# Patient Record
Sex: FEMALE | Race: WHITE | NOT HISPANIC OR LATINO | Employment: OTHER | URBAN - METROPOLITAN AREA
[De-identification: names, ages, dates, MRNs, and addresses within clinical notes are randomized per-mention and may not be internally consistent; named-entity substitution may affect disease eponyms.]

---

## 2017-01-04 ENCOUNTER — OFFICE VISIT (OUTPATIENT)
Dept: CARDIOLOGY | Facility: CLINIC | Age: 82
End: 2017-01-04

## 2017-01-04 VITALS
DIASTOLIC BLOOD PRESSURE: 80 MMHG | BODY MASS INDEX: 18.77 KG/M2 | HEART RATE: 79 BPM | SYSTOLIC BLOOD PRESSURE: 136 MMHG | HEIGHT: 62 IN | WEIGHT: 102 LBS

## 2017-01-04 DIAGNOSIS — I50.22 CHRONIC SYSTOLIC CONGESTIVE HEART FAILURE (HCC): ICD-10-CM

## 2017-01-04 DIAGNOSIS — I44.2 CHB (COMPLETE HEART BLOCK) (HCC): Primary | ICD-10-CM

## 2017-01-04 PROCEDURE — 99213 OFFICE O/P EST LOW 20 MIN: CPT | Performed by: INTERNAL MEDICINE

## 2017-01-04 PROCEDURE — 93000 ELECTROCARDIOGRAM COMPLETE: CPT | Performed by: INTERNAL MEDICINE

## 2017-01-04 NOTE — PROGRESS NOTES
Subjective:     Encounter Date:01/04/2017      Patient ID: Savannah Andres is a 81 y.o. female.    Chief Complaint: complete heart block, chronic systolic CHF    History of Present Illness     Dear Dr. Del Angel,     I had the pleasure of seeing your patient Savannah Andres in cardiac followup today.  As you well know, she is a cece 81-year-old woman who presented to the hospital with complete heart block.  She ended up getting an implantation of a permanent pacemaker.  She was readmitted for heart failure symptoms and responded well to diuresis.  She was in the hospital only one day.      Since I saw her last, she has been holding herself back a little bit.  She would like to get back to normal activities.  She denies any complaints of angina or dyspnea.          Review of Systems   All other systems reviewed and are negative.        ECG 12 Lead  Date/Time: 1/4/2017 10:58 AM  Performed by: ALHAJI VALDEZ  Authorized by: ALHAJI VALDEZ   Comparison: compared with previous ECG   Similar to previous ECG  Rhythm comments: AV sequential pacer  BPM: 79                 Objective:     Physical Exam   Constitutional: She is oriented to person, place, and time. She appears well-developed and well-nourished.   HENT:   Head: Normocephalic and atraumatic.   Neck: Normal range of motion. Neck supple.   Cardiovascular: Normal rate, regular rhythm and normal heart sounds.    Pulmonary/Chest: Effort normal and breath sounds normal.   Abdominal: Soft. Bowel sounds are normal.   Musculoskeletal: Normal range of motion.   Neurological: She is alert and oriented to person, place, and time.   Skin: Skin is warm and dry.   Psychiatric: She has a normal mood and affect. Her behavior is normal. Thought content normal.   Vitals reviewed.      Lab Review:       Assessment:          Diagnosis Plan   1. CHB (complete heart block)     2. Chronic systolic congestive heart failure            Plan:        It was a pleasure to see your patient in cardiac  followup today.  She is doing well from the cardiac standpoint without any complaints of angina or heart failure.  I have encouraged her to get back to her normal activities.  She will see me again in three months at which time I will recheck an echocardiogram to see if her LV function has improved.

## 2017-01-04 NOTE — MR AVS SNAPSHOT
Savannah Joseadrienne   2017 10:45 AM   Office Visit    Dept Phone:  [phone]   Encounter #:  [id number]    Provider:  Josef Angel MD   Department:  [facility] CARDIOLOGY                Your Full Care Plan              Your Updated Medication List          This list is accurate as of: 17 11:06 AM.  Always use your most recent med list.                furosemide 20 MG tablet   Commonly known as:  LASIX   Take 1 tablet by mouth Daily.       lisinopril 5 MG tablet   Commonly known as:  PRINIVIL,ZESTRIL   Take 1 tablet by mouth Daily.       metoprolol succinate XL 25 MG 24 hr tablet   Commonly known as:  TOPROL-XL   Take 1 tablet by mouth Daily.       potassium chloride 10 MEQ CR tablet   Commonly known as:  K-DUR,KLOR-CON   Take 1 tablet by mouth Daily.               We Performed the Following     ECG 12 Lead       You Were Diagnosed With        Codes Comments    CHB (complete heart block)    -  Primary ICD-10-CM: I44.2  ICD-9-CM: 426.0     Chronic systolic congestive heart failure     ICD-10-CM: I50.22  ICD-9-CM: 428.22, 428.0       Instructions     None    Patient Instructions History      Upcoming Appointments     Visit Type Date Time Department    HOSPITAL FOLLOW UP 2017 10:45 AM [facility] [place]    PACEMAKER ICD - IN OFFICE 2017  1:00 PM [facility] [place]    FOLLOW UP 2017 11:15 AM [facility] [place]      Tristar Signup     [facility] [employer] allows you to send messages to your doctor, view your test results, renew your prescriptions, schedule appointments, and more. To sign up, go to Briefcase and click on the Sign Up Now link in the New User? box. Enter your Tr[employer] Activation Code exactly as it appears below along with the last four digits of your Social Security Number and your Date of Birth () to complete the sign-up process. If you do not sign up before the expiration date, you must request a new code.    Tristar  "Activation Code: [email or web address]  Expires: 1/18/2017 11:06 AM    If you have questions, you can email [email or web address] or call [phone] to talk to our MyChart staff. Remember, [employer] Intelligence[employer] is NOT to be used for urgent needs. For medical emergencies, dial 911.               Other Info from Your Visit           Your Appointments     Feb 27, 2017  1:00 PM EST   PACEMAKER IN OFFICE with CAT DOTY   [facility] CARDIOLOGY (--)    [address] [address]   [phone]            Apr 05, 2017 11:15 AM EDT   Follow Up with Josef Angel MD   [facility] CARDIOLOGY (--)    [address] [address]   [phone]           Arrive 15 minutes prior to appointment.              Allergies     No Known Allergies      Reason for Visit     Congestive Heart Failure 1 MONTH BHE F/U      Vital Signs     Blood Pressure Pulse Height Weight Body Mass Index Smoking Status    136/80 79 62\" (157.5 cm) 102 lb (46.3 kg) 18.66 kg/m2 Never Smoker      Problems and Diagnoses Noted     CHB (complete heart block)    -  Primary    Heart failure          Results         "

## 2017-01-12 ENCOUNTER — TELEPHONE (OUTPATIENT)
Dept: CARDIOLOGY | Facility: CLINIC | Age: 82
End: 2017-01-12

## 2017-01-12 NOTE — TELEPHONE ENCOUNTER
Patient called today c/o UTI symptoms. I tried calling patient back but got her voicemail, so I left her a message instructing her to refer to her PCP and/or Urologist if she has one.

## 2017-01-16 ENCOUNTER — OFFICE VISIT (OUTPATIENT)
Dept: RETAIL CLINIC | Facility: CLINIC | Age: 82
End: 2017-01-16

## 2017-01-16 VITALS — OXYGEN SATURATION: 98 % | TEMPERATURE: 98.3 F | HEART RATE: 84 BPM

## 2017-01-16 DIAGNOSIS — R30.0 DYSURIA: Primary | ICD-10-CM

## 2017-01-16 DIAGNOSIS — N30.01 ACUTE CYSTITIS WITH HEMATURIA: ICD-10-CM

## 2017-01-16 LAB
BILIRUB BLD-MCNC: NEGATIVE MG/DL
CLARITY, POC: ABNORMAL
COLOR UR: YELLOW
GLUCOSE UR STRIP-MCNC: NEGATIVE MG/DL
KETONES UR QL: NEGATIVE
LEUKOCYTE EST, POC: ABNORMAL
NITRITE UR-MCNC: POSITIVE MG/ML
PH UR: 5 [PH] (ref 5–8)
PROT UR STRIP-MCNC: ABNORMAL MG/DL
RBC # UR STRIP: ABNORMAL /UL
SP GR UR: 1.02 (ref 1–1.03)
UROBILINOGEN UR QL: NORMAL

## 2017-01-16 PROCEDURE — 99213 OFFICE O/P EST LOW 20 MIN: CPT | Performed by: NURSE PRACTITIONER

## 2017-01-16 PROCEDURE — 81002 URINALYSIS NONAUTO W/O SCOPE: CPT | Performed by: NURSE PRACTITIONER

## 2017-01-16 RX ORDER — CIPROFLOXACIN 250 MG/1
250 TABLET, FILM COATED ORAL 2 TIMES DAILY
Qty: 6 TABLET | Refills: 0 | Status: SHIPPED | OUTPATIENT
Start: 2017-01-16 | End: 2017-01-19

## 2017-01-16 NOTE — PROGRESS NOTES
Subjective   Patient ID: Savannah Andres is a 81 y.o. female presents with   Chief Complaint   Patient presents with   • Difficulty Urinating     2d       HPI Comments: 80 yo wf  PMH:  CHF, HTN, hyperglycemia, Pacemaker insertion '16  Cc: dysuria and hesitancy x 48h.  She denies gross hematuria, flank pain or fever/chills. No n/v/d or abd pain      Difficulty Urinating    Associated symptoms include frequency and urgency. Pertinent negatives include no chills, flank pain, hematuria, nausea or vomiting.       No Known Allergies    The following portions of the patient's history were reviewed and updated as appropriate: allergies, current medications, past family history, past medical history, past social history, past surgical history and problem list.      Review of Systems   Constitutional: Negative for appetite change, chills, fever and unexpected weight change.   HENT: Negative.    Eyes: Negative.    Respiratory: Negative for chest tightness, shortness of breath and wheezing.    Cardiovascular: Negative for chest pain and palpitations.   Gastrointestinal: Negative for abdominal distention, abdominal pain, blood in stool, diarrhea, nausea and vomiting.   Endocrine: Negative.    Genitourinary: Positive for dysuria, frequency and urgency. Negative for difficulty urinating, enuresis, flank pain, genital sores, hematuria, menstrual problem, pelvic pain, vaginal bleeding, vaginal discharge and vaginal pain.   Musculoskeletal: Negative for arthralgias and joint swelling.   Skin: Negative for color change and rash.   Allergic/Immunologic: Negative.    Neurological: Negative for syncope, weakness and light-headedness.   Hematological: Negative for adenopathy. Does not bruise/bleed easily.   Psychiatric/Behavioral: Negative for confusion and sleep disturbance.       Objective     Vitals:    01/16/17 1120   Pulse: 84   Temp: 98.3 °F (36.8 °C)   SpO2: 98%         Physical Exam   Constitutional: She is oriented to person,  place, and time. She appears well-developed and well-nourished. No distress.   HENT:   Head: Normocephalic and atraumatic.   Eyes: Conjunctivae and EOM are normal.   Neck: Neck supple.   Cardiovascular: Normal rate, regular rhythm and normal heart sounds.    Pulmonary/Chest: Effort normal and breath sounds normal.   Abdominal: Soft. Bowel sounds are normal. She exhibits no distension and no mass. There is no tenderness. There is no rebound and no guarding.   Musculoskeletal: Normal range of motion.   Neurological: She is alert and oriented to person, place, and time. She has normal reflexes.   Skin: Skin is warm and dry. No rash noted. She is not diaphoretic.   Psychiatric: She has a normal mood and affect. Her behavior is normal. Judgment and thought content normal.   Nursing note and vitals reviewed.    Results for orders placed or performed in visit on 01/16/17   POC Urinalysis Dipstick   Result Value Ref Range    Color Yellow Yellow, Straw, Dark Yellow, Sherri    Clarity, UA Cloudy (A) Clear    Glucose, UA Negative Negative, 1000 mg/dL (3+) mg/dL    Bilirubin Negative Negative    Ketones, UA Negative Negative    Specific Gravity  1.020 1.005 - 1.030    Blood, UA Large (A) Negative    pH, Urine 5.0 5.0 - 8.0    Protein, POC 30 mg/dL (A) Negative mg/dL    Urobilinogen, UA Normal Normal    Leukocytes Large (3+) (A) Negative    Nitrite, UA Positive (A) Negative     12/2016 Creatinine .84      Savannah was seen today for difficulty urinating.    Diagnoses and all orders for this visit:    Dysuria  -     POC Urinalysis Dipstick    Acute cystitis with hematuria  -     ciprofloxacin (CIPRO) 250 MG tablet; Take 1 tablet by mouth 2 (Two) Times a Day.    Increase fluids, rest, activity as tolerated, Tylenol OTC as needed for pain  See LMD for JI in 7d. She will make appt. today    Follow-up with Primary Care Physician in 24-48 hours if these symptoms worsen or fail to improve as anticipated.

## 2017-01-19 ENCOUNTER — OFFICE VISIT (OUTPATIENT)
Dept: INTERNAL MEDICINE | Facility: CLINIC | Age: 82
End: 2017-01-19

## 2017-01-19 VITALS
HEIGHT: 62 IN | TEMPERATURE: 98 F | SYSTOLIC BLOOD PRESSURE: 148 MMHG | BODY MASS INDEX: 18.66 KG/M2 | OXYGEN SATURATION: 98 % | HEART RATE: 69 BPM | DIASTOLIC BLOOD PRESSURE: 86 MMHG | WEIGHT: 101.4 LBS

## 2017-01-19 DIAGNOSIS — N30.01 ACUTE CYSTITIS WITH HEMATURIA: Primary | ICD-10-CM

## 2017-01-19 LAB
BILIRUB BLD-MCNC: NEGATIVE MG/DL
CLARITY, POC: CLEAR
COLOR UR: YELLOW
GLUCOSE UR STRIP-MCNC: NEGATIVE MG/DL
KETONES UR QL: NEGATIVE
LEUKOCYTE EST, POC: NEGATIVE
NITRITE UR-MCNC: NEGATIVE MG/ML
PH UR: 6 [PH] (ref 5–8)
PROT UR STRIP-MCNC: NEGATIVE MG/DL
RBC # UR STRIP: NEGATIVE /UL
SP GR UR: 1.01 (ref 1–1.03)
UROBILINOGEN UR QL: NORMAL

## 2017-01-19 PROCEDURE — 81003 URINALYSIS AUTO W/O SCOPE: CPT | Performed by: FAMILY MEDICINE

## 2017-01-19 PROCEDURE — 99213 OFFICE O/P EST LOW 20 MIN: CPT | Performed by: FAMILY MEDICINE

## 2017-01-19 NOTE — MR AVS SNAPSHOT
Savannah Joseadrienne   2017 11:00 AM   Office Visit    Dept Phone:  [phone]   Encounter #:  [id number]    Provider:  Vasu Del Angel MD   Department:  [facility] MED                Your Full Care Plan              Today's Medication Changes          These changes are accurate as of: 17 11:09 AM.  If you have any questions, ask your nurse or doctor.               Stop taking medication(s)listed here:     ciprofloxacin 250 MG tablet   Commonly known as:  CIPRO   Stopped by:  Vasu Del Angel MD                      Your Updated Medication List          This list is accurate as of: 17 11:09 AM.  Always use your most recent med list.                furosemide 20 MG tablet   Commonly known as:  LASIX   Take 1 tablet by mouth Daily.       lisinopril 5 MG tablet   Commonly known as:  PRINIVIL,ZESTRIL   Take 1 tablet by mouth Daily.       metoprolol succinate XL 25 MG 24 hr tablet   Commonly known as:  TOPROL-XL   Take 1 tablet by mouth Daily.       potassium chloride 10 MEQ CR tablet   Commonly known as:  K-DUR,KLOR-CON   Take 1 tablet by mouth Daily.               Instructions     None    Patient Instructions History      Upcoming Appointments     Visit Type Date Time Department    HOSPITAL FOLLOW UP 2017 11:00 AM [facility] [place]    PACEMAKER ICD - IN OFFICE 2017  1:00 PM [facility] [place]    FOLLOW UP 2017 11:15 AM [facility] [place]      DataRose Signup     [facility] Data[employer] allows you to send messages to your doctor, view your test results, renew your prescriptions, schedule appointments, and more. To sign up, go to Bswift and click on the Sign Up Now link in the New User? box. Enter your Data[employer] Activation Code exactly as it appears below along with the last four digits of your Social Security Number and your Date of Birth () to complete the sign-up process. If you do not sign up before the expiration  "date, you must request a new code.    [email or web address] Activation Code: [email or web address]  Expires: 2/2/2017 11:09 AM    If you have questions, you can email [email or web address] or call [phone] to talk to our [employer] staff. Remember, MyChart is NOT to be used for urgent needs. For medical emergencies, dial 911.               Other Info from Your Visit           Your Appointments     Feb 27, 2017  1:00 PM EST   PACEMAKER IN OFFICE with CAT DOTY   [facility] CARDIOLOGY (--)    [address] [facility] [address]  [facility] [id number] [phone]            Apr 05, 2017 11:15 AM EDT   Follow Up with Josef Angel MD   [facility] CARDIOLOGY (--)    [address]  [facility] [address] [phone]           Arrive 15 minutes prior to appointment.              Allergies     No Known Allergies      Reason for Visit     following up on UTI went to urgent care building in [facility] Monday 01/16/17      Vital Signs     Blood Pressure Pulse Temperature Height Weight Oxygen Saturation    148/86 (BP Location: Left arm, Patient Position: Sitting, Cuff Size: Adult) 69 98 °F (36.7 °C) (Oral) 62\" (157.5 cm) 101 lb 6.4 oz (46 kg) 98%    Breastfeeding? Body Mass Index Smoking Status             No 18.55 kg/m2 Never Smoker           "

## 2017-01-19 NOTE — PROGRESS NOTES
Subjective   Savannah Andres is a 81 y.o. female.     Chief Complaint   Patient presents with   • following up on UTI     went to urgent care [facility] in [facility] Monday 01/16/17         History of Present Illness patient went to urgent care 3 days ago was given ciprofloxacin 250 mg twice a day for urinary tract infection today her symptomsare none she's feeling much better she doesn't know why she developed these urinary tract infection she hasn't had them in the past significant changes her lifestyle as last year she's been living in a senior homeand will develop intermittent bouts of diarrhea    The following portions of the patient's history were reviewed and updated as appropriate: allergies, current medications, past medical history, past surgical history and problem list.    Review of Systems   Constitutional: Negative for appetite change, chills, fever and unexpected weight change.   HENT: Negative.    Eyes: Negative.    Respiratory: Negative for chest tightness, shortness of breath and wheezing.    Cardiovascular: Negative for chest pain and palpitations.   Gastrointestinal: Negative for abdominal distention, abdominal pain and vomiting.   Endocrine: Negative.    Genitourinary: Negative for genital sores, hematuria, menstrual problem, pelvic pain, vaginal bleeding, vaginal discharge and vaginal pain.   Musculoskeletal: Negative for joint swelling.   Skin: Negative for color change and rash.   Allergic/Immunologic: Negative.    Neurological: Negative for seizures and syncope.   Hematological: Negative for adenopathy. Does not bruise/bleed easily.   Psychiatric/Behavioral: Negative for confusion and decreased concentration.       Objective   Physical Exam   Constitutional: She is oriented to person, place, and time. She appears well-developed and well-nourished.   Pulmonary/Chest: Effort normal and breath sounds normal.   Abdominal: Soft. Bowel sounds are normal. There is no tenderness.   Neurological: She is  alert and oriented to person, place, and time.   Psychiatric: She has a normal mood and affect. Her behavior is normal. Judgment and thought content normal.   Vitals reviewed.      Assessment/Plan   Savannah was seen today for following up on uti.    Diagnoses and all orders for this visit:    Acute cystitis with hematuria      UA reveals resolved UTI  Follow-up as needed recommend wipes hygiene for episodes of diarrhea

## 2017-02-13 ENCOUNTER — TELEPHONE (OUTPATIENT)
Dept: CARDIOLOGY | Facility: CLINIC | Age: 82
End: 2017-02-13

## 2017-02-13 NOTE — TELEPHONE ENCOUNTER
02/13/17  2:33 PM  Savannha Andres  1935    Home Phone [phone]     Ms. Andres calls to report watery diarrhea for the last 1-2 weeks. She states that she lives in a home for seniors and there has been a bug going around. She has an appt with PCP tomorrow for this issue. However, she did some research and sees that metoprolol and KCl can also cause diarrhea. She wanted to know if these medicines could be making it worse. She has been taking these medications since 11/29/16. She reports no watery diarrhea until now.     I advised to keep appt with PCP for treatment of possible GI bug. I advised that if the PCP felt the metoprolol or KCl needed to be stopped, they could contact our office.     Mary Beth VELASQUEZ RN

## 2017-02-14 ENCOUNTER — OFFICE VISIT (OUTPATIENT)
Dept: INTERNAL MEDICINE | Facility: CLINIC | Age: 82
End: 2017-02-14

## 2017-02-14 VITALS
OXYGEN SATURATION: 100 % | SYSTOLIC BLOOD PRESSURE: 160 MMHG | BODY MASS INDEX: 18.2 KG/M2 | HEIGHT: 62 IN | TEMPERATURE: 97.8 F | HEART RATE: 78 BPM | WEIGHT: 98.9 LBS | DIASTOLIC BLOOD PRESSURE: 84 MMHG

## 2017-02-14 DIAGNOSIS — A08.4 VIRAL ENTERITIS: ICD-10-CM

## 2017-02-14 DIAGNOSIS — E87.6 HYPOKALEMIA: ICD-10-CM

## 2017-02-14 PROBLEM — R30.0 DYSURIA: Status: RESOLVED | Noted: 2017-01-16 | Resolved: 2017-02-14

## 2017-02-14 LAB
BUN SERPL-MCNC: 13 MG/DL (ref 8–23)
BUN/CREAT SERPL: 14.4 (ref 7–25)
CALCIUM SERPL-MCNC: 10 MG/DL (ref 8.6–10.5)
CHLORIDE SERPL-SCNC: 104 MMOL/L (ref 98–107)
CO2 SERPL-SCNC: 27.2 MMOL/L (ref 22–29)
CREAT SERPL-MCNC: 0.9 MG/DL (ref 0.57–1)
GLUCOSE SERPL-MCNC: 114 MG/DL (ref 65–99)
POTASSIUM SERPL-SCNC: 5.3 MMOL/L (ref 3.5–5.2)
SODIUM SERPL-SCNC: 144 MMOL/L (ref 136–145)

## 2017-02-14 PROCEDURE — 99213 OFFICE O/P EST LOW 20 MIN: CPT | Performed by: FAMILY MEDICINE

## 2017-02-14 NOTE — PROGRESS NOTES
Subjective   Savannah Andres is a 81 y.o. female.     Chief Complaint   Patient presents with   • patient has had diarrhea since last Tuesday     patient lives at the [facility] and other residents have this also   • patient is concerned that thismay be due to medications (diu         History of Present Illness patient has had one week history of multiple stiffness in the morning then no more stool throughout the day as found black or bloody any specific abdominal pain she is not having any vomiting there is no fever take any medication but seems to gradually be improving  Medications long-standing with no specific changes  The following portions of the patient's history were reviewed and updated as appropriate: allergies, current medications, past family history, past medical history, past social history, past surgical history and problem list.    Review of Systems   Constitutional: Negative for appetite change, chills, fever and unexpected weight change.   HENT: Negative.    Eyes: Negative.    Respiratory: Negative for chest tightness, shortness of breath and wheezing.    Cardiovascular: Negative for chest pain and palpitations.   Gastrointestinal: Positive for diarrhea. Negative for abdominal distention, abdominal pain, blood in stool, nausea, rectal pain and vomiting.   Endocrine: Negative.    Genitourinary: Negative for genital sores, hematuria, menstrual problem, pelvic pain, vaginal bleeding, vaginal discharge and vaginal pain.   Musculoskeletal: Negative for joint swelling.   Skin: Negative for color change and rash.   Allergic/Immunologic: Negative.    Hematological: Negative for adenopathy. Does not bruise/bleed easily.   Psychiatric/Behavioral: Negative for confusion and decreased concentration.       Objective   Physical Exam   Constitutional: She is oriented to person, place, and time. She appears well-developed and well-nourished.   HENT:   Head: Normocephalic and atraumatic.   Eyes: EOM are normal. Pupils  are equal, round, and reactive to light.   Neck: Normal range of motion. Neck supple.   Cardiovascular: Normal rate and regular rhythm.    Pulmonary/Chest: Effort normal and breath sounds normal.   Abdominal: Soft. Bowel sounds are normal. There is no tenderness.   Neurological: She is alert and oriented to person, place, and time.   Psychiatric: She has a normal mood and affect. Her behavior is normal. Judgment and thought content normal.   Vitals reviewed.      Assessment/Plan   Savannah was seen today for patient has had diarrhea since last tuesday and patient is concerned that thismay be due to medications (diu.    Diagnoses and all orders for this visit:    Hypokalemia  -     Basic Metabolic Panel    Viral enteritis  -     Basic Metabolic Panel      Follow-up results of blood work symptomatic treatment with fluids

## 2017-02-15 ENCOUNTER — TELEPHONE (OUTPATIENT)
Dept: INTERNAL MEDICINE | Facility: CLINIC | Age: 82
End: 2017-02-15

## 2017-02-15 NOTE — TELEPHONE ENCOUNTER
----- Message from Vasu Del Angel MD sent at 2/15/2017  1:13 PM EST -----  Recommend stop potassium take the furosemide every other day lfollow up in one month

## 2017-02-15 NOTE — TELEPHONE ENCOUNTER
Unable to reach patient - telephone number disconnected.  LMA for daughter Savannah to call.  Spoke to son, Collin, and he was notified.

## 2017-02-27 ENCOUNTER — CLINICAL SUPPORT NO REQUIREMENTS (OUTPATIENT)
Dept: CARDIOLOGY | Facility: CLINIC | Age: 82
End: 2017-02-27

## 2017-02-27 DIAGNOSIS — I44.2 ATRIOVENTRICULAR BLOCK, COMPLETE (HCC): Primary | ICD-10-CM

## 2017-02-27 PROCEDURE — 93280 PM DEVICE PROGR EVAL DUAL: CPT | Performed by: INTERNAL MEDICINE

## 2017-03-03 ENCOUNTER — TELEPHONE (OUTPATIENT)
Dept: INTERNAL MEDICINE | Facility: CLINIC | Age: 82
End: 2017-03-03

## 2017-03-03 NOTE — TELEPHONE ENCOUNTER
Patient called stating that she takes the furosemide every other day.  She has been getting up during the night more to urinate and is extremely thirsty all the time.  She thinks that this may be from the diuretic.  (Her blood sugar has also been elevated) Please advise.

## 2017-03-09 ENCOUNTER — OFFICE VISIT (OUTPATIENT)
Dept: INTERNAL MEDICINE | Facility: CLINIC | Age: 82
End: 2017-03-09

## 2017-03-09 VITALS
HEIGHT: 62 IN | HEART RATE: 80 BPM | OXYGEN SATURATION: 98 % | DIASTOLIC BLOOD PRESSURE: 74 MMHG | TEMPERATURE: 97.5 F | SYSTOLIC BLOOD PRESSURE: 156 MMHG | BODY MASS INDEX: 18.05 KG/M2 | WEIGHT: 98.1 LBS

## 2017-03-09 DIAGNOSIS — I50.22 CHF (CONGESTIVE HEART FAILURE), NYHA CLASS III, CHRONIC, SYSTOLIC (HCC): Primary | ICD-10-CM

## 2017-03-09 PROCEDURE — 99213 OFFICE O/P EST LOW 20 MIN: CPT | Performed by: FAMILY MEDICINE

## 2017-03-09 RX ORDER — METOPROLOL SUCCINATE 25 MG/1
50 TABLET, EXTENDED RELEASE ORAL
Qty: 30 TABLET | Refills: 6 | Status: SHIPPED | OUTPATIENT
Start: 2017-03-09 | End: 2017-03-09 | Stop reason: SDUPTHER

## 2017-03-09 RX ORDER — METOPROLOL SUCCINATE 25 MG/1
50 TABLET, EXTENDED RELEASE ORAL
Qty: 60 TABLET | Refills: 6 | Status: SHIPPED | OUTPATIENT
Start: 2017-03-09 | End: 2017-03-09 | Stop reason: SDUPTHER

## 2017-03-09 RX ORDER — FUROSEMIDE 20 MG/1
TABLET ORAL
Qty: 30 TABLET | Refills: 6 | Status: SHIPPED | OUTPATIENT
Start: 2017-03-09 | End: 2017-03-23 | Stop reason: SDUPTHER

## 2017-03-09 RX ORDER — METOPROLOL SUCCINATE 50 MG/1
50 TABLET, EXTENDED RELEASE ORAL
Qty: 30 TABLET | Refills: 6 | Status: SHIPPED | OUTPATIENT
Start: 2017-03-09 | End: 2017-03-23

## 2017-03-09 NOTE — PROGRESS NOTES
Subjective   Savannah Andres is a 81 y.o. female.     Chief Complaint   Patient presents with   • patient has been urinating all the time   • patient has been thirsty all the time         History of Present Illness   Patient's blood pressure been fairly stable as well as her weight she is taking the Lasix every other day and this has helped her symptoms a little bit she has no elevated blood pressure readings and no weight gain  The following portions of the patient's history were reviewed and updated as appropriate: allergies, current medications, past family history, past medical history, past social history, past surgical history and problem list.    Review of Systems   Constitutional: Negative for appetite change, chills, fever and unexpected weight change.   HENT: Negative.    Eyes: Negative.    Respiratory: Negative for chest tightness, shortness of breath and wheezing.    Cardiovascular: Negative for chest pain and palpitations.   Gastrointestinal: Negative for abdominal distention, abdominal pain and vomiting.   Endocrine: Negative.    Genitourinary: Positive for difficulty urinating and frequency. Negative for dysuria, genital sores, hematuria, menstrual problem, pelvic pain, urgency, vaginal bleeding, vaginal discharge and vaginal pain.   Musculoskeletal: Negative for joint swelling.   Skin: Negative for color change and rash.   Allergic/Immunologic: Negative.    Neurological: Negative for seizures and syncope.   Hematological: Negative for adenopathy. Does not bruise/bleed easily.   Psychiatric/Behavioral: Negative for confusion and decreased concentration.       Objective   Physical Exam   Constitutional: She is oriented to person, place, and time. She appears well-developed and well-nourished.   HENT:   Head: Normocephalic and atraumatic.   Eyes: EOM are normal. Pupils are equal, round, and reactive to light.   Neck: Normal range of motion. Neck supple.   Cardiovascular: Normal rate and regular rhythm.     Pulmonary/Chest: Effort normal and breath sounds normal.   Abdominal: Soft. Bowel sounds are normal. There is no tenderness.   Musculoskeletal: She exhibits no edema.   Neurological: She is alert and oriented to person, place, and time.   Psychiatric: She has a normal mood and affect. Her behavior is normal. Judgment and thought content normal.   Vitals reviewed.      Assessment/Plan   Savannah was seen today for patient has been urinating all the time and patient has been thirsty all the time.    Diagnoses and all orders for this visit:    CHF (congestive heart failure), NYHA class III, chronic, systolic    Other orders  -     Discontinue: metoprolol succinate XL (TOPROL-XL) 25 MG 24 hr tablet; Take 2 tablets by mouth Daily.  -     furosemide (LASIX) 20 MG tablet; every third day  -     metoprolol succinate XL (TOPROL-XL) 50 MG 24 hr tablet; Take 1 tablet by mouth Daily.      We'll follow-up in one month

## 2017-03-22 ENCOUNTER — TELEPHONE (OUTPATIENT)
Dept: INTERNAL MEDICINE | Facility: CLINIC | Age: 82
End: 2017-03-22

## 2017-03-22 NOTE — TELEPHONE ENCOUNTER
Patient called stating that she was notified to take furosemide every 3 days at her last appointment.  She has been having more swelling in her legs and patient would like to know if she can increase this to 1 every other day.  She is not having any shortness of breath or any other symptoms. Patient also concerned why her metoprolol was increased to 50 mg  - she said that she thought that she was taking 25 mg 1 daily. She is to follow up with her cardiologist on 4/5/17.  Please advise.

## 2017-03-23 RX ORDER — FUROSEMIDE 20 MG/1
20 TABLET ORAL EVERY OTHER DAY
Qty: 30 TABLET | Refills: 6
Start: 2017-03-23 | End: 2017-10-12 | Stop reason: SDUPTHER

## 2017-03-23 RX ORDER — METOPROLOL SUCCINATE 50 MG/1
TABLET, EXTENDED RELEASE ORAL
Qty: 30 TABLET | Refills: 6
Start: 2017-03-23 | End: 2017-11-17 | Stop reason: SDUPTHER

## 2017-03-23 NOTE — TELEPHONE ENCOUNTER
I thought that she reported taking metoprolol 25 mg 2 daily. So I changed it to one pill daily of 50 mg, if she was taking 25 mg 1 daily then  reduce the metoprolol to 25 mg 1 daily. take the furosemide every other day

## 2017-03-23 NOTE — TELEPHONE ENCOUNTER
Patient notified - she was taking metoprolol XL 25 mg daily not 2 daily as reported at her last visit.  This was changed back to 25 mg 1 daily per Dr. Del Angel.

## 2017-04-05 ENCOUNTER — OFFICE VISIT (OUTPATIENT)
Dept: CARDIOLOGY | Facility: CLINIC | Age: 82
End: 2017-04-05

## 2017-04-05 VITALS
WEIGHT: 98 LBS | HEART RATE: 62 BPM | DIASTOLIC BLOOD PRESSURE: 90 MMHG | SYSTOLIC BLOOD PRESSURE: 142 MMHG | BODY MASS INDEX: 18.03 KG/M2 | HEIGHT: 62 IN

## 2017-04-05 DIAGNOSIS — I44.2 CHB (COMPLETE HEART BLOCK) (HCC): Primary | ICD-10-CM

## 2017-04-05 PROCEDURE — 99213 OFFICE O/P EST LOW 20 MIN: CPT | Performed by: INTERNAL MEDICINE

## 2017-04-05 PROCEDURE — 93000 ELECTROCARDIOGRAM COMPLETE: CPT | Performed by: INTERNAL MEDICINE

## 2017-04-17 NOTE — PROGRESS NOTES
Subjective:     Encounter Date:04/05/2017      Patient ID: Savannah Andres is a 81 y.o. female.    Chief Complaint: CHB    History of Present Illness     Dear Dr. Del Angel:      I had the pleasure of seeing the patient in cardiac follow-up today.  As you well know, she is a cece, 81-year-old woman who presented to the hospital with complete heart block.  She had a permanent pacemaker placed.  She had some congestive heart failure immediately afterwards that responded to diuresis.  Her left ventricular function was checked after recovery and is normal.      Since I have last seen her, she reports doing quite well.  She has no complaints of angina, dyspnea, or syncope.          Review of Systems   Cardiovascular: Negative for orthopnea.   Respiratory: Negative for shortness of breath.    All other systems reviewed and are negative.        ECG 12 Lead  Date/Time: 4/16/2017 11:48 PM  Performed by: ALHAJI VALDEZ  Authorized by: ALHAJI VALDEZ   Comparison: compared with previous ECG   Similar to previous ECG  Rhythm: sinus rhythm  Rhythm comments: A-sensed, V-paced  BPM: 62                 Objective:     Physical Exam   Constitutional: She is oriented to person, place, and time. She appears well-developed and well-nourished.   HENT:   Head: Normocephalic and atraumatic.   Neck: Normal range of motion. Neck supple. No JVD present.   Cardiovascular: Normal rate, regular rhythm and normal heart sounds.  Exam reveals no S3 and no S4.    Pulmonary/Chest: Effort normal and breath sounds normal. She has no rales.   Abdominal: Soft. Bowel sounds are normal. She exhibits no ascites. There is no hepatomegaly.   Musculoskeletal: Normal range of motion.   Neurological: She is alert and oriented to person, place, and time.   Skin: Skin is warm and dry.   Psychiatric: She has a normal mood and affect. Her behavior is normal. Thought content normal.   Vitals reviewed.      Lab Review:       Assessment:          Diagnosis Plan   1. CHB  (complete heart block)            Plan:        It was a pleasure to see the patient in cardiac follow-up today.  She is doing very well from the cardiac standpoint.  She has no more congestive heart failure symptoms.  I suspect her left ventricular function has returned to normal.  She will get an echocardiogram at her convenience to document this.  She will see me again in six months or sooner if symptoms warrant.      Heart Failure  Assessment  • NYHA class I - There is no limitation of physical activity. Physical activity does not cause fatigue, palpitations or shortness of breath.  • The patient was newly diagnosed with heart failure within the past 12 months  • ACE inhibitor prescribed  • Beta blocker prescribed  • Diuretics prescribed  • The most recent ejection fraction is 38%  • Left ventricular function is moderately reduced by qualitative assessment    Plan  • The patient has received heart failure education on the following topics: prognosis/end-of-life issues, dietary sodium restriction, medication instructions, smoking cessation, minimizing or avoiding NSAID use, symptom management, physical activity, weight monitoring, minimizing alcohol intake and referral for visiting nurse, heart failure education program, or management program  • The heart failure care plan was discussed with the patient today including: continuing the current program    Subjective/Objective    • Physical exam findings negative for rales, peripheral edema, elevated JVP, S3 gallop, S4 gallop, hepatomegaly and ascites.

## 2017-06-12 RX ORDER — FUROSEMIDE 20 MG/1
TABLET ORAL
Qty: 30 TABLET | Refills: 5 | Status: SHIPPED | OUTPATIENT
Start: 2017-06-12 | End: 2018-01-13 | Stop reason: SDUPTHER

## 2017-06-12 RX ORDER — LISINOPRIL 5 MG/1
TABLET ORAL
Qty: 30 TABLET | Refills: 5 | Status: SHIPPED | OUTPATIENT
Start: 2017-06-12 | End: 2017-12-10 | Stop reason: SDUPTHER

## 2017-06-29 ENCOUNTER — CLINICAL SUPPORT NO REQUIREMENTS (OUTPATIENT)
Dept: CARDIOLOGY | Facility: CLINIC | Age: 82
End: 2017-06-29

## 2017-06-29 DIAGNOSIS — I44.2 CHB (COMPLETE HEART BLOCK) (HCC): Primary | ICD-10-CM

## 2017-06-29 PROCEDURE — 93296 REM INTERROG EVL PM/IDS: CPT | Performed by: INTERNAL MEDICINE

## 2017-06-29 PROCEDURE — 93294 REM INTERROG EVL PM/LDLS PM: CPT | Performed by: INTERNAL MEDICINE

## 2017-10-12 ENCOUNTER — CLINICAL SUPPORT NO REQUIREMENTS (OUTPATIENT)
Dept: CARDIOLOGY | Facility: CLINIC | Age: 82
End: 2017-10-12

## 2017-10-12 ENCOUNTER — OFFICE VISIT (OUTPATIENT)
Dept: CARDIOLOGY | Facility: CLINIC | Age: 82
End: 2017-10-12

## 2017-10-12 VITALS
DIASTOLIC BLOOD PRESSURE: 68 MMHG | SYSTOLIC BLOOD PRESSURE: 130 MMHG | BODY MASS INDEX: 18.77 KG/M2 | HEART RATE: 62 BPM | WEIGHT: 102 LBS | HEIGHT: 62 IN

## 2017-10-12 DIAGNOSIS — I44.2 CHB (COMPLETE HEART BLOCK) (HCC): Primary | ICD-10-CM

## 2017-10-12 DIAGNOSIS — I44.2 THIRD DEGREE HEART BLOCK (HCC): Primary | ICD-10-CM

## 2017-10-12 PROCEDURE — 99213 OFFICE O/P EST LOW 20 MIN: CPT | Performed by: INTERNAL MEDICINE

## 2017-10-12 PROCEDURE — 93280 PM DEVICE PROGR EVAL DUAL: CPT | Performed by: INTERNAL MEDICINE

## 2017-10-22 PROCEDURE — 93000 ELECTROCARDIOGRAM COMPLETE: CPT | Performed by: INTERNAL MEDICINE

## 2017-10-22 NOTE — PROGRESS NOTES
Subjective:     Encounter Date:10/12/2017      Patient ID: Savannah Andres is a 82 y.o. female.    Chief Complaint: complete heart block    History of Present Illness    Dear Dr. Del Angel:    I had the pleasure of seeing this patient in cardiac follow up today.  As you well know, she is a cece 82-year-old woman with history of hypertension and complete heart block.  She is status post implantation of a dual chamber pacemaker.      She comes in for her 6 month follow up.  Since I have last seen her, she reports doing well.  Her LV function is normal.  She has no complaints of angina or heart failure.    Her pacemaker was checked today and is functioning well.        Review of Systems   All other systems reviewed and are negative.        ECG 12 Lead  Date/Time: 10/22/2017 12:03 PM  Performed by: ALHAJI VALDEZ  Authorized by: ALHAJI VALDEZ   Comparison: compared with previous ECG   Similar to previous ECG  Rhythm comments: A-V sequential pacing  BPM: 62                 Objective:     Physical Exam   Constitutional: She is oriented to person, place, and time. She appears well-developed and well-nourished.   HENT:   Head: Normocephalic and atraumatic.   Neck: Normal range of motion. Neck supple.   Cardiovascular: Normal rate, regular rhythm and normal heart sounds.    Pulmonary/Chest: Effort normal and breath sounds normal.   Abdominal: Soft. Bowel sounds are normal.   Musculoskeletal: Normal range of motion.   Neurological: She is alert and oriented to person, place, and time.   Skin: Skin is warm and dry.   Psychiatric: She has a normal mood and affect. Her behavior is normal. Thought content normal.   Vitals reviewed.      Lab Review:       Assessment:          Diagnosis Plan   1. CHB (complete heart block)            Plan:       It was a pleasure to see your patient in cardiac follow up today.  She is doing very well from a cardiac standpoint without any complaints of angina or heart failure.  I have asked her to watch her  blood pressure closely.  She has no new findings.  She will see me again in 6 months or sooner if symptoms warrant.

## 2017-11-13 RX ORDER — METOPROLOL SUCCINATE 50 MG/1
TABLET, EXTENDED RELEASE ORAL
Qty: 30 TABLET | Refills: 5 | OUTPATIENT
Start: 2017-11-13

## 2017-11-14 RX ORDER — METOPROLOL SUCCINATE 50 MG/1
25 TABLET, EXTENDED RELEASE ORAL DAILY
Qty: 15 TABLET | Refills: 0 | Status: SHIPPED | OUTPATIENT
Start: 2017-11-14 | End: 2018-05-14 | Stop reason: DRUGHIGH

## 2017-11-16 RX ORDER — METOPROLOL SUCCINATE 50 MG/1
TABLET, EXTENDED RELEASE ORAL
Qty: 15 TABLET | Refills: 0 | OUTPATIENT
Start: 2017-11-16

## 2017-11-17 RX ORDER — METOPROLOL SUCCINATE 50 MG/1
TABLET, EXTENDED RELEASE ORAL
Qty: 30 TABLET | Refills: 3 | Status: SHIPPED | OUTPATIENT
Start: 2017-11-17 | End: 2018-01-22 | Stop reason: SDUPTHER

## 2017-12-12 RX ORDER — LISINOPRIL 5 MG/1
TABLET ORAL
Qty: 30 TABLET | Refills: 11 | Status: SHIPPED | OUTPATIENT
Start: 2017-12-12 | End: 2018-01-22 | Stop reason: SDUPTHER

## 2018-01-08 ENCOUNTER — TELEPHONE (OUTPATIENT)
Dept: CARDIOLOGY | Facility: CLINIC | Age: 83
End: 2018-01-08

## 2018-01-08 NOTE — TELEPHONE ENCOUNTER
Patient called to see if it's ok for her to take aspirin for back pain.    Per verbal from Dr. Angel, this is ok.    Left voicemail informing patient.

## 2018-01-09 ENCOUNTER — HOSPITAL ENCOUNTER (EMERGENCY)
Facility: HOSPITAL | Age: 83
Discharge: HOME | End: 2018-01-09
Attending: EMERGENCY MEDICINE | Admitting: EMERGENCY MEDICINE

## 2018-01-09 ENCOUNTER — APPOINTMENT (OUTPATIENT)
Dept: GENERAL RADIOLOGY | Facility: HOSPITAL | Age: 83
End: 2018-01-09

## 2018-01-09 VITALS
DIASTOLIC BLOOD PRESSURE: 97 MMHG | HEIGHT: 62 IN | BODY MASS INDEX: 18.77 KG/M2 | HEART RATE: 64 BPM | SYSTOLIC BLOOD PRESSURE: 188 MMHG | WEIGHT: 102 LBS | TEMPERATURE: 96.3 F | RESPIRATION RATE: 16 BRPM | OXYGEN SATURATION: 98 %

## 2018-01-09 DIAGNOSIS — M54.50 ACUTE RIGHT-SIDED LOW BACK PAIN WITHOUT SCIATICA: Primary | ICD-10-CM

## 2018-01-09 PROCEDURE — 99282 EMERGENCY DEPT VISIT SF MDM: CPT

## 2018-01-09 PROCEDURE — 72110 X-RAY EXAM L-2 SPINE 4/>VWS: CPT

## 2018-01-09 RX ORDER — CYCLOBENZAPRINE HCL 5 MG
5 TABLET ORAL 3 TIMES DAILY PRN
Qty: 15 TABLET | Refills: 0 | Status: SHIPPED | OUTPATIENT
Start: 2018-01-09 | End: 2018-05-14 | Stop reason: ALTCHOICE

## 2018-01-09 NOTE — ED PROVIDER NOTES
" EMERGENCY DEPARTMENT ENCOUNTER    CHIEF COMPLAINT  Chief Complaint: Back Pain  History given by: Patient  History limited by: N/A  Room Number: 48/48  PMD: Vasu Del Angel MD      HPI:  Pt presents to the ED c/o intermittent episodes of \"stiff\" low back pain (right > left) onset about a week ago. No radiation to the BLEs. No known injury sustained to the low back recently. Pt reports that her back pain worsens with movement and improves with remaining still. Pt reports that she has taken ASA without significant sx relief. Pt denies chest pain, dyspnea, abdominal pain, flank pain, bowel/bladder incontinence, motor/sensory deficits, saddle anesthesia, and urinary retention. There are no other complaints at this time.     Pain Location: Low back (right > left)  Radiation: None  Quality: \"stiffness\"  Intensity/Severity: Moderate  Duration: Onset about a week ago  Onset quality: Gradual  Timing: Intermittent  Progression: Waxing and waning  Aggravating Factors: Movement  Alleviating Factors: Remaining still   Previous Episodes: None  Treatment before arrival: ASA (no significant sx relief)  Associated Symptoms: None      PAST MEDICAL HISTORY  Active Ambulatory Problems     Diagnosis Date Noted   • Third degree heart block 11/22/2016   • CHF (congestive heart failure), NYHA class III 11/30/2016   • Hyperglycemia 12/12/2016   • Hypokalemia 12/12/2016   • Viral enteritis 02/14/2017     Resolved Ambulatory Problems     Diagnosis Date Noted   • Dysuria 01/16/2017   • Acute cystitis with hematuria 01/16/2017     Past Medical History:   Diagnosis Date   • Bilateral leg edema    • CHB (complete heart block)    • CHF (congestive heart failure)    • Loss of consciousness    • SOB (shortness of breath)    • Symptomatic bradycardia    • Syncope          PAST SURGICAL HISTORY  Past Surgical History:   Procedure Laterality Date   • CARDIAC ELECTROPHYSIOLOGY PROCEDURE N/A 11/23/2016    Procedure: Pacemaker DC new  [employer];  " Surgeon: Ananth Malcolm MD;  Location: [facility] INVASIVE LOCATION;  Service:    • PACEMAKER IMPLANTATION           FAMILY HISTORY  Family History   Problem Relation Age of Onset   • Hypertension Mother    • Hypertension Father          SOCIAL HISTORY  Social History     Social History   • Marital status: Single     Spouse name: N/A   • Number of children: 3   • Years of education: N/A     Occupational History   • unknown      Social History Main Topics   • Smoking status: Never Smoker   • Smokeless tobacco: Never Used   • Alcohol use No   • Drug use: No   • Sexual activity: Defer     Other Topics Concern   • Not on file     Social History Narrative         ALLERGIES  Review of patient's allergies indicates no known allergies.        REVIEW OF SYSTEMS  Review of Systems   Constitutional: Negative.    Eyes: Negative for visual disturbance.   Respiratory: Negative for shortness of breath.    Cardiovascular: Negative for chest pain.   Gastrointestinal: Negative for abdominal pain, nausea and vomiting.   Genitourinary: Negative for difficulty urinating and flank pain.   Musculoskeletal: Positive for back pain. Negative for neck pain.   Skin: Negative.    Neurological: Negative for syncope, weakness, numbness and headaches.   Psychiatric/Behavioral: Negative.    All other systems reviewed and are negative.           PHYSICAL EXAM  ED Triage Vitals   Temp Heart Rate Resp BP SpO2   01/09/18 1147 01/09/18 0936 01/09/18 0936 01/09/18 1145 01/09/18 0936   96.3 °F (35.7 °C) 87 16 188/97 98 %      Temp src Heart Rate Source Patient Position BP Location FiO2 (%)   01/09/18 1147 01/09/18 0936 -- -- --   Oral Monitor          Physical Exam   Constitutional: She is oriented to person, place, and time. No distress.   Eyes: EOM are normal.   Neck: Normal range of motion.   Cardiovascular: Normal rate and regular rhythm.    Pulmonary/Chest: Effort normal and breath sounds normal. No respiratory distress.   Musculoskeletal: She  exhibits tenderness (to the right low back - mild).   Pt ambulates in the ER without difficulty.    Neurological: She is alert and oriented to person, place, and time. She has normal sensation, normal strength and normal reflexes.   Skin: Skin is warm and dry.   Psychiatric: Affect normal.   Nursing note and vitals reviewed.            RADIOLOGY  XR Spine Lumbar 4+ View     There are no prior exams. There is prominent diffuse  osteoporosis with some minimal compression deformity involving the  superior endplate of L3. There is also some minimal compression  deformity of the T12 vertebral body. There is moderate spurring of the  posterior facets throughout the lumbar spine.    Interpreted by radiologist. Independently viewed by me.                Ordered the above noted radiological studies. Reviewed by me in PACS.       PROCEDURES  Procedures              PROGRESS AND CONSULTS  ED Course   Comment By Time   2:18 PM  Patient with musculoskeletal back pain.  May have very mild compression fracture of lumbar spine however no trauma and unclear age.  Patient ambulates without difficulty.  Will discharge home.  Follow up with PMD.  Tylenol.  Flexeril. Mehul Cameron MD 01/09 2150     2:16 PM:  Informed pt that her L-Spine X-Ray shows diffuse chronic changes with minimal compression deformities present. Pt was advised to apply heat to the area of pain and to f/u with PMD. Pt will be prescribed with rx for muscle relaxer. RTER warnings given. Pt agrees with plan for discharge.             MEDICAL DECISION MAKING      MDM  Number of Diagnoses or Management Options     Amount and/or Complexity of Data Reviewed  Tests in the radiology section of CPT®: ordered and reviewed (L-Spine X-Ray:   There are no prior exams. There is prominent diffuse  osteoporosis with some minimal compression deformity involving the superior endplate of L3. There is also some minimal compression deformity of the T12 vertebral body. There is  moderate spurring of the posterior facets throughout the lumbar spine.)    Patient Progress  Patient progress: stable             DIAGNOSIS  Final diagnoses:   Acute right-sided low back pain without sciatica         DISPOSITION  Pt discharged.    DISCHARGE    Patient discharged in stable condition.    Reviewed implications of results, diagnosis, meds, responsibility to follow up, warning signs and symptoms of possible worsening, potential complications and reasons to return to ER.    Patient/Family voiced understanding of above instructions.    Discussed plan for discharge, as there is no emergent indication for admission.  Pt/family is agreeable and understands need for follow up and repeat testing.  Pt is aware that discharge does not mean that nothing is wrong but it indicates no emergency is present that requires admission and they must continue care with follow-up as given below or physician of their choice.     FOLLOW-UP  Vasu Del Angel MD  [id number] [id number] [phone]    Schedule an appointment as soon as possible for a visit in 2 days           Medication List      New Prescriptions          cyclobenzaprine 5 MG tablet   Commonly known as:  FLEXERIL   Take 1 tablet by mouth 3 (Three) Times a Day As Needed for Muscle Spasms.                       Latest Documented Vital Signs:  As of 2:21 PM  BP- 146/81 HR- 64 Temp- 96.3 °F (35.7 °C) (Oral) O2 sat- 98%        --  Documentation assistance provided by bao Melchor for Dr. Nisha MD.  Information recorded by the scribe was done at my direction and has been verified and validated by me.         Shamar Melchor  01/09/18 1196       Mehul Cameron MD  01/09/18 9002

## 2018-01-11 ENCOUNTER — CLINICAL SUPPORT NO REQUIREMENTS (OUTPATIENT)
Dept: CARDIOLOGY | Facility: CLINIC | Age: 83
End: 2018-01-11

## 2018-01-11 DIAGNOSIS — I44.2 THIRD DEGREE AV BLOCK (HCC): Primary | ICD-10-CM

## 2018-01-11 PROCEDURE — 93296 REM INTERROG EVL PM/IDS: CPT | Performed by: INTERNAL MEDICINE

## 2018-01-11 PROCEDURE — 93294 REM INTERROG EVL PM/LDLS PM: CPT | Performed by: INTERNAL MEDICINE

## 2018-01-15 ENCOUNTER — TELEPHONE (OUTPATIENT)
Dept: SOCIAL WORK | Facility: HOSPITAL | Age: 83
End: 2018-01-15

## 2018-01-15 RX ORDER — FUROSEMIDE 20 MG/1
TABLET ORAL
Qty: 30 TABLET | Refills: 4 | Status: SHIPPED | OUTPATIENT
Start: 2018-01-15 | End: 2018-06-10 | Stop reason: SDUPTHER

## 2018-01-16 ENCOUNTER — EPISODE CHANGES (OUTPATIENT)
Dept: CASE MANAGEMENT | Facility: OTHER | Age: 83
End: 2018-01-16

## 2018-01-22 ENCOUNTER — OFFICE VISIT (OUTPATIENT)
Dept: INTERNAL MEDICINE | Facility: CLINIC | Age: 83
End: 2018-01-22

## 2018-01-22 VITALS
WEIGHT: 103.9 LBS | BODY MASS INDEX: 19.12 KG/M2 | SYSTOLIC BLOOD PRESSURE: 180 MMHG | TEMPERATURE: 96.9 F | HEART RATE: 87 BPM | HEIGHT: 62 IN | OXYGEN SATURATION: 98 % | DIASTOLIC BLOOD PRESSURE: 96 MMHG

## 2018-01-22 DIAGNOSIS — M54.50 ACUTE MIDLINE LOW BACK PAIN WITHOUT SCIATICA: ICD-10-CM

## 2018-01-22 DIAGNOSIS — E87.6 HYPOKALEMIA: Primary | ICD-10-CM

## 2018-01-22 PROBLEM — A08.4 VIRAL ENTERITIS: Status: RESOLVED | Noted: 2017-02-14 | Resolved: 2018-01-22

## 2018-01-22 LAB
BUN SERPL-MCNC: 16 MG/DL (ref 8–23)
BUN/CREAT SERPL: 19.5 (ref 7–25)
CALCIUM SERPL-MCNC: 9.6 MG/DL (ref 8.6–10.5)
CHLORIDE SERPL-SCNC: 98 MMOL/L (ref 98–107)
CO2 SERPL-SCNC: 30.6 MMOL/L (ref 22–29)
CREAT SERPL-MCNC: 0.82 MG/DL (ref 0.57–1)
GLUCOSE SERPL-MCNC: 93 MG/DL (ref 65–99)
POTASSIUM SERPL-SCNC: 4.1 MMOL/L (ref 3.5–5.2)
SODIUM SERPL-SCNC: 141 MMOL/L (ref 136–145)

## 2018-01-22 PROCEDURE — 99213 OFFICE O/P EST LOW 20 MIN: CPT | Performed by: FAMILY MEDICINE

## 2018-01-22 RX ORDER — NAPROXEN SODIUM 220 MG
220 TABLET ORAL DAILY
Qty: 25 TABLET | Refills: 0
Start: 2018-01-22 | End: 2018-08-30

## 2018-01-22 RX ORDER — LISINOPRIL 10 MG/1
10 TABLET ORAL DAILY
Qty: 30 TABLET | Refills: 6 | Status: SHIPPED | OUTPATIENT
Start: 2018-01-22 | End: 2018-08-30 | Stop reason: SDDI

## 2018-01-22 RX ORDER — ASPIRIN 325 MG
650 TABLET ORAL 2 TIMES DAILY PRN
COMMUNITY
End: 2018-08-30

## 2018-01-22 NOTE — PROGRESS NOTES
Savannah Andres is a 82 y.o. female.      Assessment/Plan   Problem List Items Addressed This Visit        Nervous and Auditory    Acute midline low back pain without sciatica       Other    Hypokalemia - Primary    Relevant Orders    Basic Metabolic Panel (Completed)           Follow-up results of blood work continue present management of low back pain with anti-inflammatories and intermittent use of muscle relaxants      Chief Complaint   Patient presents with   • [facility] ER for back pain     Social History   Substance Use Topics   • Smoking status: Never Smoker   • Smokeless tobacco: Never Used   • Alcohol use No       History of Present Illness   Here for follow-up of evaluation for back pain is gradually improving seen on January 9 sleeve movement related back pain with no specific injury is not having any loss of bowel or bladder function no weakness in her lower extremities x-ray revealed chronic changes nothing acute.  The following portions of the patient's history were reviewed and updated as appropriate:PMHroutine: Social history , Past Medical History, Allergies, Current Medications, Active Problem List, Family History and Health Maintenance    Review of Systems   Constitutional: Negative for appetite change, chills, fever and unexpected weight change.   HENT: Negative.    Eyes: Negative.    Respiratory: Negative for chest tightness, shortness of breath and wheezing.    Cardiovascular: Negative for chest pain and palpitations.   Gastrointestinal: Negative for abdominal distention, abdominal pain and vomiting.   Endocrine: Negative.    Musculoskeletal: Negative for joint swelling.   Skin: Negative for color change and rash.   Allergic/Immunologic: Negative.    Neurological: Negative for weakness and light-headedness.   Hematological: Negative for adenopathy. Does not bruise/bleed easily.   Psychiatric/Behavioral: Negative for confusion and decreased concentration.       Objective   Vitals:     "01/22/18 1059   BP: 180/96   BP Location: Left arm   Patient Position: Sitting   Cuff Size: Adult   Pulse: 87   Temp: 96.9 °F (36.1 °C)   TempSrc: Oral   SpO2: 98%   Weight: 47.1 kg (103 lb 14.4 oz)   Height: 157.5 cm (62\")     Body mass index is 19 kg/(m^2).  Physical Exam   Constitutional: She is oriented to person, place, and time. She appears well-developed and well-nourished. No distress.   HENT:   Head: Normocephalic and atraumatic.   Eyes: Conjunctivae and EOM are normal. Pupils are equal, round, and reactive to light. No scleral icterus.   Neck: Normal range of motion. Neck supple.   Cardiovascular: Normal rate, regular rhythm and normal heart sounds.  Exam reveals no gallop.    No murmur heard.  Pulmonary/Chest: Effort normal and breath sounds normal. No respiratory distress. She has no wheezes. She has no rales. She exhibits no tenderness.   Abdominal: Soft. There is no tenderness.   Musculoskeletal: She exhibits edema and tenderness. She exhibits no deformity.   Neurological: She is alert and oriented to person, place, and time. She has normal reflexes. She displays no atrophy, no tremor and normal reflexes. No sensory deficit. She exhibits normal muscle tone. Coordination normal.   Reflex Scores:       Patellar reflexes are 2+ on the right side and 2+ on the left side.       Achilles reflexes are 2+ on the right side and 2+ on the left side.  Skin: Skin is warm and dry. No rash noted. She is not diaphoretic.   Psychiatric: She has a normal mood and affect. Her behavior is normal. Judgment and thought content normal.   Nursing note and vitals reviewed.    Reviewed Data:  Office Visit on 01/22/2018   Component Date Value Ref Range Status   • Glucose 01/22/2018 93  65 - 99 mg/dL Final   • BUN 01/22/2018 16  8 - 23 mg/dL Final   • Creatinine 01/22/2018 0.82  0.57 - 1.00 mg/dL Final   • eGFR Non  Am 01/22/2018 67  >60 mL/min/1.73 Final    Comment: The MDRD GFR formula is only valid for adults with " stable  renal function between ages 18 and 70.     • eGFR  Am 01/22/2018 81  >60 mL/min/1.73 Final   • BUN/Creatinine Ratio 01/22/2018 19.5  7.0 - 25.0 Final   • Sodium 01/22/2018 141  136 - 145 mmol/L Final   • Potassium 01/22/2018 4.1  3.5 - 5.2 mmol/L Final   • Chloride 01/22/2018 98  98 - 107 mmol/L Final   • Total CO2 01/22/2018 30.6* 22.0 - 29.0 mmol/L Final   • Calcium 01/22/2018 9.6  8.6 - 10.5 mg/dL Final

## 2018-01-24 ENCOUNTER — TELEPHONE (OUTPATIENT)
Dept: INTERNAL MEDICINE | Facility: CLINIC | Age: 83
End: 2018-01-24

## 2018-05-14 ENCOUNTER — CLINICAL SUPPORT NO REQUIREMENTS (OUTPATIENT)
Dept: CARDIOLOGY | Facility: CLINIC | Age: 83
End: 2018-05-14

## 2018-05-14 ENCOUNTER — OFFICE VISIT (OUTPATIENT)
Dept: CARDIOLOGY | Facility: CLINIC | Age: 83
End: 2018-05-14

## 2018-05-14 VITALS
HEART RATE: 83 BPM | DIASTOLIC BLOOD PRESSURE: 84 MMHG | HEIGHT: 62 IN | BODY MASS INDEX: 19.51 KG/M2 | SYSTOLIC BLOOD PRESSURE: 178 MMHG | WEIGHT: 106 LBS

## 2018-05-14 DIAGNOSIS — I10 ESSENTIAL HYPERTENSION: Primary | ICD-10-CM

## 2018-05-14 DIAGNOSIS — I44.2 THIRD DEGREE AV BLOCK (HCC): Primary | ICD-10-CM

## 2018-05-14 DIAGNOSIS — I44.2 CHB (COMPLETE HEART BLOCK) (HCC): ICD-10-CM

## 2018-05-14 PROCEDURE — 93280 PM DEVICE PROGR EVAL DUAL: CPT | Performed by: INTERNAL MEDICINE

## 2018-05-14 PROCEDURE — 99213 OFFICE O/P EST LOW 20 MIN: CPT | Performed by: INTERNAL MEDICINE

## 2018-05-14 PROCEDURE — 93000 ELECTROCARDIOGRAM COMPLETE: CPT | Performed by: INTERNAL MEDICINE

## 2018-05-14 RX ORDER — METOPROLOL SUCCINATE 50 MG/1
50 TABLET, EXTENDED RELEASE ORAL DAILY
Qty: 90 TABLET | Refills: 3 | Status: SHIPPED | OUTPATIENT
Start: 2018-05-14 | End: 2019-05-11 | Stop reason: SDUPTHER

## 2018-05-14 NOTE — PROGRESS NOTES
Subjective:     Encounter Date:05/14/2018      Patient ID: Savannah Andres is a 82 y.o. female.    Chief Complaint: HTN, CHB    History of Present Illness    Dear Dr. Del Angel:    I had the pleasure of seeing the patient in cardiac follow-up today.  As you well know, she is a cece, 82-year-old woman with history of hypertension and heart block.  She had a pacemaker for complete heart block.  It was checked today and is functioning well.      She lives in the [facility].  She gets around pretty well without too much limitation.  She has noticed her blood pressure has been higher over the past several months.  She has not made any changes to her medications or her lifestyle lately.  She thinks that she needs to have her medications titrated upward.        Review of Systems   All other systems reviewed and are negative.        ECG 12 Lead  Date/Time: 5/14/2018 1:31 PM  Performed by: ALHAJI VALDEZ  Authorized by: ALHAJI VALDEZ   Comparison: compared with previous ECG   Similar to previous ECG  Rhythm comments: AV sequential pacing  BPM: 83                 Objective:     Physical Exam   Constitutional: She is oriented to person, place, and time. She appears well-developed and well-nourished.   HENT:   Head: Normocephalic and atraumatic.   Neck: Normal range of motion. Neck supple.   Cardiovascular: Normal rate, regular rhythm and normal heart sounds.    Pulmonary/Chest: Effort normal and breath sounds normal.   Abdominal: Soft. Bowel sounds are normal.   Musculoskeletal: Normal range of motion.   Neurological: She is alert and oriented to person, place, and time.   Skin: Skin is warm and dry.   Psychiatric: She has a normal mood and affect. Her behavior is normal. Thought content normal.       Lab Review:       Assessment:          Diagnosis Plan   1. Essential hypertension     2. CHB (complete heart block)            Plan:       It was a pleasure to see your patient in cardiac follow-up today.  She is doing very well from the  cardiac standpoint.  I have increased the dose of her Toprol to 50 mg daily due to her hypertension.  I have reviewed her blood pressure log, which seems very consistent.     She will track her blood pressures over time and contact me for any changes.  Otherwise, I will see her again in six months.      Her pacemaker is functioning well and no changes were made today.

## 2018-06-11 RX ORDER — FUROSEMIDE 20 MG/1
TABLET ORAL
Qty: 30 TABLET | Refills: 2 | Status: SHIPPED | OUTPATIENT
Start: 2018-06-11 | End: 2018-09-07 | Stop reason: SDUPTHER

## 2018-08-14 ENCOUNTER — CLINICAL SUPPORT NO REQUIREMENTS (OUTPATIENT)
Dept: CARDIOLOGY | Facility: CLINIC | Age: 83
End: 2018-08-14

## 2018-08-14 DIAGNOSIS — I44.2 COMPLETE HEART BLOCK (HCC): Primary | ICD-10-CM

## 2018-08-14 PROCEDURE — 93296 REM INTERROG EVL PM/IDS: CPT | Performed by: INTERNAL MEDICINE

## 2018-08-14 PROCEDURE — 93294 REM INTERROG EVL PM/LDLS PM: CPT | Performed by: INTERNAL MEDICINE

## 2018-08-30 ENCOUNTER — OFFICE VISIT (OUTPATIENT)
Dept: SURGERY | Facility: CLINIC | Age: 83
End: 2018-08-30

## 2018-08-30 ENCOUNTER — OFFICE VISIT (OUTPATIENT)
Dept: INTERNAL MEDICINE | Facility: CLINIC | Age: 83
End: 2018-08-30

## 2018-08-30 VITALS
WEIGHT: 107.4 LBS | HEART RATE: 76 BPM | SYSTOLIC BLOOD PRESSURE: 150 MMHG | BODY MASS INDEX: 19.77 KG/M2 | DIASTOLIC BLOOD PRESSURE: 90 MMHG | HEIGHT: 62 IN | OXYGEN SATURATION: 94 %

## 2018-08-30 VITALS
HEART RATE: 73 BPM | OXYGEN SATURATION: 97 % | SYSTOLIC BLOOD PRESSURE: 192 MMHG | BODY MASS INDEX: 19.54 KG/M2 | WEIGHT: 106.2 LBS | DIASTOLIC BLOOD PRESSURE: 94 MMHG | TEMPERATURE: 97.9 F | HEIGHT: 62 IN

## 2018-08-30 DIAGNOSIS — L08.9 INFECTED SEBACEOUS CYST OF SKIN: Primary | ICD-10-CM

## 2018-08-30 DIAGNOSIS — L72.3 INFECTED SEBACEOUS CYST OF SKIN: Primary | ICD-10-CM

## 2018-08-30 DIAGNOSIS — L72.3 INFLAMED SEBACEOUS CYST: Primary | ICD-10-CM

## 2018-08-30 PROCEDURE — 99213 OFFICE O/P EST LOW 20 MIN: CPT | Performed by: FAMILY MEDICINE

## 2018-08-30 PROCEDURE — 10060 I&D ABSCESS SIMPLE/SINGLE: CPT | Performed by: SURGERY

## 2018-08-30 PROCEDURE — 99203 OFFICE O/P NEW LOW 30 MIN: CPT | Performed by: SURGERY

## 2018-08-30 RX ORDER — LISINOPRIL 5 MG/1
5 TABLET ORAL DAILY
COMMUNITY
End: 2018-11-14 | Stop reason: SDUPTHER

## 2018-08-30 RX ORDER — SULFAMETHOXAZOLE AND TRIMETHOPRIM 800; 160 MG/1; MG/1
1 TABLET ORAL 2 TIMES DAILY
Qty: 28 TABLET | Refills: 0 | Status: SHIPPED | OUTPATIENT
Start: 2018-08-30 | End: 2018-09-13

## 2018-08-30 NOTE — PROGRESS NOTES
"Savannah Andres is a 83 y.o. female.      Assessment/Plan   Problem List Items Addressed This Visit        Musculoskeletal and Integument    Inflamed sebaceous cyst - Primary           Appointment made with Dr. Tracey Garnica 1:40 today Rochelle  15 minutes spent face-to-face with patient regarding disease pathology and progression and explanation of why the cyst needs to be drained follow-up care    Chief Complaint   Patient presents with   • cyst on back   • **patient states that  Dr. Angel prescribes all medications     Social History   Substance Use Topics   • Smoking status: Never Smoker   • Smokeless tobacco: Never Used   • Alcohol use No       History of Present Illness   Patient appointment for acute problem of skin lesion on her back that she's noticed since Monday not improving gradually getting worse recommended to have physician appointment by her living [facility] is not given any treatment patient's persistent  The following portions of the patient's history were reviewed and updated as appropriate:PMHroutine: Social history , Past Medical History, Allergies, Current Medications, Active Problem List and Health Maintenance    Review of Systems   Constitutional: Negative.    HENT: Negative.    Cardiovascular: Negative.        Objective   Vitals:    08/30/18 0807   BP: (!) 192/94   BP Location: Left arm   Patient Position: Sitting   Cuff Size: Adult   Pulse: 73   Temp: 97.9 °F (36.6 °C)   TempSrc: Oral   SpO2: 97%   Weight: 48.2 kg (106 lb 3.2 oz)   Height: 157.5 cm (62\")     Body mass index is 19.42 kg/m².  Physical Exam   Constitutional: She appears well-developed and well-nourished.   HENT:   Head: Normocephalic and atraumatic.   Skin:   Backlesion sebaceous cyst with erythema 3 x 5 cm subcutaneous well-circumscribed   Psychiatric: She has a normal mood and affect. Her behavior is normal. Judgment and thought content normal.   Nursing note and vitals reviewed.    Reviewed Data:  No visits with " results within 1 Month(s) from this visit.   Latest known visit with results is:   Office Visit on 01/22/2018   Component Date Value Ref Range Status   • Glucose 01/22/2018 93  65 - 99 mg/dL Final   • BUN 01/22/2018 16  8 - 23 mg/dL Final   • Creatinine 01/22/2018 0.82  0.57 - 1.00 mg/dL Final   • eGFR Non  Am 01/22/2018 67  >60 mL/min/1.73 Final    Comment: The MDRD GFR formula is only valid for adults with stable  renal function between ages 18 and 70.     • eGFR  Am 01/22/2018 81  >60 mL/min/1.73 Final   • BUN/Creatinine Ratio 01/22/2018 19.5  7.0 - 25.0 Final   • Sodium 01/22/2018 141  136 - 145 mmol/L Final   • Potassium 01/22/2018 4.1  3.5 - 5.2 mmol/L Final   • Chloride 01/22/2018 98  98 - 107 mmol/L Final   • Total CO2 01/22/2018 30.6* 22.0 - 29.0 mmol/L Final   • Calcium 01/22/2018 9.6  8.6 - 10.5 mg/dL Final

## 2018-09-07 RX ORDER — FUROSEMIDE 20 MG/1
TABLET ORAL
Qty: 30 TABLET | Refills: 1 | Status: SHIPPED | OUTPATIENT
Start: 2018-09-07 | End: 2018-11-14 | Stop reason: SDUPTHER

## 2018-09-18 NOTE — PROGRESS NOTES
Procedure   Procedures    Procedure:  After the patient was consented, patient was positioned in the prone position and the upper back abscessed area was prepped with betadine and anesthetized with 1% Xylocaine.  Using an 11 blade scalpel, the fluctuant area of the abscess was incised with copious amounts of purulent material expressed.  The incision was extended to approximately 2 cm.  The abscess cavity was irrigated with hydrogen peroxide and packed with quarter inch iodoform.  A sterile dressing was applied.  The patient was instructed on removing packing in a.m. and completing antibiotics.  I have advised the patient to have the infected sebaceous cyst completely excised in the operating room once the acute infection had resolved.

## 2018-10-04 ENCOUNTER — TELEPHONE (OUTPATIENT)
Dept: INTERNAL MEDICINE | Facility: CLINIC | Age: 83
End: 2018-10-04

## 2018-10-04 DIAGNOSIS — L60.0 INGROWN TOENAIL: Primary | ICD-10-CM

## 2018-10-04 NOTE — TELEPHONE ENCOUNTER
Patient called stating that she has an ingrown toenail and wanted to know if she could be referred to a podiatrist.

## 2018-11-14 RX ORDER — FUROSEMIDE 20 MG/1
TABLET ORAL
Qty: 90 TABLET | Refills: 2 | Status: SHIPPED | OUTPATIENT
Start: 2018-11-14 | End: 2019-08-10 | Stop reason: SDUPTHER

## 2018-11-14 RX ORDER — LISINOPRIL 5 MG/1
TABLET ORAL
Qty: 90 TABLET | Refills: 1 | Status: SHIPPED | OUTPATIENT
Start: 2018-11-14 | End: 2019-05-11 | Stop reason: SDUPTHER

## 2018-12-05 ENCOUNTER — CLINICAL SUPPORT NO REQUIREMENTS (OUTPATIENT)
Dept: CARDIOLOGY | Facility: CLINIC | Age: 83
End: 2018-12-05

## 2018-12-05 ENCOUNTER — OFFICE VISIT (OUTPATIENT)
Dept: CARDIOLOGY | Facility: CLINIC | Age: 83
End: 2018-12-05

## 2018-12-05 VITALS
HEIGHT: 62 IN | BODY MASS INDEX: 20.06 KG/M2 | DIASTOLIC BLOOD PRESSURE: 80 MMHG | HEART RATE: 83 BPM | WEIGHT: 109 LBS | SYSTOLIC BLOOD PRESSURE: 140 MMHG

## 2018-12-05 DIAGNOSIS — Z95.0 S/P PLACEMENT OF CARDIAC PACEMAKER: ICD-10-CM

## 2018-12-05 DIAGNOSIS — I44.2 COMPLETE HEART BLOCK (HCC): Primary | ICD-10-CM

## 2018-12-05 PROCEDURE — 93280 PM DEVICE PROGR EVAL DUAL: CPT | Performed by: INTERNAL MEDICINE

## 2018-12-05 PROCEDURE — 93000 ELECTROCARDIOGRAM COMPLETE: CPT | Performed by: INTERNAL MEDICINE

## 2018-12-05 PROCEDURE — 99213 OFFICE O/P EST LOW 20 MIN: CPT | Performed by: INTERNAL MEDICINE

## 2018-12-05 NOTE — PROGRESS NOTES
Subjective:     Encounter Date:12/05/2018      Patient ID: Savannah Andres is a 83 y.o. female.    Chief Complaint: CHB, s/p PPM    History of Present Illness    Dear Dr. Del Angel:    I had the pleasure of seeing this patient in cardiac follow up today.  As you well know, she is a cece 83-year-old woman with history of complete heart block, status post implantation of a permanent pacemaker.  It was checked today and is functioning well.  She has no history of hypertension, but this is now well controlled.    She lives at the [facility].  She says that she has gotten back into climbing stairs.  She can get up two flights of stairs to her apartment without any difficulty.    She has some intermittent left-sided chest discomfort which is vague and mild.  It does not seem to limit her activity in any way.        Review of Systems   All other systems reviewed and are negative.        ECG 12 Lead  Date/Time: 12/5/2018 9:39 AM  Performed by: Josef Angel MD  Authorized by: Josef Angel MD   Comparison: compared with previous ECG   Similar to previous ECG  Rhythm comments: AV-sequentially paced rhythm  BPM: 83                 Objective:     Physical Exam   Constitutional: She is oriented to person, place, and time. She appears well-developed and well-nourished.   HENT:   Head: Normocephalic and atraumatic.   Neck: Normal range of motion. Neck supple.   Cardiovascular: Normal rate, regular rhythm and normal heart sounds.   Pulmonary/Chest: Effort normal and breath sounds normal.   Abdominal: Soft. Bowel sounds are normal.   Musculoskeletal: Normal range of motion.   Neurological: She is alert and oriented to person, place, and time.   Skin: Skin is warm and dry.   Psychiatric: She has a normal mood and affect. Her behavior is normal. Thought content normal.   Vitals reviewed.      Lab Review:       Assessment:          Diagnosis Plan   1. Complete heart block (CMS/HCC)     2. S/P placement of cardiac pacemaker            Plan:        It was a pleasure to see your patient in cardiac follow up today.  She is doing great from the cardiac standpoint without any complaints.  I have made no changes to her medical regimen at this time.  She will see me again in 6 months or sooner if symptoms warrant.

## 2019-05-13 RX ORDER — LISINOPRIL 5 MG/1
TABLET ORAL
Qty: 90 TABLET | Refills: 3 | Status: SHIPPED | OUTPATIENT
Start: 2019-05-13 | End: 2019-06-04 | Stop reason: SDUPTHER

## 2019-05-13 RX ORDER — METOPROLOL SUCCINATE 50 MG/1
TABLET, EXTENDED RELEASE ORAL
Qty: 90 TABLET | Refills: 3 | Status: SHIPPED | OUTPATIENT
Start: 2019-05-13 | End: 2020-05-18 | Stop reason: SDUPTHER

## 2019-06-04 ENCOUNTER — OFFICE VISIT (OUTPATIENT)
Dept: INTERNAL MEDICINE | Facility: CLINIC | Age: 84
End: 2019-06-04

## 2019-06-04 VITALS
HEART RATE: 68 BPM | TEMPERATURE: 98.7 F | BODY MASS INDEX: 19.83 KG/M2 | OXYGEN SATURATION: 98 % | WEIGHT: 108.4 LBS | SYSTOLIC BLOOD PRESSURE: 166 MMHG | DIASTOLIC BLOOD PRESSURE: 82 MMHG

## 2019-06-04 DIAGNOSIS — I50.22 CHRONIC SYSTOLIC CONGESTIVE HEART FAILURE, NYHA CLASS 3 (HCC): Primary | ICD-10-CM

## 2019-06-04 DIAGNOSIS — I10 ESSENTIAL HYPERTENSION: ICD-10-CM

## 2019-06-04 PROBLEM — I38 VALVULAR HEART DISEASE: Status: ACTIVE | Noted: 2019-06-04

## 2019-06-04 PROBLEM — Z95.0 PRESENCE OF PERMANENT CARDIAC PACEMAKER: Status: ACTIVE | Noted: 2019-06-04

## 2019-06-04 PROCEDURE — G0438 PPPS, INITIAL VISIT: HCPCS | Performed by: FAMILY MEDICINE

## 2019-06-04 PROCEDURE — 99214 OFFICE O/P EST MOD 30 MIN: CPT | Performed by: FAMILY MEDICINE

## 2019-06-04 RX ORDER — LISINOPRIL 10 MG/1
10 TABLET ORAL DAILY
Qty: 90 TABLET | Refills: 2 | Status: SHIPPED | OUTPATIENT
Start: 2019-06-04 | End: 2019-06-20 | Stop reason: SINTOL

## 2019-06-04 NOTE — PROGRESS NOTES
Date of Office Visit: 2019  Encounter Provider: MERYL Lord  Place of Service: [facility] CARDIOLOGY  Patient Name: Savannah Andres  :1935      Subjective:     Chief Complaint:  6-month follow-up visit for complete heart block with PPM and HTN    History of Present Illness:  Savannah Andres is a pleasant 83 y.o. female who is new to me.  Outside records have been obtained and reviewed by me.     This is a patient of Dr. Angel with history of complete heart block status post implementation of permanent pacemaker in 2016 and hypertension. She developed acute systolic congestive heart failure immediately after pacemaker implantation that responded to diuresis.  I do not see a recent echocardiogram since 16 echo but it was reported by Dr. Angel that her LV function was checked after recovery and was normal.    She was last in the office on 2018 it was reported that her blood pressure was controlled now.  At her last visit she was living at the [facility] and had gotten back into climbing stairs and was able to climb 2 flights of stairs to her apartment without any difficulty.  She was having some intermittent left-sided chest discomfort which was mild and vague and was not limiting her activity in any way.  Her device was interrogated that day and was reportedly functioning well.  After 2 felt she was doing well from a cardiac standpoint and had no complaints and wanted to see her in 6 months or sooner if symptoms warranted.    She is presenting today for 6-month follow-up visit.  Patient had her pacemaker interrogated today which showed normal DDDR pacer function.  A paced 62%, V paced 100%.  Battery life estimated 8 years 2 months.  No events recorded.  At today's visit she has been doing well without any new issues/complaints.  She has not had any problems with chest pain, shortness of breath, PND, or orthopnea.  She denies any dizziness, lightheadedness, syncope  or near syncope.  She does have some mild fatigue.  She has no symptoms that are limiting any of her activity.  She does have to climb 3 flights of stairs for her apartment and is able to do so without any difficulty T although she states she does this slowly and carefully.  She occasionally will have some ankle edema that is worse in the evening if she does a lot of walking or activity during the day.  She has not tried compression stockings.  At [facility] she has had her blood pressure checked and on numerous occasions over the last year 2018 nurse suggested that her blood pressure was too high.  She also states her primary care physician Dr. Del Angel has also tried to increase her lisinopril from 5 mg to 10 mg on numerous occasions.  At her most recent visit yesterday he wanted to increase the dosage but she wanted to wait until she spoke with our office.  She denies any headaches.  She has some blurred vision for which she wears glasses that has not necessarily worsened and she also experiences double vision intermittently typically at nighttime if she has to look at a distance.  Visual issues have both been ongoing for well over a year. She denies any numbness/tingling, weakness in any extremities or speech issues.  She reports increased thirst especially in the evening and she has noticed ever since she has been on the furosemide.  She denies any nausea, vomiting, diarrhea or abdominal pain.  She was wondering if this was the source.  The patient confirmed that Dr. chiu discuss with her repeat echocardiogram showing normalization of her heart function after pacemaker was placed.  I still do not see results from this echocardiogram.     Last device check on 3/15/2019 showed normal dual-chamber pacemaker function a paced 59.3% V paced 100%.  2 mode switches with no episodes reported.         Past Medical History:   Diagnosis Date   • Bilateral leg edema    • CHB (complete heart block) (CMS/[facility])     3rd degree   • CHF  (congestive heart failure) (CMS/HCC)    • Hypertension    • Loss of consciousness (CMS/HCC)    • Skin cyst    • SOB (shortness of breath)    • Symptomatic bradycardia    • Syncope      Past Surgical History:   Procedure Laterality Date   • CARDIAC ELECTROPHYSIOLOGY PROCEDURE N/A 11/23/2016    Procedure: Pacemaker DC new  [employer];  Surgeon: Ananth Malcolm MD;  Location: [facility] INVASIVE LOCATION;  Service:    • PACEMAKER IMPLANTATION       Outpatient Medications Prior to Visit   Medication Sig Dispense Refill   • furosemide (LASIX) 20 MG tablet TAKE ONE TABLET BY MOUTH DAILY 90 tablet 2   • lisinopril (PRINIVIL,ZESTRIL) 10 MG tablet Take 1 tablet by mouth Daily. (Patient taking differently: Take 5 mg by mouth Daily.) 90 tablet 2   • metoprolol succinate XL (TOPROL-XL) 50 MG 24 hr tablet TAKE ONE TABLET BY MOUTH DAILY 90 tablet 3     No facility-administered medications prior to visit.        Allergies as of 06/05/2019   • (No Known Allergies)     Social History     Socioeconomic History   • Marital status: Single     Spouse name: Not on file   • Number of children: 3   • Years of education: Not on file   • Highest education level: Not on file   Occupational History   • Occupation: unknown   Tobacco Use   • Smoking status: Never Smoker   • Smokeless tobacco: Never Used   • Tobacco comment: caffeine use   Substance and Sexual Activity   • Alcohol use: No   • Drug use: No   • Sexual activity: Defer     Family History   Problem Relation Age of Onset   • Hypertension Mother    • Hypertension Father      Review of Systems   Constitution: Positive for malaise/fatigue. Negative for chills and fever.   HENT: Negative for ear pain, hearing loss, nosebleeds and sore throat.    Eyes: Positive for blurred vision (not new or changed- ongoing for over a year) and double vision (occasional with distant at night ongoing for over a year). Negative for pain, vision loss in left eye and vision loss in right eye.   Cardiovascular:  "Positive for leg swelling. Negative for chest pain, claudication, dyspnea on exertion, irregular heartbeat, near-syncope, orthopnea, palpitations, paroxysmal nocturnal dyspnea and syncope.        Leg pain with walking   Respiratory: Negative for cough, shortness of breath, snoring and wheezing.    Endocrine: Positive for polyuria. Negative for cold intolerance and heat intolerance.   Hematologic/Lymphatic: Negative for bleeding problem.   Skin: Negative for color change, itching, rash and unusual hair distribution.   Musculoskeletal: Negative for joint pain and joint swelling.   Gastrointestinal: Positive for abdominal pain and diarrhea. Negative for hematochezia, melena, nausea and vomiting.   Genitourinary: Negative for decreased libido, frequency, hematuria, hesitancy and incomplete emptying.   Neurological: Positive for dizziness and light-headedness. Negative for excessive daytime sleepiness, headaches, loss of balance, numbness, paresthesias and seizures.   Psychiatric/Behavioral: Negative for depression.          Objective:     Vitals:    06/05/19 1400   BP: 160/90   BP Location: Left arm   Patient Position: Sitting   Pulse: 75   Weight: 47.7 kg (105 lb 3.2 oz)   Height: 157.5 cm (62\")     Body mass index is 19.24 kg/m².    PHYSICAL EXAM:  Physical Exam   Constitutional: She is oriented to person, place, and time. She appears well-developed and well-nourished. No distress.   HENT:   Head: Normocephalic and atraumatic.   Eyes: Conjunctivae and EOM are normal. Pupils are equal, round, and reactive to light.   Neck: No JVD present. Carotid bruit is not present.   Cardiovascular: Normal rate, regular rhythm, normal heart sounds and intact distal pulses.   No murmur heard.  Pulses:       Radial pulses are 2+ on the right side, and 2+ on the left side.        Dorsalis pedis pulses are 2+ on the right side, and 2+ on the left side.        Posterior tibial pulses are 2+ on the right side, and 2+ on the left side. "   No lower extremity edema present on exam today   Pulmonary/Chest: Effort normal and breath sounds normal. No accessory muscle usage. No tachypnea. No respiratory distress. She has no decreased breath sounds. She has no wheezes. She has no rhonchi. She has no rales. She exhibits no tenderness.   PPM   Abdominal: Soft. Bowel sounds are normal. She exhibits no distension. There is no tenderness. There is no rebound and no guarding.   Musculoskeletal: Normal range of motion. She exhibits no edema.   Neurological: She is alert and oriented to person, place, and time.   Skin: Skin is warm, dry and intact. She is not diaphoretic. No erythema.   Psychiatric: She has a normal mood and affect. Her speech is normal and behavior is normal. Judgment and thought content normal. Cognition and memory are normal.   Nursing note and vitals reviewed.      Procedures    Previous Testin2016 Echo:  Left Ventricle Left ventricular function is moderately decreased. Calculated EF = 38%. Estimated EF was in agreement with the calculated EF. Estimated EF = 38%. Global strain = -16%. Strain data was reviewed by physician. Normal left ventricular cavity size and wall thickness noted. Global left ventricular wall motion appears abnormal. Septal wall motion is abnormal. Left ventricular diastolic dysfunction is noted (grade III w/high LAP) consistent with reversible restrictive pattern. Elevated left atrial pressure. There is no evidence of a left ventricular mass or thrombus present.   Right Ventricle Normal cavity size, wall thickness, systolic function and septal motion noted. Electronic lead present in the ventricle.   Left Atrium Left atrial cavity size is borderline dilated.   Right Atrium Normal right atrial size noted. The inferior vena cava is dilated. Partial IVC inspiratory collapse of less than 50% noted. An electronic lead is present in the right atrium.   Aortic Valve The valve appears trileaflet. The aortic valve is  abnormal in structure. The valve exhibits sclerosis. Mild aortic valve regurgitation is present. No aortic valve stenosis is present.   Mitral Valve The mitral valve is abnormal in structure. Abnormal structure is consistent with non-specific thickening. Moderate mitral valve regurgitation is present with an eccentric jet noted. No significant mitral valve stenosis is present.   Tricuspid Valve The tricuspid valve is normal. No tricuspid valve stenosis is present. Moderate to severe tricuspid valve regurgitation is present. Severe pulmonary hypertension is present. Calculated PA pressure of 58 mmHg.   Pulmonic Valve The pulmonic valve is structurally normal. There is no significant pulmonic valve stenosis present. There is no pulmonic valve regurgitation present.   Greater Vessels No dilation of the aortic root is present.   Pericardium The pericardium is normal. There is no evidence of pericardial effusion.       Assessment:       Diagnosis Plan   1. Essential hypertension     2. Third degree heart block (CMS/HCC)     3. Presence of permanent cardiac pacemaker     4. Valvular heart disease         Plan:     1. Complete heart block: Status post PPM insertion November 2016.  Functioning appropriately today.    2. Hypertension: Blood pressure is 160/90  today.  On Toprol XL, Lisinopril and Furosemide.  Underlying has been trying to increase her lisinopril but the patient has not done so.  After discussing with her she well to take 2 or 5 mg pills until she is close to running out then request further refills of the 10 mg tablets per Dr. Del Angel.  She has repeat BMP orders in the computer per PCP Dr. Del Angel.  Follow low-sodium diet.    3. Cardiomyopathy: EF 38% on November 2016 echocardiogram with no regional wall motion abnormalities but global hypokinesis. According to Dr. Angel she has had a repeat echo following her CHF exac in Nov. 2016 and per DR. Angel her LV function normalized on Toprol XL, Lisinopril and Furosemide.  No  signs of heart failure on exam.    4. Grade 3 diastolic dysfunction: on November 2016 echocardiogram    5. Mitral regurgitation: Moderate on November 2016 echocardiogram    6. Tricuspid regurgitation: Moderate to severe On November 2016 echocardiogram    7. Aortic regurgitation: Mild on November 2016 echocardiogram    8. Severe pulmonary hypertension: Calculated PA pressure of  58 mmHg on November 2016 echocardiogram.  She denies any shortness of breath.    9.  Occasional ankle edema: Suggested if she is going to be doing a lot of standing or walking to try compression stockings.  She demonstrated understanding.    Overall the patient is doing well from a cardiac standpoint.  She has no new symptoms suggestive of worsening heart failure or valvular disease.  She denies any shortness of breath.  Her blood pressure has continued to remain high but after discussion with her she is agreeable to increase the lisinopril 10 mg daily as recommended by her PCP Dr. Del Angel and start monitoring her blood pressure at home.  In regards to her increased thirst I told her she can trial holding her furosemide for a couple days and see if this makes a difference.  I do want her back on the furosemide however as her blood pressures been high.  She demonstrated understanding.  In regards to her visual symptoms (although not new) she was advised to discuss with Dr. Del Angel a referral to ophthalmologist or to schedule herself an appointment.  She demonstrated understanding.  We discussed about options for who she can transition her cardiac care to.  She prefers to wait and see one of the new physicians joining our group in July 2019.    Follow up with new provider for Dr. Angel in 6 months, unless otherwise needed sooner.  I advised the patient to contact our office with any questions or concerns.         Your medication list           Accurate as of 6/5/19  4:11 PM. If you have any questions, ask your nurse or doctor.               CHANGE how  you take these medications      Instructions Last Dose Given Next Dose Due   lisinopril 10 MG tablet  Commonly known as:  PRINIVIL,ZESTRIL  What changed:  how much to take      Take 1 tablet by mouth Daily.          CONTINUE taking these medications      Instructions Last Dose Given Next Dose Due   furosemide 20 MG tablet  Commonly known as:  LASIX      TAKE ONE TABLET BY MOUTH DAILY       metoprolol succinate XL 50 MG 24 hr tablet  Commonly known as:  TOPROL-XL      TAKE ONE TABLET BY MOUTH DAILY              The above medication changes may not have been made by this provider.  Medication list was updated to reflect medications patient is currently taking including medication changes and discontinuations made by other healthcare providers.       It has been a pleasure to participate in this patient's care. Please feel free to contact me with any questions or concerns.     Liliana George, APRN  06/05/2019       Dictated utilizing Dragon Dictation System.

## 2019-06-04 NOTE — PROGRESS NOTES
QUICK REFERENCE INFORMATION:  The ABCs of the Annual Wellness Visit    Initial Medicare Wellness Visit    HEALTH RISK ASSESSMENT    : 1935    Recent Hospitalizations:  No hospitalization(s) within the last year..  ccc      Current Medical Providers:  Patient Care Team:  Vasu Del Angel MD as PCP - General (Family Medicine)  Josef Angel MD as PCP - Claims Attributed        Smoking Status:  Social History     Tobacco Use   Smoking Status Never Smoker   Smokeless Tobacco Never Used       Alcohol Consumption:  Social History     Substance and Sexual Activity   Alcohol Use No       Depression Screen:   PHQ-2/PHQ-9 Depression Screening 2019   Little interest or pleasure in doing things 0   Feeling down, depressed, or hopeless 0   Total Score 0       Health Habits and Functional and Cognitive Screening:  Functional & Cognitive Status 2019   Do you have difficulty preparing food and eating? No   Do you have difficulty bathing yourself, getting dressed or grooming yourself? No   Do you have difficulty using the toilet? No   Do you have difficulty moving around from place to place? No   Do you have trouble with steps or getting out of a bed or a chair? No   In the past year have you fallen or experienced a near fall? No   Current Diet Well Balanced Diet   Dental Exam Not up to date   Eye Exam Not up to date   Exercise (times per week) 7 times per week   Current Exercise Activities Include Walking   Do you need help using the phone?  No   Are you deaf or do you have serious difficulty hearing?  No   Do you need help with transportation? Yes   Do you need help shopping? Yes   Do you need help preparing meals?  No   Do you need help with housework?  No   Do you need help with laundry? No   Do you need help taking your medications? No   Do you need help managing money? Yes   Do you ever drive or ride in a car without wearing a seat belt? No   Have you felt unusual stress, anger or loneliness in the last month?  Yes   Who do you live with? Community   If you need help, do you have trouble finding someone available to you? No   Have you been bothered in the last four weeks by sexual problems? No   Do you have difficulty concentrating, remembering or making decisions? Yes           Does the patient have evidence of cognitive impairment? No    Asiprin use counseling: Does not need ASA (and currently is not on it)      Recent Lab Results:    Lab Results   Component Value Date    GLU 93 01/22/2018     Lab Results   Component Value Date    HGBA1C 6.51 (H) 12/12/2016              Age-appropriate Screening Schedule:  Refer to the list below for future screening recommendations based on patient's age, sex and/or medical conditions. Orders for these recommended tests are listed in the plan section. The patient has been provided with a written plan.    Health Maintenance   Topic Date Due   • PNEUMOCOCCAL VACCINES (65+ LOW/MEDIUM RISK) (1 of 2 - PCV13) 06/04/2019 (Originally 7/15/2000)   • TDAP/TD VACCINES (1 - Tdap) 06/04/2019 (Originally 7/15/1954)   • ZOSTER VACCINE (1 of 2) 06/04/2019 (Originally 7/15/1985)   • INFLUENZA VACCINE  08/01/2019        Subjective   History of Present Illness    Savannah Andres is a 83 y.o. female who presents for an Annual Wellness Visit.    The following portions of the patient's history were reviewed and updated as appropriate: allergies, current medications, past family history, past medical history, past social history, past surgical history and problem list.    Outpatient Medications Prior to Visit   Medication Sig Dispense Refill   • furosemide (LASIX) 20 MG tablet TAKE ONE TABLET BY MOUTH DAILY 90 tablet 2   • metoprolol succinate XL (TOPROL-XL) 50 MG 24 hr tablet TAKE ONE TABLET BY MOUTH DAILY 90 tablet 3   • lisinopril (PRINIVIL,ZESTRIL) 5 MG tablet TAKE ONE TABLET BY MOUTH DAILY 90 tablet 3     No facility-administered medications prior to visit.        Patient Active Problem List   Diagnosis    • Third degree heart block (CMS/HCC)   • CHF (congestive heart failure), NYHA class III (CMS/HCC)   • Hyperglycemia   • Hypokalemia   • Acute midline low back pain without sciatica   • Inflamed sebaceous cyst   • Hypertension       Advance Care Planning:  Patient does not have an advance directive - information provided to the patient today    Identification of Risk Factors:  Risk factors include: weight  and cardiovascular risk.    Review of Systems    Compared to one year ago, the patient feels her physical health is the same.  Compared to one year ago, the patient feels her mental health is the same.    Objective     Physical Exam    Vitals:    06/04/19 1325   BP: 166/82   BP Location: Right arm   Patient Position: Sitting   Cuff Size: Adult   Pulse: 68   Temp: 98.7 °F (37.1 °C)   TempSrc: Oral   SpO2: 98%   Weight: 49.2 kg (108 lb 6.4 oz)   PainSc: 0-No pain       Patient's Body mass index is 19.83 kg/m². BMI is below normal parameters. Recommendations include: treating the underlying disease process.      Assessment/Plan   Patient Self-Management and Personalized Health Advice  The patient has been provided with information about: weight management and designing advance directives and preventive services including:   · Advance directive.    Visit Diagnoses:    ICD-10-CM ICD-9-CM   1. Chronic systolic congestive heart failure, NYHA class 3 (CMS/HCC) I50.22 428.22     428.0   2. Essential hypertension I10 401.9       Orders Placed This Encounter   Procedures   • Basic Metabolic Panel       Outpatient Encounter Medications as of 6/4/2019   Medication Sig Dispense Refill   • furosemide (LASIX) 20 MG tablet TAKE ONE TABLET BY MOUTH DAILY 90 tablet 2   • lisinopril (PRINIVIL,ZESTRIL) 10 MG tablet Take 1 tablet by mouth Daily. 90 tablet 2   • metoprolol succinate XL (TOPROL-XL) 50 MG 24 hr tablet TAKE ONE TABLET BY MOUTH DAILY 90 tablet 3   • [DISCONTINUED] lisinopril (PRINIVIL,ZESTRIL) 5 MG tablet TAKE ONE TABLET  BY MOUTH DAILY 90 tablet 3     No facility-administered encounter medications on file as of 6/4/2019.        Reviewed use of high risk medication in the elderly: yes  Reviewed for potential of harmful drug interactions in the elderly: yes    Follow Up:  Return in about 9 months (around 3/4/2020), or if symptoms worsen or fail to improve, for Recheck, Next scheduled follow up.     An After Visit Summary and PPPS with all of these plans were given to the patient.

## 2019-06-04 NOTE — PROGRESS NOTES
Savannah Andres is a 83 y.o. female.      Assessment/Plan   Problem List Items Addressed This Visit        Cardiovascular and Mediastinum    CHF (congestive heart failure), NYHA class III (CMS/[facility]) - Primary    Relevant Orders    Basic Metabolic Panel    Hypertension    Relevant Medications    lisinopril (PRINIVIL,ZESTRIL) 10 MG tablet           Recommend increasing lisinopril to 10 mg daily monitor blood pressure watch for signs and symptoms of heart failure obtain BMP in 1 month as well as recurrence of diarrhea follow-up otherwise as needed or  Return in about 9 months (around 3/4/2020), or if symptoms worsen or fail to improve, for Recheck, Next scheduled follow up.      Chief Complaint   Patient presents with   • diarrhea since Thursday   • Medicare Wellness-Initial Visit   Hypertension CHF  Social History     Tobacco Use   • Smoking status: Never Smoker   • Smokeless tobacco: Never Used   Substance Use Topics   • Alcohol use: No   • Drug use: No       History of Present Illness   Follow-up chronic problems of hypertension CHF Home blood pressure readings greater than 150/90 no chest pain or shortness of breath no increased fluid retention diarrhea loose stool liquid once Sunday then improved no black or bloody  The following portions of the patient's history were reviewed and updated as appropriate:PMHroutine: Social history , Allergies, Current Medications, Active Problem List and Health Maintenance    Review of Systems   Constitutional: Negative.    HENT: Negative.    Respiratory: Negative.    Cardiovascular: Negative.    Genitourinary: Negative.    Musculoskeletal: Negative.    Neurological: Negative.    Hematological: Negative.    Psychiatric/Behavioral: Negative.        Objective   Vitals:    06/04/19 1325   BP: 166/82   BP Location: Right arm   Patient Position: Sitting   Cuff Size: Adult   Pulse: 68   Temp: 98.7 °F (37.1 °C)   TempSrc: Oral   SpO2: 98%   Weight: 49.2 kg (108 lb 6.4 oz)     Body mass index  is 19.83 kg/m².  Physical Exam   Constitutional: She is oriented to person, place, and time. She appears well-developed and well-nourished. No distress.   HENT:   Head: Normocephalic and atraumatic.   Eyes: Conjunctivae and EOM are normal. Pupils are equal, round, and reactive to light. Right eye exhibits no discharge. Left eye exhibits no discharge. No scleral icterus.   Neck: Normal range of motion. Neck supple. No tracheal deviation present. No thyromegaly present.   Cardiovascular: Normal rate, regular rhythm, normal heart sounds, intact distal pulses and normal pulses. Exam reveals no gallop.   No murmur heard.  Pulmonary/Chest: Effort normal and breath sounds normal. No respiratory distress. She has no wheezes. She has no rales.   Musculoskeletal: Normal range of motion. She exhibits no edema.   Neurological: She is alert and oriented to person, place, and time. She exhibits normal muscle tone. Coordination normal.   Skin: Skin is warm. No rash noted. No erythema. No pallor.   Psychiatric: She has a normal mood and affect. Her behavior is normal. Judgment and thought content normal.   Nursing note and vitals reviewed.    Reviewed Data:  No visits with results within 1 Month(s) from this visit.   Latest known visit with results is:   Office Visit on 01/22/2018   Component Date Value Ref Range Status   • Glucose 01/22/2018 93  65 - 99 mg/dL Final   • BUN 01/22/2018 16  8 - 23 mg/dL Final   • Creatinine 01/22/2018 0.82  0.57 - 1.00 mg/dL Final   • eGFR Non  Am 01/22/2018 67  >60 mL/min/1.73 Final    Comment: The MDRD GFR formula is only valid for adults with stable  renal function between ages 18 and 70.     • eGFR  Am 01/22/2018 81  >60 mL/min/1.73 Final   • BUN/Creatinine Ratio 01/22/2018 19.5  7.0 - 25.0 Final   • Sodium 01/22/2018 141  136 - 145 mmol/L Final   • Potassium 01/22/2018 4.1  3.5 - 5.2 mmol/L Final   • Chloride 01/22/2018 98  98 - 107 mmol/L Final   • Total CO2 01/22/2018 30.6* 22.0  - 29.0 mmol/L Final   • Calcium 01/22/2018 9.6  8.6 - 10.5 mg/dL Final

## 2019-06-05 ENCOUNTER — CLINICAL SUPPORT NO REQUIREMENTS (OUTPATIENT)
Dept: CARDIOLOGY | Facility: CLINIC | Age: 84
End: 2019-06-05

## 2019-06-05 ENCOUNTER — OFFICE VISIT (OUTPATIENT)
Dept: CARDIOLOGY | Facility: CLINIC | Age: 84
End: 2019-06-05

## 2019-06-05 VITALS
BODY MASS INDEX: 19.36 KG/M2 | SYSTOLIC BLOOD PRESSURE: 160 MMHG | OXYGEN SATURATION: 97 % | DIASTOLIC BLOOD PRESSURE: 90 MMHG | WEIGHT: 105.2 LBS | HEIGHT: 62 IN | HEART RATE: 75 BPM

## 2019-06-05 DIAGNOSIS — Z95.0 PRESENCE OF PERMANENT CARDIAC PACEMAKER: ICD-10-CM

## 2019-06-05 DIAGNOSIS — I10 ESSENTIAL HYPERTENSION: Primary | ICD-10-CM

## 2019-06-05 DIAGNOSIS — I44.2 THIRD DEGREE HEART BLOCK (HCC): Primary | ICD-10-CM

## 2019-06-05 DIAGNOSIS — I38 VALVULAR HEART DISEASE: ICD-10-CM

## 2019-06-05 DIAGNOSIS — I44.2 THIRD DEGREE HEART BLOCK (HCC): ICD-10-CM

## 2019-06-05 PROCEDURE — 93280 PM DEVICE PROGR EVAL DUAL: CPT | Performed by: INTERNAL MEDICINE

## 2019-06-05 PROCEDURE — 99214 OFFICE O/P EST MOD 30 MIN: CPT | Performed by: NURSE PRACTITIONER

## 2019-06-20 ENCOUNTER — OFFICE VISIT (OUTPATIENT)
Dept: INTERNAL MEDICINE | Facility: CLINIC | Age: 84
End: 2019-06-20

## 2019-06-20 VITALS
WEIGHT: 106.6 LBS | TEMPERATURE: 98.5 F | SYSTOLIC BLOOD PRESSURE: 184 MMHG | BODY MASS INDEX: 19.5 KG/M2 | OXYGEN SATURATION: 98 % | DIASTOLIC BLOOD PRESSURE: 80 MMHG | HEART RATE: 67 BPM

## 2019-06-20 DIAGNOSIS — I10 ESSENTIAL HYPERTENSION: Primary | ICD-10-CM

## 2019-06-20 DIAGNOSIS — I27.20 PULMONARY HYPERTENSION (HCC): ICD-10-CM

## 2019-06-20 DIAGNOSIS — I50.22 CHRONIC SYSTOLIC CONGESTIVE HEART FAILURE, NYHA CLASS 3 (HCC): ICD-10-CM

## 2019-06-20 PROCEDURE — 99214 OFFICE O/P EST MOD 30 MIN: CPT | Performed by: FAMILY MEDICINE

## 2019-06-20 RX ORDER — LOSARTAN POTASSIUM 25 MG/1
25 TABLET ORAL DAILY
Qty: 30 TABLET | Refills: 3 | Status: SHIPPED | OUTPATIENT
Start: 2019-06-20 | End: 2019-07-12 | Stop reason: ALTCHOICE

## 2019-06-20 NOTE — PROGRESS NOTES
Savannah Andres is a 83 y.o. female.      Assessment/Plan   Problem List Items Addressed This Visit        Cardiovascular and Mediastinum    CHF (congestive heart failure), NYHA class III (CMS/HCC)    Hypertension - Primary    Relevant Medications    losartan (COZAAR) 25 MG tablet    Pulmonary hypertension (CMS/HCC)         Stop lisinopril start losartan continue furosemide metoprolol follow-up results of blood work and    Return in about 1 month (around 7/20/2019), or if symptoms worsen or fail to improve, for Recheck, Next scheduled follow up.      Chief Complaint   Patient presents with   • follow up to hypertension     patient went back to taking lisinopril 5 mg daily 10 mg increased thrirst and dry mouth   CHF pulmonary hypertension  Social History     Tobacco Use   • Smoking status: Never Smoker   • Smokeless tobacco: Never Used   • Tobacco comment: caffeine use   Substance Use Topics   • Alcohol use: No   • Drug use: No       History of Present Illness   Follows up for chronic problems of hypertension CHF pulmonary hypertension pacemaker she has no acute concerns although was unable to tolerate increased dose of lisinopril she said it gave her dry mouth she can check her blood pressure once a week she has no chest pain no shortness of breath no increased fatigue just some dry mouth is taking her furosemide daily she has not had a recent electrolyte evaluation  The following portions of the patient's history were reviewed and updated as appropriate:PMHroutine: Social history , Allergies, Current Medications, Active Problem List and Health Maintenance    Review of Systems   Constitutional: Negative.    HENT: Negative.    Respiratory: Negative.    Cardiovascular: Negative.    Gastrointestinal: Negative.    Musculoskeletal: Negative.    Psychiatric/Behavioral: Negative.        Objective   Vitals:    06/20/19 1239   BP: (!) 184/80   BP Location: Right arm   Patient Position: Sitting   Cuff Size: Adult   Pulse: 67    Temp: 98.5 °F (36.9 °C)   TempSrc: Oral   SpO2: 98%   Weight: 48.4 kg (106 lb 9.6 oz)     Body mass index is 19.5 kg/m².  Physical Exam   Constitutional: She appears well-developed and well-nourished.   HENT:   Head: Normocephalic and atraumatic.   Eyes: Conjunctivae are normal. No scleral icterus.   Neck: Normal range of motion. Neck supple.   Cardiovascular: Normal rate and regular rhythm.   Musculoskeletal: Normal range of motion. She exhibits no edema.   Psychiatric: She has a normal mood and affect. Her behavior is normal. Judgment and thought content normal.   Nursing note and vitals reviewed.    Reviewed Data:  No visits with results within 1 Month(s) from this visit.   Latest known visit with results is:   Office Visit on 01/22/2018   Component Date Value Ref Range Status   • Glucose 01/22/2018 93  65 - 99 mg/dL Final   • BUN 01/22/2018 16  8 - 23 mg/dL Final   • Creatinine 01/22/2018 0.82  0.57 - 1.00 mg/dL Final   • eGFR Non  Am 01/22/2018 67  >60 mL/min/1.73 Final    Comment: The MDRD GFR formula is only valid for adults with stable  renal function between ages 18 and 70.     • eGFR  Am 01/22/2018 81  >60 mL/min/1.73 Final   • BUN/Creatinine Ratio 01/22/2018 19.5  7.0 - 25.0 Final   • Sodium 01/22/2018 141  136 - 145 mmol/L Final   • Potassium 01/22/2018 4.1  3.5 - 5.2 mmol/L Final   • Chloride 01/22/2018 98  98 - 107 mmol/L Final   • Total CO2 01/22/2018 30.6* 22.0 - 29.0 mmol/L Final   • Calcium 01/22/2018 9.6  8.6 - 10.5 mg/dL Final

## 2019-06-21 LAB
BUN SERPL-MCNC: 13 MG/DL (ref 8–23)
BUN/CREAT SERPL: 16.5 (ref 7–25)
CALCIUM SERPL-MCNC: 9.6 MG/DL (ref 8.6–10.5)
CHLORIDE SERPL-SCNC: 101 MMOL/L (ref 98–107)
CO2 SERPL-SCNC: 32 MMOL/L (ref 22–29)
CREAT SERPL-MCNC: 0.79 MG/DL (ref 0.57–1)
GLUCOSE SERPL-MCNC: 155 MG/DL (ref 65–99)
POTASSIUM SERPL-SCNC: 4.4 MMOL/L (ref 3.5–5.2)
SODIUM SERPL-SCNC: 142 MMOL/L (ref 136–145)

## 2019-07-09 ENCOUNTER — TELEPHONE (OUTPATIENT)
Dept: INTERNAL MEDICINE | Facility: CLINIC | Age: 84
End: 2019-07-09

## 2019-07-09 NOTE — TELEPHONE ENCOUNTER
Patient notified and expressed understanding.  Patient stated that she would rather see Dr. Del Angel for an appointment to discuss this.  Appointment scheduled for Friday per patient request.

## 2019-07-09 NOTE — TELEPHONE ENCOUNTER
Nimisha with [facility] called stating that patient asked her to call to let Dr. Del Angel know that she has been having trouble sleeping at night and has had increased thirst since taking the losartan.  She wanted to know if she could go back to taking lisinopril.  Please advise.

## 2019-07-12 ENCOUNTER — OFFICE VISIT (OUTPATIENT)
Dept: INTERNAL MEDICINE | Facility: CLINIC | Age: 84
End: 2019-07-12

## 2019-07-12 VITALS
SYSTOLIC BLOOD PRESSURE: 188 MMHG | OXYGEN SATURATION: 96 % | TEMPERATURE: 98.7 F | BODY MASS INDEX: 19.64 KG/M2 | HEART RATE: 80 BPM | DIASTOLIC BLOOD PRESSURE: 84 MMHG | WEIGHT: 107.4 LBS

## 2019-07-12 DIAGNOSIS — Z95.0 PRESENCE OF PERMANENT CARDIAC PACEMAKER: ICD-10-CM

## 2019-07-12 DIAGNOSIS — I50.22 CHRONIC SYSTOLIC CONGESTIVE HEART FAILURE, NYHA CLASS 3 (HCC): Primary | ICD-10-CM

## 2019-07-12 DIAGNOSIS — I10 ESSENTIAL HYPERTENSION: ICD-10-CM

## 2019-07-12 PROCEDURE — 99213 OFFICE O/P EST LOW 20 MIN: CPT | Performed by: FAMILY MEDICINE

## 2019-07-12 RX ORDER — LISINOPRIL 5 MG/1
10 TABLET ORAL DAILY
Qty: 30 TABLET | Refills: 6 | Status: SHIPPED | OUTPATIENT
Start: 2019-07-12 | End: 2019-12-03

## 2019-07-12 NOTE — PROGRESS NOTES
Savannah Andres is a 83 y.o. female.      Assessment/Plan   Problem List Items Addressed This Visit        Cardiovascular and Mediastinum    CHF (congestive heart failure), NYHA class III (CMS/[facility]) - Primary    Hypertension    Relevant Medications    lisinopril (PRINIVIL,ZESTRIL) 5 MG tablet    Presence of permanent cardiac pacemaker           Monitor blood pressure goal is less than 160/90 she will otherwise follow-up as needed or  Return in about 6 months (around 1/12/2020), or if symptoms worsen or fail to improve, for Recheck, Next scheduled follow up.      Chief Complaint   Patient presents with   • would like to discuss blood pressure medication     Social History     Tobacco Use   • Smoking status: Never Smoker   • Smokeless tobacco: Never Used   • Tobacco comment: caffeine use   Substance Use Topics   • Alcohol use: No   • Drug use: No       History of Present Illness   Patient follows up for chronic problems of hypertension CHF cardiac pacemaker who does not like the side effects that she is been experiencing of dryness of mouth with change in the medicine from losartan lisinopril to losartan her blood pressure is been poorly controlled and she is aware of that she says it has always been she like to go back to Lasix lisinopril and metoprolol we discussed the potential risks of poorly controlled blood pressure and she is aware including stroke and death  The following portions of the patient's history were reviewed and updated as appropriate:PMHroutine: Social history , Allergies, Current Medications, Active Problem List and Health Maintenance    Review of Systems   Constitutional: Negative.    HENT: Negative.    Respiratory: Negative.    Cardiovascular: Negative.    Gastrointestinal: Negative.    Neurological: Negative.        Objective   Vitals:    07/12/19 1435   BP: (!) 188/84   BP Location: Right arm   Patient Position: Sitting   Cuff Size: Adult   Pulse: 80   Temp: 98.7 °F (37.1 °C)   TempSrc: Oral    SpO2: 96%   Weight: 48.7 kg (107 lb 6.4 oz)     Body mass index is 19.64 kg/m².  Physical Exam   Constitutional: She is oriented to person, place, and time. She appears well-developed and well-nourished. No distress.   HENT:   Head: Normocephalic and atraumatic.   Eyes: Conjunctivae and EOM are normal. Pupils are equal, round, and reactive to light. Right eye exhibits no discharge. Left eye exhibits no discharge. No scleral icterus.   Neck: Normal range of motion. Neck supple. No tracheal deviation present. No thyromegaly present.   Cardiovascular: Normal rate, regular rhythm, normal heart sounds, intact distal pulses and normal pulses. Exam reveals no gallop.   No murmur heard.  Pulmonary/Chest: Effort normal and breath sounds normal. No respiratory distress. She has no wheezes. She has no rales.   Musculoskeletal: Normal range of motion.   Neurological: She is alert and oriented to person, place, and time. She exhibits normal muscle tone. Coordination normal.   Skin: Skin is warm. No rash noted. No erythema. No pallor.   Psychiatric: She has a normal mood and affect. Her behavior is normal. Judgment and thought content normal.   Nursing note and vitals reviewed.    Reviewed Data:  No visits with results within 1 Month(s) from this visit.   Latest known visit with results is:   Office Visit on 06/04/2019   Component Date Value Ref Range Status   • Glucose 06/20/2019 155* 65 - 99 mg/dL Final   • BUN 06/20/2019 13  8 - 23 mg/dL Final   • Creatinine 06/20/2019 0.79  0.57 - 1.00 mg/dL Final   • eGFR Non  Am 06/20/2019 70  >60 mL/min/1.73 Final    Comment: The MDRD GFR formula is only valid for adults with stable  renal function between ages 18 and 70.     • eGFR  Am 06/20/2019 84  >60 mL/min/1.73 Final   • BUN/Creatinine Ratio 06/20/2019 16.5  7.0 - 25.0 Final   • Sodium 06/20/2019 142  136 - 145 mmol/L Final   • Potassium 06/20/2019 4.4  3.5 - 5.2 mmol/L Final   • Chloride 06/20/2019 101  98 - 107  mmol/L Final   • Total CO2 06/20/2019 32.0* 22.0 - 29.0 mmol/L Final   • Calcium 06/20/2019 9.6  8.6 - 10.5 mg/dL Final

## 2019-08-12 RX ORDER — FUROSEMIDE 20 MG/1
TABLET ORAL
Qty: 90 TABLET | Refills: 1 | Status: SHIPPED | OUTPATIENT
Start: 2019-08-12 | End: 2020-02-17 | Stop reason: SDUPTHER

## 2019-09-11 ENCOUNTER — CLINICAL SUPPORT NO REQUIREMENTS (OUTPATIENT)
Dept: CARDIOLOGY | Facility: CLINIC | Age: 84
End: 2019-09-11

## 2019-09-11 DIAGNOSIS — I44.2 COMPLETE HEART BLOCK (HCC): Primary | ICD-10-CM

## 2019-09-11 PROCEDURE — 93296 REM INTERROG EVL PM/IDS: CPT | Performed by: INTERNAL MEDICINE

## 2019-09-11 PROCEDURE — 93294 REM INTERROG EVL PM/LDLS PM: CPT | Performed by: INTERNAL MEDICINE

## 2019-11-15 ENCOUNTER — OFFICE VISIT (OUTPATIENT)
Dept: INTERNAL MEDICINE | Facility: CLINIC | Age: 84
End: 2019-11-15

## 2019-11-15 VITALS
SYSTOLIC BLOOD PRESSURE: 170 MMHG | WEIGHT: 110.3 LBS | HEIGHT: 62 IN | DIASTOLIC BLOOD PRESSURE: 80 MMHG | TEMPERATURE: 98.1 F | OXYGEN SATURATION: 99 % | HEART RATE: 83 BPM | BODY MASS INDEX: 20.3 KG/M2

## 2019-11-15 DIAGNOSIS — H00.012 HORDEOLUM EXTERNUM OF RIGHT LOWER EYELID: Primary | ICD-10-CM

## 2019-11-15 DIAGNOSIS — I10 ESSENTIAL HYPERTENSION: ICD-10-CM

## 2019-11-15 PROCEDURE — 99213 OFFICE O/P EST LOW 20 MIN: CPT | Performed by: FAMILY MEDICINE

## 2019-11-15 RX ORDER — INFLUENZA A VIRUS A/MICHIGAN/45/2015 X-275 (H1N1) ANTIGEN (FORMALDEHYDE INACTIVATED), INFLUENZA A VIRUS A/SINGAPORE/INFIMH-16-0019/2016 IVR-186 (H3N2) ANTIGEN (FORMALDEHYDE INACTIVATED), AND INFLUENZA B VIRUS B/MARYLAND/15/2016 BX-69A (A B/COLORADO/6/2017-LIKE VIRUS) ANTIGEN (FORMALDEHYDE INACTIVATED) 60; 60; 60 UG/.5ML; UG/.5ML; UG/.5ML
INJECTION, SUSPENSION INTRAMUSCULAR
Refills: 0 | COMMUNITY
Start: 2019-10-23 | End: 2019-11-15

## 2019-11-15 RX ORDER — ERYTHROMYCIN 5 MG/G
OINTMENT OPHTHALMIC 2 TIMES DAILY
Qty: 3.5 G | Refills: 0 | Status: SHIPPED | OUTPATIENT
Start: 2019-11-15 | End: 2020-09-17

## 2019-11-15 NOTE — PROGRESS NOTES
"Savannah Andres is a 84 y.o. female.      Assessment/Plan   Problem List Items Addressed This Visit        Cardiovascular and Mediastinum    Hypertension       Other    Hordeolum externum of right lower eyelid - Primary         Lisinopril 10 mg daily furosemide 20 mg daily metoprolol 50 mg daily monitor blood pressure  Erythromycin ointment twice daily warm compress if no improvement consult ophthalmology  Return if symptoms worsen or fail to improve, for Recheck.      Chief Complaint   Patient presents with   • skin issue right eye   Hypertension  Social History     Tobacco Use   • Smoking status: Never Smoker   • Smokeless tobacco: Never Used   • Tobacco comment: caffeine use   Substance Use Topics   • Alcohol use: No   • Drug use: No       History of Present Illness   She with 2-week with 2-week history of reddened eyelid seen in urgent care recommend to have tobramycin ointment failed to  the prescription because of cost has been using warm compress change in vision no recent upper restaurant infections.  Also carries a diagnosis of hypertension she does not take her lisinopril 10 mg daily she sometimes only takes 5 she had some readings in the 150/80-90 range no chest pain shortness of breath or increased fatigue  The following portions of the patient's history were reviewed and updated as appropriate:PMHroutine: Social history , Allergies, Current Medications, Active Problem List and Health Maintenance    Review of Systems   Constitutional: Negative.    Respiratory: Negative.    Allergic/Immunologic: Negative.        Objective   Vitals:    11/15/19 1315   BP: 170/80   BP Location: Left arm   Patient Position: Sitting   Cuff Size: Adult   Pulse: 83   Temp: 98.1 °F (36.7 °C)   TempSrc: Oral   SpO2: 99%   Weight: 50 kg (110 lb 4.8 oz)   Height: 157.5 cm (62\")     Body mass index is 20.17 kg/m².  Physical Exam   Constitutional: She appears well-developed and well-nourished.   Eyes: Right eye exhibits " discharge.   Erythematous lacrimal duct lower lid   Cardiovascular: Normal rate and regular rhythm.   Nursing note and vitals reviewed.    Reviewed Data:  No visits with results within 1 Month(s) from this visit.   Latest known visit with results is:   Office Visit on 06/04/2019   Component Date Value Ref Range Status   • Glucose 06/20/2019 155* 65 - 99 mg/dL Final   • BUN 06/20/2019 13  8 - 23 mg/dL Final   • Creatinine 06/20/2019 0.79  0.57 - 1.00 mg/dL Final   • eGFR Non  Am 06/20/2019 70  >60 mL/min/1.73 Final    Comment: The MDRD GFR formula is only valid for adults with stable  renal function between ages 18 and 70.     • eGFR  Am 06/20/2019 84  >60 mL/min/1.73 Final   • BUN/Creatinine Ratio 06/20/2019 16.5  7.0 - 25.0 Final   • Sodium 06/20/2019 142  136 - 145 mmol/L Final   • Potassium 06/20/2019 4.4  3.5 - 5.2 mmol/L Final   • Chloride 06/20/2019 101  98 - 107 mmol/L Final   • Total CO2 06/20/2019 32.0* 22.0 - 29.0 mmol/L Final   • Calcium 06/20/2019 9.6  8.6 - 10.5 mg/dL Final

## 2019-11-25 ENCOUNTER — TELEPHONE (OUTPATIENT)
Dept: INTERNAL MEDICINE | Facility: CLINIC | Age: 84
End: 2019-11-25

## 2019-11-25 NOTE — TELEPHONE ENCOUNTER
Nimisha ([phone]) with [facility] called stating that patient's eye looks a lot better but it is still red and raised.  She wanted to know if patient should continue using the medication or see Dr. Del Angel for an appointment.  Please advise.

## 2019-12-03 ENCOUNTER — CLINICAL SUPPORT NO REQUIREMENTS (OUTPATIENT)
Dept: CARDIOLOGY | Facility: CLINIC | Age: 84
End: 2019-12-03

## 2019-12-03 ENCOUNTER — OFFICE VISIT (OUTPATIENT)
Dept: CARDIOLOGY | Facility: CLINIC | Age: 84
End: 2019-12-03

## 2019-12-03 VITALS
DIASTOLIC BLOOD PRESSURE: 80 MMHG | SYSTOLIC BLOOD PRESSURE: 142 MMHG | WEIGHT: 105 LBS | HEIGHT: 62 IN | HEART RATE: 77 BPM | BODY MASS INDEX: 19.32 KG/M2

## 2019-12-03 DIAGNOSIS — I27.20 PULMONARY HYPERTENSION (HCC): ICD-10-CM

## 2019-12-03 DIAGNOSIS — I42.9 CARDIOMYOPATHY, UNSPECIFIED TYPE (HCC): ICD-10-CM

## 2019-12-03 DIAGNOSIS — I44.2 THIRD DEGREE HEART BLOCK (HCC): ICD-10-CM

## 2019-12-03 DIAGNOSIS — I44.2 COMPLETE HEART BLOCK (HCC): Primary | ICD-10-CM

## 2019-12-03 DIAGNOSIS — I10 ESSENTIAL HYPERTENSION: ICD-10-CM

## 2019-12-03 DIAGNOSIS — Z95.0 PRESENCE OF PERMANENT CARDIAC PACEMAKER: Primary | ICD-10-CM

## 2019-12-03 PROCEDURE — 93000 ELECTROCARDIOGRAM COMPLETE: CPT | Performed by: INTERNAL MEDICINE

## 2019-12-03 PROCEDURE — 93280 PM DEVICE PROGR EVAL DUAL: CPT | Performed by: INTERNAL MEDICINE

## 2019-12-03 PROCEDURE — 99214 OFFICE O/P EST MOD 30 MIN: CPT | Performed by: INTERNAL MEDICINE

## 2019-12-03 RX ORDER — LISINOPRIL 10 MG/1
10 TABLET ORAL DAILY
Start: 2019-12-03 | End: 2020-06-09 | Stop reason: SDUPTHER

## 2019-12-03 NOTE — PROGRESS NOTES
Subjective:     Encounter Date:12/03/2019      Patient ID: Savannah Andres is a 84 y.o. female.    Chief Complaint:  History of Present Illness    This is an 84-year-old with a history of complete heart block status post dual-chamber permanent pacemaker placement on 11/2016, hypertension, cardiomyopathy with an EF of 38%, who presents for follow-up.    Patient was previously followed by Dr. Angel who she last saw the office in 12/2018.  At that time she was doing well and no changes were made to her management.  She was last seen in the office by MERYL Miranda in 6/2019 for routine follow-up.  At that time she was noted to have elevated blood pressures and it was recommended by both Dr. Del Angel and Liliana that she increase her lisinopril to 10 mg a day.  At that office visit she also reported increased thirst especially in the evening that she reported at that time was related to furosemide use.  At that office visit it was reported by both the patient and Dr. Angel that she had had a repeat echocardiogram since 2016 that showed normalization of her left ventricular function although a copy of that echo report is not available for review.    Today she presents for routine follow-up.  She reports that she tried increasing the lisinopril to 10 mg for about 2 or 3 days but backed off on back to the 5 mg a day.  She claims that this was because she felt like it was causing issues with increased thirst.  She claims that that increased thirst improved when she backed off on the lisinopril.  She also admits that she was worried that the increase in lisinopril but resulted in issues with lightheadedness and worsening unsteadiness which could result in falls.  Is unclear if she experience any of those issues while taking the higher dose of lisinopril.  She has her blood pressures checked about once a week at the [facility] where she lives and she brings in a blood pressure log that shows that her systolic pressures remain largely  in the 140s to 160s.  She otherwise has been compliant with the metoprolol and furosemide on a daily basis.  She denies any chest pain, palpitations, PND orthopnea, near-syncope or syncope, or lower extremity edema.  She did have a device interrogation performed in the office today that showed that she was atrial paced 62% of the time and ventricular paced 100% of the time with less than 0.1% atrial arrhythmia burden.    Review of Systems   Constitution: Positive for malaise/fatigue. Negative for weakness.   HENT: Negative for hearing loss, hoarse voice, nosebleeds and sore throat.    Eyes: Negative for pain.   Cardiovascular: Negative for chest pain, claudication, cyanosis, dyspnea on exertion, irregular heartbeat, leg swelling, near-syncope, orthopnea, palpitations, paroxysmal nocturnal dyspnea and syncope.   Respiratory: Negative for shortness of breath and snoring.    Endocrine: Negative for cold intolerance, heat intolerance, polydipsia, polyphagia and polyuria.   Skin: Negative for itching and rash.   Musculoskeletal: Negative for arthritis, falls, joint pain, joint swelling, muscle cramps, muscle weakness and myalgias.   Gastrointestinal: Negative for constipation, diarrhea, dysphagia, heartburn, hematemesis, hematochezia, melena, nausea and vomiting.   Genitourinary: Negative for frequency, hematuria and hesitancy.   Neurological: Positive for light-headedness. Negative for excessive daytime sleepiness, dizziness, headaches and numbness.   Psychiatric/Behavioral: Negative for depression. The patient is not nervous/anxious.          Current Outpatient Medications:   •  erythromycin (ROMYCIN) 5 MG/GM ophthalmic ointment, Administer  to the right eye 2 (Two) Times a Day., Disp: 3.5 g, Rfl: 0  •  furosemide (LASIX) 20 MG tablet, TAKE ONE TABLET BY MOUTH DAILY, Disp: 90 tablet, Rfl: 1  •  lisinopril (PRINIVIL,ZESTRIL) 5 MG tablet, Take 2 tablets by mouth Daily. (Patient taking differently: Take 5 mg by mouth  "Daily.), Disp: 30 tablet, Rfl: 6  •  metoprolol succinate XL (TOPROL-XL) 50 MG 24 hr tablet, TAKE ONE TABLET BY MOUTH DAILY, Disp: 90 tablet, Rfl: 3    Past Medical History:   Diagnosis Date   • Bilateral leg edema    • CHB (complete heart block) (CMS/HCC)     3rd degree   • CHF (congestive heart failure) (CMS/HCC)    • Hypertension    • Loss of consciousness (CMS/HCC)    • Skin cyst    • SOB (shortness of breath)    • Symptomatic bradycardia    • Syncope        Past Surgical History:   Procedure Laterality Date   • CARDIAC ELECTROPHYSIOLOGY PROCEDURE N/A 11/23/2016    Procedure: Pacemaker DC new  [employer];  Surgeon: Ananth Malcolm MD;  Location: [facility] INVASIVE LOCATION;  Service:    • PACEMAKER IMPLANTATION         Family History   Problem Relation Age of Onset   • Hypertension Mother    • Hypertension Father        Social History     Tobacco Use   • Smoking status: Never Smoker   • Smokeless tobacco: Never Used   • Tobacco comment: caffeine use   Substance Use Topics   • Alcohol use: No   • Drug use: No         ECG 12 Lead  Date/Time: 12/3/2019 3:48 PM  Performed by: Alia Dow MD  Authorized by: Alia Dow MD   Comparison: compared with previous ECG   Comments: Atrial-ventricular paced rhythm               Objective:     Visit Vitals  /80   Pulse 77   Ht 157.5 cm (62\")   Wt 47.6 kg (105 lb)   BMI 19.20 kg/m²         Physical Exam   Constitutional: She is oriented to person, place, and time. She appears well-developed and well-nourished.   HENT:   Head: Normocephalic and atraumatic.   Eyes: Conjunctivae, EOM and lids are normal. Pupils are equal, round, and reactive to light.   Neck: Normal range of motion and full passive range of motion without pain. Neck supple. No JVD present. Carotid bruit is not present.   Cardiovascular: Normal rate, regular rhythm, S1 normal and S2 normal. Exam reveals no gallop.   No murmur heard.  Pulses:       Radial pulses are 2+ on the right side, and 2+ on " the left side.   No bilateral lower extremity edema   Pulmonary/Chest: Effort normal and breath sounds normal.   Abdominal: Soft. Normal appearance.   Lymphadenopathy:     She has no cervical adenopathy.   Neurological: She is alert and oriented to person, place, and time.   Skin: Skin is warm, dry and intact.   Psychiatric: She has a normal mood and affect.       Lab Review:       Assessment:          Diagnosis Plan   1. Presence of permanent cardiac pacemaker     2. Third degree heart block (CMS/HCC)     3. Essential hypertension     4. Cardiomyopathy, unspecified type (CMS/HCC)     5. Pulmonary hypertension (CMS/HCC)            Plan:       1.  Hypertension.  Does not appear quite as elevated as she has in the past but I agree that it would be best if she increased her lisinopril to 10 mg.  I explained to the patient that I am not sure that the lisinopril was responsible for her increased thirst since she reported the symptoms even before increasing her lisinopril dosage.  The patient is willing to try increasing lisinopril to 10 mg again.  I asked her to give it at least 2 weeks to see how she does with the higher dose.  2.  Complete heart block status post dual-chamber permanent pacemaker placement.  Routine device check in the office today shows normal function.  3.  History of cardiomyopathy.  EF noted to be around 38% in 2016.  Reportedly she has had a repeat echocardiogram since then that showed normalization of function although there is no available report of the repeat echocardiogram.  Regardless she does not exhibit any signs or symptoms of volume overload.  4.  History of pulmonary hypertension.  Related to cardiomyopathy history.    We will plan on seeing the patient back again in 3 months.

## 2019-12-05 ENCOUNTER — TELEPHONE (OUTPATIENT)
Dept: INTERNAL MEDICINE | Facility: CLINIC | Age: 84
End: 2019-12-05

## 2019-12-05 NOTE — TELEPHONE ENCOUNTER
Patient called stating that she is still having trouble with her stye.  She wanted to know if Dr. Del Angel could refer her to an ophthalmologist.  Please advise.

## 2019-12-06 NOTE — TELEPHONE ENCOUNTER
Patient notified and expressed understanding.  She was given Dr. Thompson [phone]) - patient to call and schedule her own appointment.

## 2020-02-06 ENCOUNTER — HOSPITAL ENCOUNTER (EMERGENCY)
Facility: HOSPITAL | Age: 85
Discharge: HOME | End: 2020-02-06
Attending: EMERGENCY MEDICINE | Admitting: EMERGENCY MEDICINE

## 2020-02-06 ENCOUNTER — APPOINTMENT (OUTPATIENT)
Dept: CT IMAGING | Facility: HOSPITAL | Age: 85
End: 2020-02-06

## 2020-02-06 VITALS
DIASTOLIC BLOOD PRESSURE: 90 MMHG | WEIGHT: 107 LBS | HEIGHT: 63 IN | OXYGEN SATURATION: 94 % | BODY MASS INDEX: 18.96 KG/M2 | RESPIRATION RATE: 16 BRPM | SYSTOLIC BLOOD PRESSURE: 163 MMHG | TEMPERATURE: 98.9 F | HEART RATE: 75 BPM

## 2020-02-06 DIAGNOSIS — R41.0 EPISODE OF CONFUSION: Primary | ICD-10-CM

## 2020-02-06 LAB
ALBUMIN SERPL-MCNC: 4.2 G/DL (ref 3.5–5.2)
ALBUMIN/GLOB SERPL: 1.2 G/DL
ALP SERPL-CCNC: 82 U/L (ref 39–117)
ALT SERPL W P-5'-P-CCNC: 20 U/L (ref 1–33)
ANION GAP SERPL CALCULATED.3IONS-SCNC: 13.4 MMOL/L (ref 5–15)
AST SERPL-CCNC: 21 U/L (ref 1–32)
BASOPHILS # BLD AUTO: 0.04 10*3/MM3 (ref 0–0.2)
BASOPHILS NFR BLD AUTO: 0.6 % (ref 0–1.5)
BILIRUB SERPL-MCNC: 0.3 MG/DL (ref 0.2–1.2)
BUN BLD-MCNC: 15 MG/DL (ref 8–23)
BUN/CREAT SERPL: 16.9 (ref 7–25)
CALCIUM SPEC-SCNC: 9.3 MG/DL (ref 8.6–10.5)
CHLORIDE SERPL-SCNC: 97 MMOL/L (ref 98–107)
CO2 SERPL-SCNC: 28.6 MMOL/L (ref 22–29)
CREAT BLD-MCNC: 0.89 MG/DL (ref 0.57–1)
DEPRECATED RDW RBC AUTO: 39.3 FL (ref 37–54)
EOSINOPHIL # BLD AUTO: 0.11 10*3/MM3 (ref 0–0.4)
EOSINOPHIL NFR BLD AUTO: 1.5 % (ref 0.3–6.2)
ERYTHROCYTE [DISTWIDTH] IN BLOOD BY AUTOMATED COUNT: 12.3 % (ref 12.3–15.4)
GFR SERPL CREATININE-BSD FRML MDRD: 60 ML/MIN/1.73
GLOBULIN UR ELPH-MCNC: 3.5 GM/DL
GLUCOSE BLD-MCNC: 154 MG/DL (ref 65–99)
HCT VFR BLD AUTO: 40.5 % (ref 34–46.6)
HGB BLD-MCNC: 13.6 G/DL (ref 12–15.9)
HOLD SPECIMEN: NORMAL
HOLD SPECIMEN: NORMAL
IMM GRANULOCYTES # BLD AUTO: 0.02 10*3/MM3 (ref 0–0.05)
IMM GRANULOCYTES NFR BLD AUTO: 0.3 % (ref 0–0.5)
LYMPHOCYTES # BLD AUTO: 2.02 10*3/MM3 (ref 0.7–3.1)
LYMPHOCYTES NFR BLD AUTO: 28.3 % (ref 19.6–45.3)
MCH RBC QN AUTO: 29.7 PG (ref 26.6–33)
MCHC RBC AUTO-ENTMCNC: 33.6 G/DL (ref 31.5–35.7)
MCV RBC AUTO: 88.4 FL (ref 79–97)
MONOCYTES # BLD AUTO: 0.43 10*3/MM3 (ref 0.1–0.9)
MONOCYTES NFR BLD AUTO: 6 % (ref 5–12)
NEUTROPHILS # BLD AUTO: 4.52 10*3/MM3 (ref 1.7–7)
NEUTROPHILS NFR BLD AUTO: 63.3 % (ref 42.7–76)
NRBC BLD AUTO-RTO: 0 /100 WBC (ref 0–0.2)
PLATELET # BLD AUTO: 284 10*3/MM3 (ref 140–450)
PMV BLD AUTO: 9.2 FL (ref 6–12)
POTASSIUM BLD-SCNC: 3.3 MMOL/L (ref 3.5–5.2)
PROT SERPL-MCNC: 7.7 G/DL (ref 6–8.5)
RBC # BLD AUTO: 4.58 10*6/MM3 (ref 3.77–5.28)
SODIUM BLD-SCNC: 139 MMOL/L (ref 136–145)
WBC NRBC COR # BLD: 7.14 10*3/MM3 (ref 3.4–10.8)
WHOLE BLOOD HOLD SPECIMEN: NORMAL
WHOLE BLOOD HOLD SPECIMEN: NORMAL

## 2020-02-06 PROCEDURE — 80053 COMPREHEN METABOLIC PANEL: CPT | Performed by: EMERGENCY MEDICINE

## 2020-02-06 PROCEDURE — 85025 COMPLETE CBC W/AUTO DIFF WBC: CPT | Performed by: EMERGENCY MEDICINE

## 2020-02-06 PROCEDURE — 99284 EMERGENCY DEPT VISIT MOD MDM: CPT

## 2020-02-06 NOTE — ED NOTES
CT tech states pt refused CT d/t told her she was unable to lay flat for test. States she has had dizziness while lying flat  Since pacemaker was placed. Dr marquez notified. Daughter at bedside and confirms.      Nimisha Bagley RN  02/06/20 [id number]

## 2020-02-06 NOTE — DISCHARGE INSTRUCTIONS
Follow-up with your primary care doctor if symptoms persist.  Return to the emergency department for worsening symptoms or other concern.

## 2020-02-06 NOTE — ED PROVIDER NOTES
" EMERGENCY DEPARTMENT ENCOUNTER    CHIEF COMPLAINT  Chief Complaint: Confusion  History given by: patient  History limited by: nothing  Room Number: 31/31  PMD: Vasu Del Angel MD      HPI:  Pt is a 84 y.o. female who presents complaining of increased confusion that was noticed this afternoon around 1500 by nursing home staff. Pt denies cough, fever, chills, CP, SOA, diarrhea, nausea, vomiting, HA and difficulty urinating. The pt is a resident at an assisted living facility where she was reportedly seen by nursing staff to be walking down a hallway, more confused than normal. The pt states she woke up this morning feeling normal and after taking an hour long nap this afternoon, woke up feeling confused. She reports feeling as if she \"didn't know where she was or what she was supposed to be doing\". After nursing staff found the pt, she was brought to the ED for further evaluation. Pt reports feeling back to baseline at this time. Hx of CHF, HTN, CHB and syncope.     Duration:  This afternoon around 1500  Onset: gradual  Timing: one episode  Location: neurological  Radiation: n/a  Quality: confusion  Intensity/Severity: moderate  Progression: improved  Associated Symptoms: none stated  Aggravating Factors: none stated  Alleviating Factors: none stated  Previous Episodes: none stated  Treatment before arrival: none stated    PAST MEDICAL HISTORY  Active Ambulatory Problems     Diagnosis Date Noted   • Third degree heart block (CMS/HCC) 11/22/2016   • CHF (congestive heart failure), NYHA class III (CMS/HCC) 11/30/2016   • Hyperglycemia 12/12/2016   • Hypokalemia 12/12/2016   • Acute midline low back pain without sciatica 01/22/2018   • Inflamed sebaceous cyst 08/30/2018   • Hypertension 06/04/2019   • Presence of permanent cardiac pacemaker 06/04/2019   • Valvular heart disease 06/04/2019   • Pulmonary hypertension (CMS/HCC) 06/20/2019   • Hordeolum externum of right lower eyelid 11/15/2019   • Cardiomyopathy (CMS/HCC) " 12/03/2019     Resolved Ambulatory Problems     Diagnosis Date Noted   • Dysuria 01/16/2017   • Acute cystitis with hematuria 01/16/2017   • Viral enteritis 02/14/2017     Past Medical History:   Diagnosis Date   • Bilateral leg edema    • CHB (complete heart block) (CMS/HCC)    • CHF (congestive heart failure) (CMS/HCC)    • Loss of consciousness (CMS/HCC)    • Skin cyst    • SOB (shortness of breath)    • Symptomatic bradycardia    • Syncope        PAST SURGICAL HISTORY  Past Surgical History:   Procedure Laterality Date   • CARDIAC ELECTROPHYSIOLOGY PROCEDURE N/A 11/23/2016    Procedure: Pacemaker DC new  [employer];  Surgeon: Ananth Malcolm MD;  Location: [facility] INVASIVE LOCATION;  Service:    • PACEMAKER IMPLANTATION         FAMILY HISTORY  Family History   Problem Relation Age of Onset   • Hypertension Mother    • Hypertension Father        SOCIAL HISTORY  Social History     Socioeconomic History   • Marital status: Single     Spouse name: Not on file   • Number of children: 3   • Years of education: Not on file   • Highest education level: Not on file   Occupational History   • Occupation: unknown   Tobacco Use   • Smoking status: Never Smoker   • Smokeless tobacco: Never Used   • Tobacco comment: caffeine use   Substance and Sexual Activity   • Alcohol use: No   • Drug use: No   • Sexual activity: Defer       ALLERGIES  Patient has no known allergies.    REVIEW OF SYSTEMS  Review of Systems   Constitutional: Negative for fever.   HENT: Negative for sore throat.    Respiratory: Negative for cough and shortness of breath.    Cardiovascular: Negative for chest pain.   Gastrointestinal: Negative for abdominal pain, diarrhea and vomiting.   Genitourinary: Negative for dysuria.   Musculoskeletal: Negative for neck pain.   Skin: Negative for rash.   Neurological: Negative for weakness, numbness and headaches.   Psychiatric/Behavioral: Positive for confusion.   All other systems reviewed and are  negative.      PHYSICAL EXAM  ED Triage Vitals [02/06/20 1608]   Temp Heart Rate Resp BP SpO2   98.9 °F (37.2 °C) 78 -- (!) 191/118 99 %      Temp src Heart Rate Source Patient Position BP Location FiO2 (%)   Tympanic -- -- -- --       Physical Exam   Constitutional: She is oriented to person, place, and time. No distress.   HENT:   Head: Normocephalic and atraumatic.   Eyes: Pupils are equal, round, and reactive to light. EOM are normal.   Neck: Normal range of motion. Neck supple.   Cardiovascular: Normal rate, regular rhythm and normal heart sounds.   Pace maker to the upper L chest wall   Pulmonary/Chest: Effort normal and breath sounds normal. No respiratory distress.   Abdominal: Soft. There is no tenderness. There is no rebound and no guarding.   Musculoskeletal: Normal range of motion. She exhibits no edema.   Neurological: She is alert and oriented to person, place, and time. She has normal sensation and normal strength. She displays facial symmetry and normal speech. She has a normal Finger-Nose-Finger Test and a normal Heel to Squires Test.   No dysarthria or aphasia    Skin: Skin is warm and dry. No rash noted.   Psychiatric: Mood and affect normal.   Nursing note and vitals reviewed.      LAB RESULTS  Lab Results (last 24 hours)     Procedure Component Value Units Date/Time    CBC & Differential [[id number]] Collected:  02/06/20 1634    Specimen:  Blood Updated:  02/06/20 1642    Narrative:       The following orders were created for panel order CBC & Differential.  Procedure                               Abnormality         Status                     ---------                               -----------         ------                     CBC Auto Differential[[id number]]        Normal              Final result                 Please view results for these tests on the individual orders.    Comprehensive Metabolic Panel [[id number]]  (Abnormal) Collected:  02/06/20 1634    Specimen:  Blood Updated:  02/06/20  1709     Glucose 154 mg/dL      BUN 15 mg/dL      Creatinine 0.89 mg/dL      Sodium 139 mmol/L      Potassium 3.3 mmol/L      Chloride 97 mmol/L      CO2 28.6 mmol/L      Calcium 9.3 mg/dL      Total Protein 7.7 g/dL      Albumin 4.20 g/dL      ALT (SGPT) 20 U/L      AST (SGOT) 21 U/L      Alkaline Phosphatase 82 U/L      Total Bilirubin 0.3 mg/dL      eGFR Non African Amer 60 mL/min/1.73      Globulin 3.5 gm/dL      A/G Ratio 1.2 g/dL      BUN/Creatinine Ratio 16.9     Anion Gap 13.4 mmol/L     Narrative:       GFR Normal >60  Chronic Kidney Disease <60  Kidney Failure <15      CBC Auto Differential [[id number]]  (Normal) Collected:  02/06/20 1634    Specimen:  Blood Updated:  02/06/20 1642     WBC 7.14 10*3/mm3      RBC 4.58 10*6/mm3      Hemoglobin 13.6 g/dL      Hematocrit 40.5 %      MCV 88.4 fL      MCH 29.7 pg      MCHC 33.6 g/dL      RDW 12.3 %      RDW-SD 39.3 fl      MPV 9.2 fL      Platelets 284 10*3/mm3      Neutrophil % 63.3 %      Lymphocyte % 28.3 %      Monocyte % 6.0 %      Eosinophil % 1.5 %      Basophil % 0.6 %      Immature Grans % 0.3 %      Neutrophils, Absolute 4.52 10*3/mm3      Lymphocytes, Absolute 2.02 10*3/mm3      Monocytes, Absolute 0.43 10*3/mm3      Eosinophils, Absolute 0.11 10*3/mm3      Basophils, Absolute 0.04 10*3/mm3      Immature Grans, Absolute 0.02 10*3/mm3      nRBC 0.0 /100 WBC           I ordered the above labs and reviewed the results    RADIOLOGY  No orders to display        I ordered the above noted radiological studies. Interpreted by radiologist. Reviewed by me in PACS.         PROGRESS AND CONSULTS  ED Course as of Feb 06 1848   Thu Feb 06, 2020   1619 Old records reviewed.  Patient was last admitted here November 2016 for heart failure and heart block.  Permanent pacemaker was placed.    [WH]   1818 Patient could not lay flat for head CT.  She was also unable to provide a urine specimen..  Patient is resting comfortably.  She remains awake and alert.  Her daughter  is at bedside and confirms that the patient is at her mental status baseline.  Patient wants to be discharged.  She denies any dysuria, increased frequency, or urgency.  Patient's neuro exam was nonfocal.  Symptoms were not suggestive of stroke or TIA.  Doubt the patient has a UTI as she is asymptomatic.  She was initially mildly hypertensive but her blood pressure improved without intervention.  She will be discharged and advised to follow-up with her primary care doctor.    [WH]      ED Course User Index  [WH] Raudel Chris MD     2892 CT Head without contrast ordered. UA ordered. CMP ordered.     MEDICAL DECISION MAKING  Results were reviewed/discussed with the patient and they were also made aware of online access. Pt also made aware that some labs, such as cultures, will not be resulted during ER visit and follow up with PMD is necessary.     MDM  Number of Diagnoses or Management Options     Amount and/or Complexity of Data Reviewed  Clinical lab tests: ordered and reviewed (WBC 7.14  Potassium 3.3  Glucose 154)  Tests in the radiology section of CPT®: ordered and reviewed (CT Head )  Decide to obtain previous medical records or to obtain history from someone other than the patient: yes  Review and summarize past medical records: yes (See ED Course dictation note  )  Independent visualization of images, tracings, or specimens: yes           DIAGNOSIS  Final diagnoses:   Episode of confusion       DISPOSITION  DISCHARGE    Patient discharged in stable condition.    Reviewed implications of results, diagnosis, meds, responsibility to follow up, warning signs and symptoms of possible worsening, potential complications and reasons to return to ER.    Patient/Family voiced understanding of above instructions.    Discussed plan for discharge, as there is no emergent indication for admission. Patient referred to primary care provider for BP management due to today's BP. Pt/family is agreeable and understands  need for follow up and repeat testing.  Pt is aware that discharge does not mean that nothing is wrong but it indicates no emergency is present that requires admission and they must continue care with follow-up as given below or physician of their choice.     FOLLOW-UP  Vasu Del Angel MD  [address] [facility] [address]  [place] [id number]  [phone]    Schedule an appointment as soon as possible for a visit   If symptoms persist         Medication List      No changes were made to your prescriptions during this visit.         Latest Documented Vital Signs:  As of 6:48 PM  BP- 163/90 HR- 75 Temp- 98.9 °F (37.2 °C) (Tympanic) O2 sat- 94%    --  Documentation assistance provided by bao Youssef for Dr. Raudel Chris.  Information recorded by the scribe was done at my direction and has been verified and validated by me.       Mikael Page  02/06/20 9716       Raudel Chris MD  02/06/20 2286

## 2020-02-06 NOTE — ED NOTES
Pt attempted to give clean catch specimen, Palma PRICE was in bathroom to assist. States UA cup fell in toilet.      Nimisha Bagley RN  02/06/20 5415

## 2020-02-06 NOTE — ED NOTES
"Patient to er from assisted living per ems. Reported patient was found by staff walking the heller and seemed a little confused. Staff reported this is not like her.\"      Mateo Read RN  02/06/20 1593    "

## 2020-02-06 NOTE — ED NOTES
Pt ambulated to bathroom independently with steady gait      Nimisha Bagley, LEATHA  02/06/20 2245       Nimisha Bagley RN  02/06/20 5619

## 2020-02-07 NOTE — PROGRESS NOTES
Discharge Planning Assessment  [facility]     Patient Name: Savannah Andres  MRN: [id number]  Today's Date: 2/6/2020    Admit Date: 2/6/2020    Discharge Needs Assessment    No documentation.       Discharge Plan     Row Name 02/06/20 1913       Plan    Plan Comments  [facility]. Daughter, Savannah Andres, to transport pt by PV. Family states that pt does not need any transitional visits or home health.        Destination      Coordination has not been started for this encounter.      Durable Medical Equipment      Coordination has not been started for this encounter.      Dialysis/Infusion      Coordination has not been started for this encounter.      Home Medical Care      Coordination has not been started for this encounter.      Therapy      Coordination has not been started for this encounter.      Community Resources      Coordination has not been started for this encounter.          Demographic Summary    No documentation.       Functional Status    No documentation.       Psychosocial    No documentation.       Abuse/Neglect    No documentation.       Legal    No documentation.       Substance Abuse    No documentation.       Patient Forms    No documentation.           Vanessa Barker RN

## 2020-02-17 RX ORDER — FUROSEMIDE 20 MG/1
20 TABLET ORAL DAILY
Qty: 90 TABLET | Refills: 1 | Status: SHIPPED | OUTPATIENT
Start: 2020-02-17 | End: 2020-08-10

## 2020-03-05 ENCOUNTER — OFFICE VISIT (OUTPATIENT)
Dept: CARDIOLOGY | Facility: CLINIC | Age: 85
End: 2020-03-05

## 2020-03-05 ENCOUNTER — CLINICAL SUPPORT NO REQUIREMENTS (OUTPATIENT)
Dept: CARDIOLOGY | Facility: CLINIC | Age: 85
End: 2020-03-05

## 2020-03-05 VITALS
BODY MASS INDEX: 19.14 KG/M2 | HEIGHT: 63 IN | WEIGHT: 108 LBS | SYSTOLIC BLOOD PRESSURE: 160 MMHG | DIASTOLIC BLOOD PRESSURE: 94 MMHG | HEART RATE: 63 BPM

## 2020-03-05 DIAGNOSIS — I44.2 COMPLETE HEART BLOCK (HCC): Primary | ICD-10-CM

## 2020-03-05 DIAGNOSIS — I10 ESSENTIAL HYPERTENSION: Primary | ICD-10-CM

## 2020-03-05 DIAGNOSIS — I27.20 PULMONARY HYPERTENSION (HCC): ICD-10-CM

## 2020-03-05 DIAGNOSIS — Z95.0 PRESENCE OF PERMANENT CARDIAC PACEMAKER: ICD-10-CM

## 2020-03-05 DIAGNOSIS — I44.2 THIRD DEGREE HEART BLOCK (HCC): ICD-10-CM

## 2020-03-05 DIAGNOSIS — I38 VALVULAR HEART DISEASE: ICD-10-CM

## 2020-03-05 DIAGNOSIS — I42.9 CARDIOMYOPATHY, UNSPECIFIED TYPE (HCC): ICD-10-CM

## 2020-03-05 PROCEDURE — 99214 OFFICE O/P EST MOD 30 MIN: CPT | Performed by: INTERNAL MEDICINE

## 2020-03-05 PROCEDURE — 93294 REM INTERROG EVL PM/LDLS PM: CPT | Performed by: INTERNAL MEDICINE

## 2020-03-05 PROCEDURE — 93000 ELECTROCARDIOGRAM COMPLETE: CPT | Performed by: INTERNAL MEDICINE

## 2020-03-05 PROCEDURE — 93296 REM INTERROG EVL PM/IDS: CPT | Performed by: INTERNAL MEDICINE

## 2020-03-05 NOTE — PROGRESS NOTES
Subjective:     Encounter Date:03/05/2020      Patient ID: Savannah nAdres is a 84 y.o. female.    Chief Complaint:  History of Present Illness    This is an 84-year-old with a history of complete heart block status post dual-chamber permanent pacemaker placement on 11/2016, hypertension, cardiomyopathy with an EF of 38%, who presents for follow-up.     Patient was previously followed by Dr. Angel who she last saw the office in 12/2018.  At that time she was doing well and no changes were made to her management.  She was last seen in the office by MERYL Miranda in 6/2019 for routine follow-up.  At that time she was noted to have elevated blood pressures and it was recommended by both Dr. Del Angel and Liliana that she increase her lisinopril to 10 mg a day.  At that office visit she also reported increased thirst especially in the evening that she reported at that time was related to furosemide use.  At that office visit it was reported by both the patient and Dr. Angel that she had had a repeat echocardiogram since 2016 that showed normalization of her left ventricular function although a copy of that echo report is not available for review.     I saw the patient initially in 12/2019 at which time she reported that she tried increasing the lisinopril to 10 mg for about 2 or 3 days but backed off on back to the 5 mg a day.  She claimed that this was because she felt like it was causing issues with increased thirst.  She claimed that that increased thirst improved when she backed off on the lisinopril.  She also admited that she was worried that the increase in lisinopril would lead to unsteadiness which could result in falls.  Is unclear if she experience any of those issues while taking the higher dose of lisinopril.  At that office visit she reported that her systolic pressures were in the 140s-160s.  That office visit I recommended that she try increasing her lisinopril to 10 mg again.    Today she presents for  routine follow-up.  She reports that she tried it again for a few days but resulted in the same symptoms of increased thirst so she opted to back off and go back to 5 mg a day.  She otherwise denies any chest pain, shortness of breath, palpitations, orthopnea, near-syncope or syncope, or lower extremity edema.  She reports that they stopped checking regular blood pressures at her assisted living facility.  She reports some increase in her chronic bilateral lower extremity edema.  This usually is noticeable at the end of the day and resolves overnight.    Review of Systems   Constitution: Negative for malaise/fatigue.   HENT: Negative for hearing loss, hoarse voice, nosebleeds and sore throat.    Eyes: Negative for pain.   Cardiovascular: Negative for chest pain, claudication, cyanosis, dyspnea on exertion, irregular heartbeat, leg swelling, near-syncope, orthopnea, palpitations, paroxysmal nocturnal dyspnea and syncope.   Respiratory: Negative for shortness of breath and snoring.    Endocrine: Negative for cold intolerance, heat intolerance, polydipsia, polyphagia and polyuria.   Skin: Negative for itching and rash.   Musculoskeletal: Negative for arthritis, falls, joint pain, joint swelling, muscle cramps, muscle weakness and myalgias.   Gastrointestinal: Negative for constipation, diarrhea, dysphagia, heartburn, hematemesis, hematochezia, melena, nausea and vomiting.   Genitourinary: Negative for frequency, hematuria and hesitancy.   Neurological: Negative for excessive daytime sleepiness, dizziness, headaches, light-headedness, numbness and weakness.   Psychiatric/Behavioral: Negative for depression. The patient is not nervous/anxious.          Current Outpatient Medications:   •  lisinopril (PRINIVIL,ZESTRIL) 10 MG tablet, Take 1 tablet by mouth Daily. (Patient taking differently: Take 5 mg by mouth Daily.), Disp: , Rfl:   •  metoprolol succinate XL (TOPROL-XL) 50 MG 24 hr tablet, TAKE ONE TABLET BY MOUTH DAILY,  "Disp: 90 tablet, Rfl: 3  •  erythromycin (ROMYCIN) 5 MG/GM ophthalmic ointment, Administer  to the right eye 2 (Two) Times a Day., Disp: 3.5 g, Rfl: 0  •  furosemide (LASIX) 20 MG tablet, Take 1 tablet by mouth Daily., Disp: 90 tablet, Rfl: 1    Past Medical History:   Diagnosis Date   • Bilateral leg edema    • CHB (complete heart block) (CMS/HCC)     3rd degree   • CHF (congestive heart failure) (CMS/HCC)    • Hypertension    • Loss of consciousness (CMS/HCC)    • Skin cyst    • SOB (shortness of breath)    • Symptomatic bradycardia    • Syncope        Past Surgical History:   Procedure Laterality Date   • CARDIAC ELECTROPHYSIOLOGY PROCEDURE N/A 11/23/2016    Procedure: Pacemaker DC new  [employer];  Surgeon: Ananth Malcolm MD;  Location: [facility] INVASIVE LOCATION;  Service:    • PACEMAKER IMPLANTATION         Family History   Problem Relation Age of Onset   • Hypertension Mother    • Hypertension Father        Social History     Tobacco Use   • Smoking status: Never Smoker   • Smokeless tobacco: Never Used   • Tobacco comment: caffeine use   Substance Use Topics   • Alcohol use: No   • Drug use: No         ECG 12 Lead  Date/Time: 3/5/2020 5:57 PM  Performed by: Alia Dow MD  Authorized by: Alia Dow MD   Comparison: compared with previous ECG   Similar to previous ECG  Pacing: ventricular paced rhythm             Objective:     Visit Vitals  /94 (BP Location: Left arm, Patient Position: Sitting)   Pulse 63   Ht 160 cm (63\")   Wt 49 kg (108 lb)   BMI 19.13 kg/m²         Physical Exam   Constitutional: She is oriented to person, place, and time. She appears well-developed and well-nourished.   HENT:   Head: Normocephalic and atraumatic.   Eyes: Pupils are equal, round, and reactive to light. Conjunctivae, EOM and lids are normal.   Neck: Normal range of motion and full passive range of motion without pain. Neck supple. No JVD present. Carotid bruit is not present.   Cardiovascular: Normal " rate, regular rhythm, S1 normal and S2 normal. Exam reveals no gallop.   No murmur heard.  Pulses:       Radial pulses are 2+ on the right side, and 2+ on the left side.   No bilateral lower extremity edema  + varicose veins   Pulmonary/Chest: Effort normal and breath sounds normal.   Abdominal: Soft. Normal appearance.   Lymphadenopathy:     She has no cervical adenopathy.   Neurological: She is alert and oriented to person, place, and time.   Skin: Skin is warm, dry and intact.   Psychiatric: She has a normal mood and affect.       Lab Review:       Assessment:          Diagnosis Plan   1. Essential hypertension     2. Presence of permanent cardiac pacemaker     3. Pulmonary hypertension (CMS/HCC)     4. Cardiomyopathy, unspecified type (CMS/HCC)     5. Third degree heart block (CMS/HCC)     6. Valvular heart disease            Plan:       1.  Hypertension.  Not ideally controlled but I think at this point I would not be aggressive about treating her blood pressures specially with the lack of symptoms and at her age.  We will continue her current medical management.  2.  Complete heart block status post dual-chamber permanent pacemaker placement.  She had a remote check performed today that showed normal function and no recent events.  She is a paced about 52% of the time and ventricularly paced to 100% of the time.  3.  History of cardiomyopathy.  EF noted to be around 38% in 2016.  She reportedly had a repeat echocardiogram at some point that showed normalization of her function although I do not have this echocardiogram to review.  4.  Chronic bilateral lower extremity edema.  I suspect this is related to her bilateral lower extremity varicose veins.  We discussed continuing the furosemide, keeping her legs elevated when possible, and trying to avoid high sodium foods although the patient admits this is difficult at the assisted living facility.    We will plan on seeing the patient back again in 6  months.

## 2020-05-18 RX ORDER — METOPROLOL SUCCINATE 50 MG/1
50 TABLET, EXTENDED RELEASE ORAL DAILY
Qty: 90 TABLET | Refills: 1 | Status: SHIPPED | OUTPATIENT
Start: 2020-05-18 | End: 2020-05-29 | Stop reason: SDUPTHER

## 2020-05-29 RX ORDER — METOPROLOL SUCCINATE 50 MG/1
50 TABLET, EXTENDED RELEASE ORAL DAILY
Qty: 90 TABLET | Refills: 1 | Status: SHIPPED | OUTPATIENT
Start: 2020-05-29 | End: 2020-11-20

## 2020-06-09 RX ORDER — LISINOPRIL 10 MG/1
5 TABLET ORAL DAILY
Qty: 45 TABLET | Refills: 1 | Status: SHIPPED | OUTPATIENT
Start: 2020-06-09 | End: 2020-06-17 | Stop reason: DRUGHIGH

## 2020-06-17 RX ORDER — LISINOPRIL 5 MG/1
5 TABLET ORAL DAILY
Qty: 90 TABLET | Refills: 1 | Status: SHIPPED | OUTPATIENT
Start: 2020-06-17 | End: 2020-12-14

## 2020-08-10 RX ORDER — FUROSEMIDE 20 MG/1
TABLET ORAL
Qty: 90 TABLET | Refills: 0 | Status: SHIPPED | OUTPATIENT
Start: 2020-08-10 | End: 2020-11-18

## 2020-09-17 ENCOUNTER — OFFICE VISIT (OUTPATIENT)
Dept: CARDIOLOGY | Facility: CLINIC | Age: 85
End: 2020-09-17

## 2020-09-17 ENCOUNTER — CLINICAL SUPPORT NO REQUIREMENTS (OUTPATIENT)
Dept: CARDIOLOGY | Facility: CLINIC | Age: 85
End: 2020-09-17

## 2020-09-17 VITALS
HEART RATE: 69 BPM | HEIGHT: 63 IN | WEIGHT: 103.8 LBS | BODY MASS INDEX: 18.39 KG/M2 | SYSTOLIC BLOOD PRESSURE: 158 MMHG | DIASTOLIC BLOOD PRESSURE: 78 MMHG

## 2020-09-17 DIAGNOSIS — I50.22 CHRONIC SYSTOLIC CONGESTIVE HEART FAILURE, NYHA CLASS 3 (HCC): ICD-10-CM

## 2020-09-17 DIAGNOSIS — I27.20 PULMONARY HYPERTENSION (HCC): ICD-10-CM

## 2020-09-17 DIAGNOSIS — I44.2 THIRD DEGREE HEART BLOCK (HCC): ICD-10-CM

## 2020-09-17 DIAGNOSIS — I38 VALVULAR HEART DISEASE: ICD-10-CM

## 2020-09-17 DIAGNOSIS — I10 ESSENTIAL HYPERTENSION: Primary | ICD-10-CM

## 2020-09-17 DIAGNOSIS — Z95.0 PRESENCE OF PERMANENT CARDIAC PACEMAKER: ICD-10-CM

## 2020-09-17 DIAGNOSIS — I44.2 COMPLETE HEART BLOCK (HCC): Primary | ICD-10-CM

## 2020-09-17 DIAGNOSIS — I42.9 CARDIOMYOPATHY, UNSPECIFIED TYPE (HCC): ICD-10-CM

## 2020-09-17 PROCEDURE — 93000 ELECTROCARDIOGRAM COMPLETE: CPT | Performed by: INTERNAL MEDICINE

## 2020-09-17 PROCEDURE — 99214 OFFICE O/P EST MOD 30 MIN: CPT | Performed by: INTERNAL MEDICINE

## 2020-09-17 PROCEDURE — 93280 PM DEVICE PROGR EVAL DUAL: CPT | Performed by: INTERNAL MEDICINE

## 2020-09-17 NOTE — PROGRESS NOTES
Subjective:     Encounter Date:09/17/2020      Patient ID: Savannah Andres is a 85 y.o. female.    Chief Complaint:  History of Present Illness    This is an 85-year-old with a history of complete heart block status post dual-chamber permanent pacemaker placement on 11/2016, hypertension, cardiomyopathy with an EF of 38%, who presents for follow-up.     She presents today for routine 6-month follow-up.  She continues to have multiple complaints most of which are not cardiac.  She does report that she has not been able to be as active as she was in the past at her assisted living facility because of the COVID-19 pandemic.  She also feels like her food is too salty there and because of that she does not eat a lot of food that they serve.  She reports that she feels more fatigued in the afternoon but admits that she is often up and busy cleaning her cats litter box in the morning.  She otherwise denies any shortness of breath, chest pain, palpitations, orthopnea, near-syncope or syncope.  She has chronic ankle edema which is unchanged.  She has issues with increased thirst this is chronic and unchanged.  Her blood pressures have been stable.    Prior History:  Patient was previously followed by Dr. Angel who she last saw the office in 12/2018.  At that time she was doing well and no changes were made to her management.  She was last seen in the office by MERYL Miranda in 6/2019 for routine follow-up.  At that time she was noted to have elevated blood pressures and it was recommended by both Dr. Del Angel and Liliana that she increase her lisinopril to 10 mg a day.  At that office visit she also reported increased thirst especially in the evening that she reported at that time was related to furosemide use.  At that office visit it was reported by both the patient and Dr. Angel that she had had a repeat echocardiogram since 2016 that showed normalization of her left ventricular function although a copy of that echo report  is not available for review.     I saw the patient initially in 12/2019 at which time she reported that she tried increasing the lisinopril to 10 mg for about 2 or 3 days but backed off on back to the 5 mg a day.  She claimed that this was because she felt like it was causing issues with increased thirst.  She claimed that that increased thirst improved when she backed off on the lisinopril.  She also admited that she was worried that the increase in lisinopril would lead to unsteadiness which could result in falls.  Is unclear if she experience any of those issues while taking the higher dose of lisinopril.  At that office visit she reported that her systolic pressures were in the 140s-160s.  That office visit I recommended that she try increasing her lisinopril to 10 mg again.     In follow-up in 3/2020 she reported that she tried the increased dose but opted to go back to 5 mg a day after she developed increased thirst again.      Review of Systems   Constitution: Positive for malaise/fatigue.   HENT: Negative for hearing loss, hoarse voice, nosebleeds and sore throat.    Eyes: Negative for pain.   Cardiovascular: Positive for leg swelling. Negative for chest pain, claudication, cyanosis, dyspnea on exertion, irregular heartbeat, near-syncope, orthopnea, palpitations, paroxysmal nocturnal dyspnea and syncope.   Respiratory: Negative for shortness of breath and snoring.    Endocrine: Negative for cold intolerance, heat intolerance, polydipsia, polyphagia and polyuria.   Skin: Negative for itching and rash.   Musculoskeletal: Negative for arthritis, falls, joint pain, joint swelling, muscle cramps, muscle weakness and myalgias.   Gastrointestinal: Negative for constipation, diarrhea, dysphagia, heartburn, hematemesis, hematochezia, melena, nausea and vomiting.   Genitourinary: Negative for frequency, hematuria and hesitancy.   Neurological: Negative for excessive daytime sleepiness, dizziness, headaches,  "light-headedness, numbness and weakness.   Psychiatric/Behavioral: Negative for depression. The patient is not nervous/anxious.          Current Outpatient Medications:   •  furosemide (LASIX) 20 MG tablet, TAKE ONE TABLET BY MOUTH DAILY, Disp: 90 tablet, Rfl: 0  •  lisinopril (PRINIVIL,ZESTRIL) 5 MG tablet, Take 1 tablet by mouth Daily., Disp: 90 tablet, Rfl: 1  •  metoprolol succinate XL (TOPROL-XL) 50 MG 24 hr tablet, Take 1 tablet by mouth Daily., Disp: 90 tablet, Rfl: 1    Past Medical History:   Diagnosis Date   • Bilateral leg edema    • CHB (complete heart block) (CMS/HCC)     3rd degree   • CHF (congestive heart failure) (CMS/HCC)    • Hypertension    • Loss of consciousness (CMS/HCC)    • Skin cyst    • SOB (shortness of breath)    • Symptomatic bradycardia    • Syncope        Past Surgical History:   Procedure Laterality Date   • CARDIAC ELECTROPHYSIOLOGY PROCEDURE N/A 11/23/2016    Procedure: Pacemaker DC new  [employer];  Surgeon: Ananth Malcolm MD;  Location: [facility] INVASIVE LOCATION;  Service:    • PACEMAKER IMPLANTATION         Family History   Problem Relation Age of Onset   • Hypertension Mother    • Hypertension Father        Social History     Tobacco Use   • Smoking status: Never Smoker   • Smokeless tobacco: Never Used   • Tobacco comment: caffeine use   Substance Use Topics   • Alcohol use: No   • Drug use: No         ECG 12 Lead    Date/Time: 9/17/2020 4:21 PM  Performed by: Alia Dow MD  Authorized by: Alia Dow MD   Comparison: compared with previous ECG   Similar to previous ECG  Comments: Atrial-ventricular paced rhythm               Objective:     Visit Vitals  /78   Pulse 69   Ht 160 cm (63\")   Wt 47.1 kg (103 lb 12.8 oz)   BMI 18.39 kg/m²         Constitutional:       Appearance: Normal appearance. Well-developed.   Eyes:      General: Lids are normal.      Conjunctiva/sclera: Conjunctivae normal.      Pupils: Pupils are equal, round, and reactive to light. "   HENT:      Head: Normocephalic and atraumatic.   Neck:      Musculoskeletal: Full passive range of motion without pain, normal range of motion and neck supple.      Vascular: No carotid bruit or JVD.      Lymphadenopathy: No cervical adenopathy.   Pulmonary:      Effort: Pulmonary effort is normal.      Breath sounds: Normal breath sounds.   Cardiovascular:      Normal rate. Regular rhythm.      No gallop.   Pulses:     Radial: 2+ bilaterally.  Edema:     Peripheral edema absent.   Abdominal:      Palpations: Abdomen is soft.   Skin:     General: Skin is warm and dry.   Neurological:      Mental Status: Alert and oriented to person, place, and time.             Assessment:          Diagnosis Plan   1. Essential hypertension     2. Presence of permanent cardiac pacemaker     3. Valvular heart disease     4. Pulmonary hypertension (CMS/HCC)     5. Chronic systolic congestive heart failure, NYHA class 3 (CMS/HCC)     6. Third degree heart block (CMS/HCC)     7. Cardiomyopathy, unspecified type (CMS/HCC)            Plan:       1.  Cardiomyopathy.  EF of about 38% in 2016.  She reportedly had an echocardiogram repeated that showed normalization of her heart function although I do not have a report of this to review.  2.  Hypertension.  Blood pressures remain mildly elevated with systolics in the 150s to 160s.  She has been unable to tolerate any up titration of her medications in the past.  At her age I think as long she remained stable we will not aggressively pursue further titration of her medications.  3.  Complete heart block status post dual-chamber permanent pacemaker placement.  She missed her remote check earlier this year and is due for in office check.  We will go ahead and have this checked in the office while she is here.    We will plan on seeing the patient back again in 6 months.

## 2020-11-19 RX ORDER — FUROSEMIDE 20 MG/1
TABLET ORAL
Qty: 90 TABLET | Refills: 1 | Status: SHIPPED | OUTPATIENT
Start: 2020-11-19 | End: 2021-05-17

## 2020-11-20 RX ORDER — METOPROLOL SUCCINATE 50 MG/1
TABLET, EXTENDED RELEASE ORAL
Qty: 90 TABLET | Refills: 1 | Status: SHIPPED | OUTPATIENT
Start: 2020-11-20 | End: 2021-11-24 | Stop reason: SDUPTHER

## 2020-12-14 RX ORDER — LISINOPRIL 5 MG/1
TABLET ORAL
Qty: 90 TABLET | Refills: 1 | Status: SHIPPED | OUTPATIENT
Start: 2020-12-14 | End: 2021-03-19

## 2020-12-17 RX ORDER — LISINOPRIL 5 MG/1
TABLET ORAL
Qty: 90 TABLET | Refills: 0 | OUTPATIENT
Start: 2020-12-17

## 2021-03-19 ENCOUNTER — CLINICAL SUPPORT NO REQUIREMENTS (OUTPATIENT)
Dept: CARDIOLOGY | Facility: CLINIC | Age: 86
End: 2021-03-19

## 2021-03-19 ENCOUNTER — OFFICE VISIT (OUTPATIENT)
Dept: CARDIOLOGY | Facility: CLINIC | Age: 86
End: 2021-03-19

## 2021-03-19 VITALS
BODY MASS INDEX: 18.95 KG/M2 | HEIGHT: 62 IN | DIASTOLIC BLOOD PRESSURE: 94 MMHG | SYSTOLIC BLOOD PRESSURE: 184 MMHG | WEIGHT: 103 LBS | HEART RATE: 60 BPM

## 2021-03-19 DIAGNOSIS — Z95.0 PRESENCE OF PERMANENT CARDIAC PACEMAKER: ICD-10-CM

## 2021-03-19 DIAGNOSIS — I44.2 THIRD DEGREE HEART BLOCK (HCC): ICD-10-CM

## 2021-03-19 DIAGNOSIS — I44.2 THIRD DEGREE HEART BLOCK (HCC): Primary | ICD-10-CM

## 2021-03-19 DIAGNOSIS — I27.20 PULMONARY HYPERTENSION (HCC): ICD-10-CM

## 2021-03-19 DIAGNOSIS — I42.9 CARDIOMYOPATHY, UNSPECIFIED TYPE (HCC): ICD-10-CM

## 2021-03-19 DIAGNOSIS — I10 ESSENTIAL HYPERTENSION: Primary | ICD-10-CM

## 2021-03-19 PROCEDURE — 93000 ELECTROCARDIOGRAM COMPLETE: CPT | Performed by: INTERNAL MEDICINE

## 2021-03-19 PROCEDURE — 99214 OFFICE O/P EST MOD 30 MIN: CPT | Performed by: INTERNAL MEDICINE

## 2021-03-19 PROCEDURE — 93280 PM DEVICE PROGR EVAL DUAL: CPT | Performed by: INTERNAL MEDICINE

## 2021-03-19 RX ORDER — LISINOPRIL 5 MG/1
TABLET ORAL
Qty: 90 TABLET | Refills: 1
Start: 2021-03-19 | End: 2021-05-17

## 2021-03-19 NOTE — PROGRESS NOTES
Subjective:     Encounter Date:03/19/2021      Patient ID: Savannah Andres is a 85 y.o. female.    Chief Complaint:  History of Present Illness    This is an 85-year-old with a history of complete heart block status post dual-chamber permanent pacemaker placement on 11/2016, hypertension, cardiomyopathy with an EF of 38%, who presents for follow-up.     She presents today for routine follow-up.  In 9/2020 at her last follow-up blood pressures worse in the 150s but I opted not to make any changes since the patient repeatedly reported issues with titrating her medications further.  Today she denies any chest pain, palpitations, orthopnea, near-syncope or syncope.  She has chronic, intermittent lower extremity edema which is stable.  She has dizziness with position changes which is unchanged.  She reports stable dyspnea on exertion.     Prior History:  Patient was previously followed by Dr. Angel who she last saw the office in 12/2018.  At that time she was doing well and no changes were made to her management.  She was last seen in the office by MERYL Miranda in 6/2019 for routine follow-up.  At that time she was noted to have elevated blood pressures and it was recommended by both Dr. Del Angel and Liliana that she increase her lisinopril to 10 mg a day.  At that office visit she also reported increased thirst especially in the evening that she reported at that time was related to furosemide use.  At that office visit it was reported by both the patient and Dr. Angel that she had had a repeat echocardiogram since 2016 that showed normalization of her left ventricular function although a copy of that echo report is not available for review.     I saw the patient initially in 12/2019 at which time she reported that she tried increasing the lisinopril to 10 mg for about 2 or 3 days but backed off on back to the 5 mg a day.  She claimed that this was because she felt like it was causing issues with increased thirst.  She  claimed that that increased thirst improved when she backed off on the lisinopril.  She also admited that she was worried that the increase in lisinopril would lead to unsteadiness which could result in falls.  Is unclear if she experience any of those issues while taking the higher dose of lisinopril.  At that office visit she reported that her systolic pressures were in the 140s-160s.  That office visit I recommended that she try increasing her lisinopril to 10 mg again.     In follow-up in 3/2020 she reported that she tried the increased dose but opted to go back to 5 mg a day after she developed increased thirst again.      Review of Systems   Constitutional: Positive for malaise/fatigue.   HENT: Negative for hearing loss, hoarse voice, nosebleeds and sore throat.    Eyes: Negative for pain.   Cardiovascular: Positive for leg swelling. Negative for chest pain, claudication, cyanosis, dyspnea on exertion, irregular heartbeat, near-syncope, orthopnea, palpitations, paroxysmal nocturnal dyspnea and syncope.   Respiratory: Negative for shortness of breath and snoring.    Endocrine: Negative for cold intolerance, heat intolerance, polydipsia, polyphagia and polyuria.   Skin: Negative for itching and rash.   Musculoskeletal: Negative for arthritis, falls, joint pain, joint swelling, muscle cramps, muscle weakness and myalgias.   Gastrointestinal: Negative for constipation, diarrhea, dysphagia, heartburn, hematemesis, hematochezia, melena, nausea and vomiting.   Genitourinary: Negative for frequency, hematuria and hesitancy.   Neurological: Positive for light-headedness. Negative for excessive daytime sleepiness, dizziness, headaches, numbness and weakness.   Psychiatric/Behavioral: Negative for depression. The patient is not nervous/anxious.          Current Outpatient Medications:   •  furosemide (LASIX) 20 MG tablet, TAKE ONE TABLET BY MOUTH DAILY, Disp: 90 tablet, Rfl: 1  •  lisinopril (PRINIVIL,ZESTRIL) 5 MG  "tablet, TAKE ONE TABLET BY MOUTH DAILY, Disp: 90 tablet, Rfl: 1  •  metoprolol succinate XL (TOPROL-XL) 50 MG 24 hr tablet, TAKE ONE TABLET BY MOUTH DAILY, Disp: 90 tablet, Rfl: 1    Past Medical History:   Diagnosis Date   • Bilateral leg edema    • CHB (complete heart block) (CMS/HCC)     3rd degree   • CHF (congestive heart failure) (CMS/HCC)    • Hypertension    • Loss of consciousness (CMS/HCC)    • Skin cyst    • SOB (shortness of breath)    • Symptomatic bradycardia    • Syncope        Past Surgical History:   Procedure Laterality Date   • CARDIAC ELECTROPHYSIOLOGY PROCEDURE N/A 11/23/2016    Procedure: Pacemaker DC new  [employer];  Surgeon: Ananth Malcolm MD;  Location: [facility] INVASIVE LOCATION;  Service:    • PACEMAKER IMPLANTATION         Family History   Problem Relation Age of Onset   • Hypertension Mother    • Hypertension Father        Social History     Tobacco Use   • Smoking status: Never Smoker   • Smokeless tobacco: Never Used   • Tobacco comment: caffeine use   Substance Use Topics   • Alcohol use: No   • Drug use: No         ECG 12 Lead    Date/Time: 3/19/2021 4:51 PM  Performed by: Alia Dow MD  Authorized by: Alia Dow MD   Comparison: compared with previous ECG   Comments: Atrial-ventricular paced rhythm               Objective:     Visit Vitals  BP (!) 184/94 (BP Location: Right arm, Patient Position: Sitting, Cuff Size: Adult)   Pulse 60   Ht 157.5 cm (62\")   Wt 46.7 kg (103 lb)   BMI 18.84 kg/m²         Constitutional:       Appearance: Normal appearance. Well-developed.   Eyes:      General: Lids are normal.      Conjunctiva/sclera: Conjunctivae normal.      Pupils: Pupils are equal, round, and reactive to light.   HENT:      Head: Normocephalic and atraumatic.   Neck:      Vascular: No carotid bruit or JVD.      Lymphadenopathy: No cervical adenopathy.   Pulmonary:      Effort: Pulmonary effort is normal.      Breath sounds: Normal breath sounds.   Cardiovascular:     "  Normal rate. Regular rhythm.      No gallop.   Pulses:     Radial: 2+ bilaterally.  Edema:     Peripheral edema absent.   Abdominal:      Palpations: Abdomen is soft.   Musculoskeletal:      Cervical back: Full passive range of motion without pain, normal range of motion and neck supple. Skin:     General: Skin is warm and dry.   Neurological:      Mental Status: Alert and oriented to person, place, and time.             Assessment:          Diagnosis Plan   1. Essential hypertension     2. Presence of permanent cardiac pacemaker     3. Cardiomyopathy, unspecified type (CMS/HCC)     4. Third degree heart block (CMS/HCC)     5. Pulmonary hypertension (CMS/HCC)            Plan:       1.  Hypertension.  Remains poorly controlled.  We discussed options again today and I recommended trying to take the lisinopril twice a day.  I even suggested we try taking 5 mg in the morning and 2.5 mg in the evening and see how she does with that.  The patient reports that she will consider trying this.  2.  Cardiomyopathy.  EF of about 38% in 2016.  Reportedly she has had an echocardiogram since then that showed normalization of her heart function but I do not have a report to indicate this.  No evidence of significant volume overload.  3.  Complete heart block status post dual-chamber permanent pacemaker placement.  She underwent device interrogation in the office today which showed normal function and no events.  Discussed during remote check in 3 months.  Regardless she will have another in office device check at her next follow-up in 6 months.    We will see the patient back again in 6 months.

## 2021-05-17 DIAGNOSIS — I10 ESSENTIAL HYPERTENSION: Primary | ICD-10-CM

## 2021-05-17 RX ORDER — FUROSEMIDE 20 MG/1
TABLET ORAL
Qty: 90 TABLET | Refills: 0 | Status: SHIPPED | OUTPATIENT
Start: 2021-05-17 | End: 2021-08-16

## 2021-05-17 RX ORDER — LISINOPRIL 5 MG/1
TABLET ORAL
Qty: 135 TABLET | Refills: 0 | Status: SHIPPED | OUTPATIENT
Start: 2021-05-17 | End: 2021-08-26

## 2021-05-17 NOTE — TELEPHONE ENCOUNTER
I will refill for 90 days.  In the meanwhile she needs to get a basic metabolic panel.  I placed orders for a BMP in epic.

## 2021-05-17 NOTE — TELEPHONE ENCOUNTER
Called s/w patient and told her Dr. Dow wanted her to get a BMP done. She states she will go next week.

## 2021-08-16 RX ORDER — FUROSEMIDE 20 MG/1
TABLET ORAL
Qty: 90 TABLET | Refills: 0 | Status: SHIPPED | OUTPATIENT
Start: 2021-08-16 | End: 2021-09-21

## 2021-08-16 NOTE — TELEPHONE ENCOUNTER
I will go ahead and refill for 90 days more.    A BMP was ordered at the time of her last refill request.  The patient was supposed to get that done at that time.  Can you find out if she had this done somewhere else.  Otherwise please see that she gets this done so I can continue to refill her medications.  Let her know that I will not be able to continue refilling her medications otherwise.

## 2021-08-16 NOTE — TELEPHONE ENCOUNTER
Last OV 3/19/21.  Next OV 9/21/21.  Labs  2/6/20.  Does not meet refill protocol.  Please advise.    [facility]

## 2021-08-26 RX ORDER — LISINOPRIL 5 MG/1
TABLET ORAL
Qty: 135 TABLET | Refills: 1 | Status: SHIPPED | OUTPATIENT
Start: 2021-08-26

## 2021-09-02 ENCOUNTER — OFFICE VISIT (OUTPATIENT)
Dept: CARDIOLOGY | Facility: CLINIC | Age: 86
End: 2021-09-02

## 2021-09-02 ENCOUNTER — LAB (OUTPATIENT)
Dept: LAB | Facility: HOSPITAL | Age: 86
End: 2021-09-02

## 2021-09-02 ENCOUNTER — TELEPHONE (OUTPATIENT)
Dept: CARDIOLOGY | Facility: CLINIC | Age: 86
End: 2021-09-02

## 2021-09-02 VITALS
DIASTOLIC BLOOD PRESSURE: 90 MMHG | SYSTOLIC BLOOD PRESSURE: 180 MMHG | HEIGHT: 62 IN | WEIGHT: 97.4 LBS | BODY MASS INDEX: 17.92 KG/M2 | HEART RATE: 66 BPM

## 2021-09-02 DIAGNOSIS — I38 VALVULAR HEART DISEASE: ICD-10-CM

## 2021-09-02 DIAGNOSIS — I44.2 THIRD DEGREE HEART BLOCK (HCC): ICD-10-CM

## 2021-09-02 DIAGNOSIS — I44.2 THIRD DEGREE HEART BLOCK (HCC): Primary | ICD-10-CM

## 2021-09-02 DIAGNOSIS — I42.9 CARDIOMYOPATHY, UNSPECIFIED TYPE (HCC): ICD-10-CM

## 2021-09-02 DIAGNOSIS — Z95.0 PRESENCE OF PERMANENT CARDIAC PACEMAKER: ICD-10-CM

## 2021-09-02 DIAGNOSIS — I10 ESSENTIAL HYPERTENSION: ICD-10-CM

## 2021-09-02 LAB
ANION GAP SERPL CALCULATED.3IONS-SCNC: 9.8 MMOL/L (ref 5–15)
BUN SERPL-MCNC: 13 MG/DL (ref 8–23)
BUN/CREAT SERPL: 15.5 (ref 7–25)
CALCIUM SPEC-SCNC: 10 MG/DL (ref 8.6–10.5)
CHLORIDE SERPL-SCNC: 101 MMOL/L (ref 98–107)
CO2 SERPL-SCNC: 31.2 MMOL/L (ref 22–29)
CREAT SERPL-MCNC: 0.84 MG/DL (ref 0.57–1)
GFR SERPL CREATININE-BSD FRML MDRD: 64 ML/MIN/1.73
GLUCOSE SERPL-MCNC: 127 MG/DL (ref 65–99)
POTASSIUM SERPL-SCNC: 4.2 MMOL/L (ref 3.5–5.2)
SODIUM SERPL-SCNC: 142 MMOL/L (ref 136–145)

## 2021-09-02 PROCEDURE — 93000 ELECTROCARDIOGRAM COMPLETE: CPT | Performed by: NURSE PRACTITIONER

## 2021-09-02 PROCEDURE — 36415 COLL VENOUS BLD VENIPUNCTURE: CPT

## 2021-09-02 PROCEDURE — 99214 OFFICE O/P EST MOD 30 MIN: CPT | Performed by: NURSE PRACTITIONER

## 2021-09-02 PROCEDURE — 80048 BASIC METABOLIC PNL TOTAL CA: CPT

## 2021-09-02 NOTE — PROGRESS NOTES
Date of Office Visit: 21  Encounter Provider: MERYL Lord  Primary Cardiologist: Dr. Dow  Place of Service: [facility] CARDIOLOGY  Patient Name: Savannah Andres  :1935      Subjective:     Chief Complaint:  Fatigue, HTN, thirst, confusion    History of Present Illness:  Savannah Andres is a pleasant 86 y.o. female who I have seen once before.  Outside records have been requested and reviewed by me if available.     This is a patient of Dr. Dow that previously followed with Dr. Angel with history of complete heart block status post dual-chamber permanent pacemaker 2016, hypertension, cardiomyopathy.    I saw her once 2019. for 6-month follow-up visit.  Apparently her blood pressure was high at the [facility] and her PCP tried to increase her lisinopril on numerous occasions but she declined.  She is having some blurred and double vision intermittently ongoing for over a year no other symptoms.  She reported increased thirst that she felt was related to her diuretic.  She was later agreeable to increase the lisinopril.  At this office visit it was reported by the patient that Dr. Queen had repeated an echocardiogram in  though report was not available and that she had normalization of LV function.    3/2020 patient reported that she tried increasing the dose but opted to go back to 5 mg a day after she developed increased thirst again.    2020 patient had worsening blood pressures in the 150s but it was opted not to change medication as she had issues with titrating medications in the past.    3/19/2021 was her last office visit with Dr. Dow she had chronic intermittent lower extremity edema that was stable no chest pain palpitations orthopnea.  She had dizziness with position changes that was unchanged and stable dyspnea on exertion.  Her blood pressure was poorly controlled and it was recommended that she try taking lisinopril twice a day including 5 mg in the  "morning and even 2.5 mg in the evening and see how she did.    She is presenting today for 6-month follow-up.  Unfortunately the patient comes alone to her appointment today.  According to appointment notes she is having increased thirst, confusion, fatigue and has refused labs.  It is really unclear why the patient is here today.  She states that it was recommended by the nurse at the [facility] to come sooner because she had been feeling fatigued and was having periods of confusion which apparently the patient states is better.  While here when I asked her to tell me how she has been confused she said she has 1 cat and she thought she saw 2 cats in her apartment the other day and when she talked to her daughter her daughter reminded her she only had one cat.  She also reports excessive thirst which is not a new complaint for her.  She denies any exertional chest pain, rest dyspnea, PND, orthopnea.  She has chronic stable dyspnea if she climbs stairs too quickly and chronic stable lower extremity edema.  She has a little dizziness with position changes that is unchanged and not cause any falls, syncope, near syncope.  The other day she felt a slight \"something at her pacemaker site it was very mild and did not last all    Patient's daughters number that I was given by MA from her facility [phone].  I tried calling the patient's daughter called \"but she did not answer.  My medical assistant apparently spoke with her and she had really did not know what was going on with the patient as she was dealing with the death of his sister it was other than the fact that she reported excessive thirst again which does not appear to be a new complaint.    Called and left a message for the resident  is apparently a nurse at [phone] and left a message for Nicole Gu too call me back.    Past Medical History:   Diagnosis Date   • Bilateral leg edema    • CHB (complete heart block) (CMS/HCC)     3rd degree   • " CHF (congestive heart failure) (CMS/HCC)    • Hypertension    • Loss of consciousness (CMS/HCC)    • Skin cyst    • SOB (shortness of breath)    • Symptomatic bradycardia    • Syncope      Past Surgical History:   Procedure Laterality Date   • CARDIAC ELECTROPHYSIOLOGY PROCEDURE N/A 11/23/2016    Procedure: Pacemaker DC new  [employer];  Surgeon: Ananth Malcolm MD;  Location: [facility] INVASIVE LOCATION;  Service:    • PACEMAKER IMPLANTATION       Outpatient Medications Prior to Visit   Medication Sig Dispense Refill   • furosemide (LASIX) 20 MG tablet TAKE ONE TABLET BY MOUTH DAILY 90 tablet 0   • lisinopril (PRINIVIL,ZESTRIL) 5 MG tablet TAKE 1 AND 1/2 TABLET BY MOUTH DAILY 135 tablet 1   • metoprolol succinate XL (TOPROL-XL) 50 MG 24 hr tablet TAKE ONE TABLET BY MOUTH DAILY 90 tablet 1     No facility-administered medications prior to visit.       Allergies as of 09/02/2021   • (No Known Allergies)     Social History     Socioeconomic History   • Marital status: Single     Spouse name: Not on file   • Number of children: 3   • Years of education: Not on file   • Highest education level: Not on file   Tobacco Use   • Smoking status: Never Smoker   • Smokeless tobacco: Never Used   • Tobacco comment: caffeine use   Substance and Sexual Activity   • Alcohol use: No   • Drug use: No   • Sexual activity: Defer     Family History   Problem Relation Age of Onset   • Hypertension Mother    • Hypertension Father      Review of Systems   Constitutional: Positive for malaise/fatigue. Negative for chills, fever, weight gain and weight loss.        Confusion- thought she had 2 cats in her room but she only has 1 cat   Eyes: Positive for blurred vision (not worse recently).   Cardiovascular: Positive for dyspnea on exertion (with stair climbing) and leg swelling (unchanged). Negative for chest pain (occasional odd feeling in left breast), irregular heartbeat, near-syncope, orthopnea, palpitations, paroxysmal nocturnal  "dyspnea and syncope.   Respiratory: Negative for cough, shortness of breath, snoring and wheezing.    Endocrine:        Excessive thirst   Hematologic/Lymphatic: Negative for bleeding problem. Does not bruise/bleed easily.   Skin: Negative for color change.   Musculoskeletal: Negative for falls.   Gastrointestinal: Negative for hematochezia and melena.   Genitourinary: Negative for hematuria.   Neurological: Positive for light-headedness (when she sits or stands up too quickly- not worse). Negative for dizziness and headaches.          Objective:     Vitals:    09/02/21 1143   BP: 180/90   Pulse: 66   Weight: 44.2 kg (97 lb 6.4 oz)   Height: 157.5 cm (62\")     Body mass index is 17.81 kg/m².    PHYSICAL EXAM:  Vitals and nursing note reviewed.   Constitutional:       General: Not in acute distress.     Appearance: Well-developed and not in distress. Not diaphoretic.   HENT:      Head: Normocephalic and atraumatic.   Neck:      Vascular: No carotid bruit or JVD.   Pulmonary:      Effort: Pulmonary effort is normal. No respiratory distress.      Breath sounds: Normal breath sounds. No decreased breath sounds. No wheezing. No rhonchi. No rales.   Chest:      Comments: Left chest permanent pacemaker  Cardiovascular:      Normal rate. Regular rhythm.      Murmurs: There is no murmur.   Pulses:     Intact distal pulses.   Edema:     Ankle: bilateral trace edema of the ankle.     Feet: bilateral trace edema of the feet.  Abdominal:      General: Bowel sounds are normal. There is no distension.      Palpations: Abdomen is soft.      Tenderness: There is no abdominal tenderness.   Skin:     General: Skin is warm and dry.      Findings: No erythema.   Neurological:      Mental Status: Alert, oriented to person, place, and time and oriented to person, place and time.      Comments: Forgetful   Psychiatric:         Attention and Perception: Attention normal.         Mood and Affect: Mood normal.         Speech: Speech normal.   "       Behavior: Behavior normal.         Thought Content: Thought content normal.         Judgment: Judgment normal.           ECG 12 Lead    Date/Time: 9/2/2021 11:44 AM  Performed by: Liliana George APRN  Authorized by: Liliana George APRN   Comparison: compared with previous ECG from 3/19/2021  Similar to previous ECG  Rhythm: paced  Rate: normal  BPM: 66  Pacing: atrial sensed rhythm and ventricular paced rhythm  Clinical impression: abnormal EKG  Comments: Indication: PPM, cardiomyopathy, HTN            Most recent lab review:  Repeat BMP today stable BUN 13, creatinine 0.84, sodium 142, potassium 4.2, glucose elevated 127, CO2 elevated 31.2  Assessment:       Diagnosis Plan   1. Third degree heart block (CMS/HCC)     2. Presence of permanent cardiac pacemaker     3. Essential hypertension     4. Cardiomyopathy, unspecified type (CMS/HCC)     5. Valvular heart disease         Plan:     1. Hypertension: Blood pressure is elevated.  Unclear to what it is running at home.  She states she took all of her medications today and is taking 7.5 mg of lisinopril on the morning.  I have recommended she take additional 5 mg tonight and get a blood pressure machine at home and monitor every day and call me with her readings over the next couple days.  I have tried reaching out to multiple family members as well as resident  who is apparently a nurse at her facility to help with blood pressure checks.  2. Cardiomyopathy: She has some stable symptoms of mild chronic lower extremity edema as well as some dyspnea with stair climbing other than that she does not have any significant symptoms.  She does not have exertional chest pain.  Will defer repeat echocardiogram to Dr. Dow.  She is on good guideline directed medical therapy with Toprol-XL, lisinopril.  Volume status appears stable on furosemide.  Her lungs are clear she is without JVD she has minimal lower extremity edema  3. Complete heart block:  Status post permanent pacemaker.  Continue with routine device checks through the pacemaker department.  We will keep her pacemaker check with her regular scheduled appointment next month with Dr. Dow that I would like her to keep to follow-up on her blood pressure.  4.  Confusion: She answers most questions appropriately regarding her medication, the year, where she is at though does appear a bit forgetful.  The basis of the confusion she states was because she has 1 cat and thought she had or saw 2 cats.  Other than that she cannot elaborate why there is issues with confusion.  My MA spoke with patient's daughter and to her knowledge she does not believe the patient has been confused  5.  Excessive thirst: This has been a chronic complaint of the patient's.  Her glucose was a little elevated on labs today.  I have recommended she see her PCP for lab work-up for noncardiac complaints.      I gave patient written instructions of the plan of care.  I am waiting call back from her family and medical personnel at the [facility] to hopefully help get her on the right track.        Follow up with her Victor M as scheduled 9/21/2021, unless otherwise needed sooner.  I advised the patient to contact our office with any questions or concerns.       It has been a pleasure to participate in this patient's care. Please feel free to contact me with any questions or concerns.     MERYL Lord  09/02/21             Your medication list          Accurate as of September 2, 2021 12:02 PM. If you have any questions, ask your nurse or doctor.            CONTINUE taking these medications      Instructions Last Dose Given Next Dose Due   furosemide 20 MG tablet  Commonly known as: LASIX      TAKE ONE TABLET BY MOUTH DAILY       lisinopril 5 MG tablet  Commonly known as: PRINIVIL,ZESTRIL      TAKE 1 AND 1/2 TABLET BY MOUTH DAILY       metoprolol succinate XL 50 MG 24 hr tablet  Commonly known as: TOPROL-XL      TAKE ONE TABLET BY  MOUTH DAILY              The above medication changes may not have been made by this provider.  Medication list was updated to reflect medications patient is currently taking including medication changes and discontinuations made by other healthcare providers or the patients themselves.     Dictated utilizing Dragon Dictation System.

## 2021-09-02 NOTE — TELEPHONE ENCOUNTER
Called and reviewed BMP results with patient. Glucose mildly elevated 127 but stable mildly elevated CO2 level 31.2 (29.0) rest within normal limits. I recommended that the patient take additional 5 mg of lisinopril tonight and get herself home blood pressure cuff and check each day and call with readings as I suspect she is going to need additional therapy but do not want to make too many changes without knowing how her blood pressure runs at home in case she has whitecoat hypertension. Still awaiting a callback from her family and the nurse at her facility to discuss the plan of care.

## 2021-09-02 NOTE — TELEPHONE ENCOUNTER
Attempted to reach the patient to discuss today's lab work and my recommendations again for blood pressure monitoring at home and to increase lisinopril.  I spoke with Sonali at her facility and she could not give me the patient's phone number.  She states she took down my name and office number and have the patient call me back.  I tried reaching patient's daughter had to leave a message called other listed phone number and also to leave a message.    I also do have a call out and have left a message with the resident  who is apparently a nurse named Nicole Gu and waiting a call back to discuss the plan of care my recommendations with her.

## 2021-09-07 NOTE — TELEPHONE ENCOUNTER
"Called and spoke with the director of resident services Nicole Gu at the facility.  They do not have nursing staff but she states she can try to get home health to come in to monitor patient's blood pressure and assist with medication.  I did speak at length with patient's son who is power of  Collin Killian and his wife Britt Andres who talks to her the most about her health care.  He has been noted that the patient has been somewhat resistant to healthcare and according to Nicole Gu has been \"defiant\".  I expressed my concerns with the patient and the family is under the impression that she had possibly run out of medication which is why she came in early and were unaware that she came in unaccompanied.  They are going to discuss with her facility moving her to where she can get more assistance and also will take her into her PCP for evaluation as there is concern for some underlying dementia as well as evaluation of her noncardiac concerns.  I explained to them her blood results as well as her blood pressure and the medication she is supposed to be taking.  I recommended that they check her blood pressure daily and if her blood pressures above 150/90 to call our office otherwise bring in readings when she comes in to see Dr. Dow in 2 weeks.  Patient son-in-law Britt wrote down all my recommendations and instructions and will keep us informed of patient's blood pressures and if patient has been compliant with her medication  to see if she needs medication adjustments.  "

## 2021-09-21 ENCOUNTER — CLINICAL SUPPORT NO REQUIREMENTS (OUTPATIENT)
Dept: CARDIOLOGY | Facility: CLINIC | Age: 86
End: 2021-09-21

## 2021-09-21 ENCOUNTER — OFFICE VISIT (OUTPATIENT)
Dept: CARDIOLOGY | Facility: CLINIC | Age: 86
End: 2021-09-21

## 2021-09-21 VITALS
WEIGHT: 94.6 LBS | HEART RATE: 89 BPM | BODY MASS INDEX: 17.41 KG/M2 | HEIGHT: 62 IN | DIASTOLIC BLOOD PRESSURE: 92 MMHG | SYSTOLIC BLOOD PRESSURE: 136 MMHG | OXYGEN SATURATION: 98 %

## 2021-09-21 DIAGNOSIS — I44.2 THIRD DEGREE HEART BLOCK (HCC): ICD-10-CM

## 2021-09-21 DIAGNOSIS — I10 ESSENTIAL HYPERTENSION: ICD-10-CM

## 2021-09-21 DIAGNOSIS — Z95.0 PRESENCE OF PERMANENT CARDIAC PACEMAKER: Primary | ICD-10-CM

## 2021-09-21 DIAGNOSIS — I42.9 CARDIOMYOPATHY, UNSPECIFIED TYPE (HCC): ICD-10-CM

## 2021-09-21 DIAGNOSIS — I44.2 COMPLETE HEART BLOCK (HCC): Primary | ICD-10-CM

## 2021-09-21 PROCEDURE — 93280 PM DEVICE PROGR EVAL DUAL: CPT | Performed by: INTERNAL MEDICINE

## 2021-09-21 PROCEDURE — 93000 ELECTROCARDIOGRAM COMPLETE: CPT | Performed by: INTERNAL MEDICINE

## 2021-09-21 PROCEDURE — 99214 OFFICE O/P EST MOD 30 MIN: CPT | Performed by: INTERNAL MEDICINE

## 2021-09-21 RX ORDER — FUROSEMIDE 20 MG/1
20 TABLET ORAL DAILY PRN
Qty: 90 TABLET | Refills: 0
Start: 2021-09-21 | End: 2022-02-17 | Stop reason: HOSPADM

## 2021-09-21 NOTE — PROGRESS NOTES
Subjective:     Encounter Date:09/21/2021      Patient ID: Savannah Andres is a 86 y.o. female.    Chief Complaint:  History of Present Illness    This is an 86-year-old with a history of complete heart block status post dual-chamber permanent pacemaker placement on 11/2016, hypertension, cardiomyopathy with an EF of 38%, who presents for follow-up.     In 9/2020 at her last follow-up blood pressures worse in the 150s but I opted not to make any changes since the patient repeatedly reported issues with titrating her medications further.    And follow-up with me in 3/2021 she still remained hypertensive.  I recommended that she try increasing the lisinopril to 7.5 mg daily with the patient was still hesitant and reported that she would think about it.  She was last seen in the office by MERYL Miranda on 9/2/2021.  She apparently came in for an urgent follow-up due to complaints of increased thirst, confusion, fatigue and refusal to get labs performed at the [facility].  It was unclear what other issues she was having.  She was again noted to be severely hypertensive.  She finally agreed to increase her lisinopril to 7.5 mg daily.  She had a BMP performed at that office visit which was unremarkable except for mildly elevated glucose.  It was recommended that she follow-up with Dr. Del Angel in regards to her complaints of increased thirst and confusion.    She returns today for follow-up.  Her blood pressures are much better controlled with the increase in lisinopril.  She continues to complain of increased thirst and attributes some of this to the diet she receives at the [facility].  She continues to complain of issues with confusion and memory loss.  She otherwise denies any chest pain, palpitations, shortness of breath, or lower extremity edema.  She admits that she did not take her furosemide today and is wondering if this needs to be taken on a daily basis since her blood pressures are controlled off the  medication.     Prior History:  Patient was previously followed by Dr. Angel who she last saw the office in 12/2018.  At that time she was doing well and no changes were made to her management.  She was last seen in the office by MERYL Miranda in 6/2019 for routine follow-up.  At that time she was noted to have elevated blood pressures and it was recommended by both Dr. Del Angel and Liliana that she increase her lisinopril to 10 mg a day.  At that office visit she also reported increased thirst especially in the evening that she reported at that time was related to furosemide use.  At that office visit it was reported by both the patient and Dr. Angel that she had had a repeat echocardiogram since 2016 that showed normalization of her left ventricular function although a copy of that echo report is not available for review.     I saw the patient initially in 12/2019 at which time she reported that she tried increasing the lisinopril to 10 mg for about 2 or 3 days but backed off on back to the 5 mg a day.  She claimed that this was because she felt like it was causing issues with increased thirst.  She claimed that that increased thirst improved when she backed off on the lisinopril.  She also admited that she was worried that the increase in lisinopril would lead to unsteadiness which could result in falls.  Is unclear if she experience any of those issues while taking the higher dose of lisinopril.  At that office visit she reported that her systolic pressures were in the 140s-160s.  That office visit I recommended that she try increasing her lisinopril to 10 mg again.     In follow-up in 3/2020 she reported that she tried the increased dose but opted to go back to 5 mg a day after she developed increased thirst again.     Review of Systems   Constitutional: Positive for malaise/fatigue.   HENT: Negative for hearing loss, hoarse voice, nosebleeds and sore throat.    Eyes: Negative for pain.   Cardiovascular: Negative  for chest pain, claudication, cyanosis, dyspnea on exertion, irregular heartbeat, leg swelling, near-syncope, orthopnea, palpitations, paroxysmal nocturnal dyspnea and syncope.   Respiratory: Negative for shortness of breath and snoring.    Endocrine: Negative for cold intolerance, heat intolerance, polydipsia, polyphagia and polyuria.   Skin: Negative for itching and rash.   Musculoskeletal: Negative for arthritis, falls, joint pain, joint swelling, muscle cramps, muscle weakness and myalgias.   Gastrointestinal: Negative for constipation, diarrhea, dysphagia, heartburn, hematemesis, hematochezia, melena, nausea and vomiting.   Genitourinary: Negative for frequency, hematuria and hesitancy.   Neurological: Negative for excessive daytime sleepiness, dizziness, headaches, light-headedness, numbness and weakness.   Psychiatric/Behavioral: Positive for memory loss. Negative for depression. The patient is not nervous/anxious.          Current Outpatient Medications:   •  furosemide (LASIX) 20 MG tablet, TAKE ONE TABLET BY MOUTH DAILY, Disp: 90 tablet, Rfl: 0  •  lisinopril (PRINIVIL,ZESTRIL) 5 MG tablet, TAKE 1 AND 1/2 TABLET BY MOUTH DAILY, Disp: 135 tablet, Rfl: 1  •  metoprolol succinate XL (TOPROL-XL) 50 MG 24 hr tablet, TAKE ONE TABLET BY MOUTH DAILY, Disp: 90 tablet, Rfl: 1    Past Medical History:   Diagnosis Date   • Bilateral leg edema    • CHB (complete heart block) (CMS/HCC)     3rd degree   • CHF (congestive heart failure) (CMS/HCC)    • Hypertension    • Loss of consciousness (CMS/HCC)    • Skin cyst    • SOB (shortness of breath)    • Symptomatic bradycardia    • Syncope        Past Surgical History:   Procedure Laterality Date   • CARDIAC ELECTROPHYSIOLOGY PROCEDURE N/A 11/23/2016    Procedure: Pacemaker DC new  [employer];  Surgeon: Ananth Malcolm MD;  Location: [facility] INVASIVE LOCATION;  Service:    • PACEMAKER IMPLANTATION         Family History   Problem Relation Age of Onset   • Hypertension  "Mother    • Hypertension Father        Social History     Tobacco Use   • Smoking status: Never Smoker   • Smokeless tobacco: Never Used   • Tobacco comment: caffeine use   Substance Use Topics   • Alcohol use: No   • Drug use: No         ECG 12 Lead    Date/Time: 9/21/2021 4:21 PM  Performed by: Alia Dow MD  Authorized by: Alia Dow MD   Comparison: compared with previous ECG   Similar to previous ECG  Pacing: atrial sensed rhythm and ventricular paced rhythm             Objective:     Visit Vitals  /92 (BP Location: Right arm, Patient Position: Sitting, Cuff Size: Pediatric)   Pulse 89   Ht 157.5 cm (62\")   Wt 42.9 kg (94 lb 9.6 oz)   SpO2 98%   BMI 17.30 kg/m²         Constitutional:       Appearance: Normal appearance. Well-developed.   Eyes:      General: Lids are normal.      Conjunctiva/sclera: Conjunctivae normal.      Pupils: Pupils are equal, round, and reactive to light.   HENT:      Head: Normocephalic and atraumatic.   Neck:      Vascular: No carotid bruit or JVD.      Lymphadenopathy: No cervical adenopathy.   Pulmonary:      Effort: Pulmonary effort is normal.      Breath sounds: Normal breath sounds.   Cardiovascular:      Normal rate. Regular rhythm.      No gallop.   Pulses:     Radial: 2+ bilaterally.  Edema:     Peripheral edema absent.   Abdominal:      Palpations: Abdomen is soft.   Musculoskeletal:      Cervical back: Full passive range of motion without pain, normal range of motion and neck supple. Skin:     General: Skin is warm and dry.   Neurological:      Mental Status: Alert and oriented to person, place, and time.             Assessment:          Diagnosis Plan   1. Presence of permanent cardiac pacemaker     2. Third degree heart block (CMS/HCC)     3. Cardiomyopathy, unspecified type (CMS/HCC)     4. Essential hypertension            Plan:       1.  Hypertension.  Better controlled today.  I think is reasonable for her to take her furosemide as needed.  I did ask " her to continue taking lisinopril to 7.5 mg dosage along with the current dose of metoprolol succinate.  2.  Increased thirst.  This is been a chronic complaint for the patient.  Although I do not think it will make much of a difference we will see if backing off on the furosemide will help her symptoms.  3.  Cardiomyopathy.  EF of about 38% in 2016.  Reportedly had a repeat echocardiogram that showed normalization but I do not have any records to indicate this.  Regardless she is doing well on guideline directed management and no evidence of significant volume overload.  We will see how she does off the furosemide.  4.  Complete heart block status post dual-chamber permanent pacemaker placement.  Underwent device interrogation today with normal function and no recent events.  Continue routine device checks.  5.  Confusion/memory loss.  Recommended follow-up with Dr. Del Angel as planned.    I will plan on seeing the patient back again in 3 months.

## 2021-11-24 RX ORDER — METOPROLOL SUCCINATE 50 MG/1
50 TABLET, EXTENDED RELEASE ORAL DAILY
Qty: 90 TABLET | Refills: 1 | Status: SHIPPED | OUTPATIENT
Start: 2021-11-24

## 2022-01-18 PROCEDURE — 99284 EMERGENCY DEPT VISIT MOD MDM: CPT

## 2022-01-19 ENCOUNTER — APPOINTMENT (OUTPATIENT)
Dept: GENERAL RADIOLOGY | Facility: HOSPITAL | Age: 87
End: 2022-01-19

## 2022-01-19 ENCOUNTER — HOSPITAL ENCOUNTER (OUTPATIENT)
Facility: HOSPITAL | Age: 87
Setting detail: OBSERVATION
Discharge: NURSING, REHAB OR LONG-TERM CARE | End: 2022-01-20
Attending: EMERGENCY MEDICINE | Admitting: EMERGENCY MEDICINE

## 2022-01-19 DIAGNOSIS — E87.6 HYPOKALEMIA DUE TO EXCESSIVE RENAL LOSS OF POTASSIUM: Primary | ICD-10-CM

## 2022-01-19 DIAGNOSIS — U07.1 COVID-19: ICD-10-CM

## 2022-01-19 LAB
ALBUMIN SERPL-MCNC: 3.4 G/DL (ref 3.5–5.2)
ALBUMIN/GLOB SERPL: 1.8 G/DL
ALP SERPL-CCNC: 73 U/L (ref 39–117)
ALT SERPL W P-5'-P-CCNC: 10 U/L (ref 1–33)
ANION GAP SERPL CALCULATED.3IONS-SCNC: 10 MMOL/L (ref 5–15)
ANION GAP SERPL CALCULATED.3IONS-SCNC: 12 MMOL/L (ref 5–15)
ANION GAP SERPL CALCULATED.3IONS-SCNC: 9 MMOL/L (ref 5–15)
AST SERPL-CCNC: 16 U/L (ref 1–32)
BACTERIA UR QL AUTO: ABNORMAL /HPF
BASOPHILS # BLD AUTO: 0.01 10*3/MM3 (ref 0–0.2)
BASOPHILS NFR BLD AUTO: 0.3 % (ref 0–1.5)
BILIRUB SERPL-MCNC: 0.5 MG/DL (ref 0–1.2)
BILIRUB UR QL STRIP: NEGATIVE
BUN SERPL-MCNC: 12 MG/DL (ref 8–23)
BUN SERPL-MCNC: 12 MG/DL (ref 8–23)
BUN SERPL-MCNC: 15 MG/DL (ref 8–23)
BUN/CREAT SERPL: 20 (ref 7–25)
BUN/CREAT SERPL: 20.7 (ref 7–25)
BUN/CREAT SERPL: 27.3 (ref 7–25)
CALCIUM SPEC-SCNC: 7.1 MG/DL (ref 8.6–10.5)
CALCIUM SPEC-SCNC: 8 MG/DL (ref 8.6–10.5)
CALCIUM SPEC-SCNC: 8 MG/DL (ref 8.6–10.5)
CHLORIDE SERPL-SCNC: 100 MMOL/L (ref 98–107)
CHLORIDE SERPL-SCNC: 103 MMOL/L (ref 98–107)
CHLORIDE SERPL-SCNC: 103 MMOL/L (ref 98–107)
CLARITY UR: CLEAR
CO2 SERPL-SCNC: 26 MMOL/L (ref 22–29)
CO2 SERPL-SCNC: 29 MMOL/L (ref 22–29)
CO2 SERPL-SCNC: 30 MMOL/L (ref 22–29)
COLOR UR: YELLOW
CREAT SERPL-MCNC: 0.55 MG/DL (ref 0.57–1)
CREAT SERPL-MCNC: 0.58 MG/DL (ref 0.57–1)
CREAT SERPL-MCNC: 0.6 MG/DL (ref 0.57–1)
DEPRECATED RDW RBC AUTO: 40.6 FL (ref 37–54)
EOSINOPHIL # BLD AUTO: 0 10*3/MM3 (ref 0–0.4)
EOSINOPHIL NFR BLD AUTO: 0 % (ref 0.3–6.2)
ERYTHROCYTE [DISTWIDTH] IN BLOOD BY AUTOMATED COUNT: 12.3 % (ref 12.3–15.4)
ETHANOL BLD-MCNC: <10 MG/DL (ref 0–10)
ETHANOL UR QL: <0.01 %
GFR SERPL CREATININE-BSD FRML MDRD: 105 ML/MIN/1.73
GFR SERPL CREATININE-BSD FRML MDRD: 95 ML/MIN/1.73
GFR SERPL CREATININE-BSD FRML MDRD: 99 ML/MIN/1.73
GLOBULIN UR ELPH-MCNC: 1.9 GM/DL
GLUCOSE SERPL-MCNC: 119 MG/DL (ref 65–99)
GLUCOSE SERPL-MCNC: 134 MG/DL (ref 65–99)
GLUCOSE SERPL-MCNC: 136 MG/DL (ref 65–99)
GLUCOSE UR STRIP-MCNC: NEGATIVE MG/DL
HCT VFR BLD AUTO: 39.7 % (ref 34–46.6)
HGB BLD-MCNC: 13 G/DL (ref 12–15.9)
HGB UR QL STRIP.AUTO: NEGATIVE
HOLD SPECIMEN: NORMAL
HOLD SPECIMEN: NORMAL
HYALINE CASTS UR QL AUTO: ABNORMAL /LPF
IMM GRANULOCYTES # BLD AUTO: 0.01 10*3/MM3 (ref 0–0.05)
IMM GRANULOCYTES NFR BLD AUTO: 0.3 % (ref 0–0.5)
KETONES UR QL STRIP: ABNORMAL
LEUKOCYTE ESTERASE UR QL STRIP.AUTO: ABNORMAL
LYMPHOCYTES # BLD AUTO: 0.69 10*3/MM3 (ref 0.7–3.1)
LYMPHOCYTES NFR BLD AUTO: 18.6 % (ref 19.6–45.3)
MAGNESIUM SERPL-MCNC: 1.9 MG/DL (ref 1.6–2.4)
MCH RBC QN AUTO: 29.6 PG (ref 26.6–33)
MCHC RBC AUTO-ENTMCNC: 32.7 G/DL (ref 31.5–35.7)
MCV RBC AUTO: 90.4 FL (ref 79–97)
MONOCYTES # BLD AUTO: 0.35 10*3/MM3 (ref 0.1–0.9)
MONOCYTES NFR BLD AUTO: 9.5 % (ref 5–12)
NEUTROPHILS NFR BLD AUTO: 2.64 10*3/MM3 (ref 1.7–7)
NEUTROPHILS NFR BLD AUTO: 71.3 % (ref 42.7–76)
NITRITE UR QL STRIP: NEGATIVE
NRBC BLD AUTO-RTO: 0 /100 WBC (ref 0–0.2)
PH UR STRIP.AUTO: 6 [PH] (ref 5–8)
PLATELET # BLD AUTO: 146 10*3/MM3 (ref 140–450)
PMV BLD AUTO: 10.9 FL (ref 6–12)
POTASSIUM SERPL-SCNC: 2.3 MMOL/L (ref 3.5–5.2)
POTASSIUM SERPL-SCNC: 3.1 MMOL/L (ref 3.5–5.2)
POTASSIUM SERPL-SCNC: 3.9 MMOL/L (ref 3.5–5.2)
PROT SERPL-MCNC: 5.3 G/DL (ref 6–8.5)
PROT UR QL STRIP: ABNORMAL
QT INTERVAL: 474 MS
RBC # BLD AUTO: 4.39 10*6/MM3 (ref 3.77–5.28)
RBC # UR STRIP: ABNORMAL /HPF
REF LAB TEST METHOD: ABNORMAL
SARS-COV-2 RNA RESP QL NAA+PROBE: DETECTED
SODIUM SERPL-SCNC: 139 MMOL/L (ref 136–145)
SODIUM SERPL-SCNC: 139 MMOL/L (ref 136–145)
SODIUM SERPL-SCNC: 144 MMOL/L (ref 136–145)
SP GR UR STRIP: 1.02 (ref 1–1.03)
SQUAMOUS #/AREA URNS HPF: ABNORMAL /HPF
UROBILINOGEN UR QL STRIP: ABNORMAL
WBC # UR STRIP: ABNORMAL /HPF
WBC NRBC COR # BLD: 3.7 10*3/MM3 (ref 3.4–10.8)
WHOLE BLOOD HOLD SPECIMEN: NORMAL
WHOLE BLOOD HOLD SPECIMEN: NORMAL

## 2022-01-19 PROCEDURE — 96366 THER/PROPH/DIAG IV INF ADDON: CPT

## 2022-01-19 PROCEDURE — G0378 HOSPITAL OBSERVATION PER HR: HCPCS

## 2022-01-19 PROCEDURE — 80053 COMPREHEN METABOLIC PANEL: CPT | Performed by: PHYSICIAN ASSISTANT

## 2022-01-19 PROCEDURE — 96367 TX/PROPH/DG ADDL SEQ IV INF: CPT

## 2022-01-19 PROCEDURE — 83735 ASSAY OF MAGNESIUM: CPT | Performed by: NURSE PRACTITIONER

## 2022-01-19 PROCEDURE — 93010 ELECTROCARDIOGRAM REPORT: CPT | Performed by: INTERNAL MEDICINE

## 2022-01-19 PROCEDURE — 96365 THER/PROPH/DIAG IV INF INIT: CPT

## 2022-01-19 PROCEDURE — 25010000002 MAGNESIUM SULFATE 2 GM/50ML SOLUTION: Performed by: NURSE PRACTITIONER

## 2022-01-19 PROCEDURE — 87086 URINE CULTURE/COLONY COUNT: CPT | Performed by: PHYSICIAN ASSISTANT

## 2022-01-19 PROCEDURE — 85025 COMPLETE CBC W/AUTO DIFF WBC: CPT | Performed by: PHYSICIAN ASSISTANT

## 2022-01-19 PROCEDURE — U0003 INFECTIOUS AGENT DETECTION BY NUCLEIC ACID (DNA OR RNA); SEVERE ACUTE RESPIRATORY SYNDROME CORONAVIRUS 2 (SARS-COV-2) (CORONAVIRUS DISEASE [COVID-19]), AMPLIFIED PROBE TECHNIQUE, MAKING USE OF HIGH THROUGHPUT TECHNOLOGIES AS DESCRIBED BY CMS-2020-01-R: HCPCS | Performed by: EMERGENCY MEDICINE

## 2022-01-19 PROCEDURE — 81001 URINALYSIS AUTO W/SCOPE: CPT | Performed by: PHYSICIAN ASSISTANT

## 2022-01-19 PROCEDURE — 82077 ASSAY SPEC XCP UR&BREATH IA: CPT | Performed by: PHYSICIAN ASSISTANT

## 2022-01-19 PROCEDURE — 71045 X-RAY EXAM CHEST 1 VIEW: CPT

## 2022-01-19 PROCEDURE — 0 POTASSIUM CHLORIDE 10 MEQ/100ML SOLUTION: Performed by: NURSE PRACTITIONER

## 2022-01-19 PROCEDURE — U0005 INFEC AGEN DETEC AMPLI PROBE: HCPCS | Performed by: EMERGENCY MEDICINE

## 2022-01-19 PROCEDURE — 93005 ELECTROCARDIOGRAM TRACING: CPT | Performed by: NURSE PRACTITIONER

## 2022-01-19 PROCEDURE — 0 POTASSIUM CHLORIDE 10 MEQ/100ML SOLUTION: Performed by: EMERGENCY MEDICINE

## 2022-01-19 RX ORDER — POTASSIUM CHLORIDE 7.45 MG/ML
10 INJECTION INTRAVENOUS ONCE
Status: COMPLETED | OUTPATIENT
Start: 2022-01-19 | End: 2022-01-19

## 2022-01-19 RX ORDER — MAGNESIUM SULFATE HEPTAHYDRATE 40 MG/ML
2 INJECTION, SOLUTION INTRAVENOUS ONCE
Status: COMPLETED | OUTPATIENT
Start: 2022-01-19 | End: 2022-01-19

## 2022-01-19 RX ORDER — POTASSIUM CHLORIDE 750 MG/1
10 TABLET, EXTENDED RELEASE ORAL DAILY
Qty: 30 TABLET | Refills: 0 | Status: SHIPPED | OUTPATIENT
Start: 2022-01-19 | End: 2022-02-17 | Stop reason: HOSPADM

## 2022-01-19 RX ORDER — SODIUM CHLORIDE 0.9 % (FLUSH) 0.9 %
10 SYRINGE (ML) INJECTION EVERY 12 HOURS SCHEDULED
Status: DISCONTINUED | OUTPATIENT
Start: 2022-01-19 | End: 2022-01-20 | Stop reason: HOSPADM

## 2022-01-19 RX ORDER — METOPROLOL SUCCINATE 50 MG/1
50 TABLET, EXTENDED RELEASE ORAL DAILY
Status: DISCONTINUED | OUTPATIENT
Start: 2022-01-19 | End: 2022-01-20 | Stop reason: HOSPADM

## 2022-01-19 RX ORDER — NITROGLYCERIN 0.4 MG/1
0.4 TABLET SUBLINGUAL
Status: DISCONTINUED | OUTPATIENT
Start: 2022-01-19 | End: 2022-01-20 | Stop reason: HOSPADM

## 2022-01-19 RX ORDER — ONDANSETRON 2 MG/ML
4 INJECTION INTRAMUSCULAR; INTRAVENOUS EVERY 6 HOURS PRN
Status: DISCONTINUED | OUTPATIENT
Start: 2022-01-19 | End: 2022-01-20 | Stop reason: HOSPADM

## 2022-01-19 RX ORDER — ONDANSETRON 4 MG/1
4 TABLET, FILM COATED ORAL EVERY 6 HOURS PRN
Status: DISCONTINUED | OUTPATIENT
Start: 2022-01-19 | End: 2022-01-20 | Stop reason: HOSPADM

## 2022-01-19 RX ORDER — POTASSIUM CHLORIDE 750 MG/1
40 TABLET, FILM COATED, EXTENDED RELEASE ORAL ONCE
Status: COMPLETED | OUTPATIENT
Start: 2022-01-19 | End: 2022-01-19

## 2022-01-19 RX ORDER — SODIUM CHLORIDE 0.9 % (FLUSH) 0.9 %
10 SYRINGE (ML) INJECTION AS NEEDED
Status: DISCONTINUED | OUTPATIENT
Start: 2022-01-19 | End: 2022-01-20 | Stop reason: HOSPADM

## 2022-01-19 RX ORDER — FUROSEMIDE 20 MG/1
20 TABLET ORAL DAILY PRN
Status: DISCONTINUED | OUTPATIENT
Start: 2022-01-19 | End: 2022-01-20 | Stop reason: HOSPADM

## 2022-01-19 RX ADMIN — POTASSIUM CHLORIDE 10 MEQ: 7.46 INJECTION, SOLUTION INTRAVENOUS at 08:44

## 2022-01-19 RX ADMIN — LISINOPRIL 7.5 MG: 5 TABLET ORAL at 17:25

## 2022-01-19 RX ADMIN — POTASSIUM CHLORIDE 40 MEQ: 10 TABLET, EXTENDED RELEASE ORAL at 13:16

## 2022-01-19 RX ADMIN — POTASSIUM CHLORIDE 10 MEQ: 7.46 INJECTION, SOLUTION INTRAVENOUS at 13:13

## 2022-01-19 RX ADMIN — MAGNESIUM SULFATE HEPTAHYDRATE 2 G: 2 INJECTION, SOLUTION INTRAVENOUS at 10:38

## 2022-01-19 RX ADMIN — POTASSIUM CHLORIDE 40 MEQ: 750 TABLET, EXTENDED RELEASE ORAL at 09:30

## 2022-01-19 RX ADMIN — Medication 10 ML: at 21:39

## 2022-01-19 RX ADMIN — METOPROLOL SUCCINATE 50 MG: 50 TABLET, EXTENDED RELEASE ORAL at 13:15

## 2022-01-19 NOTE — H&P
[facility]   HISTORY AND PHYSICAL    Patient Name: Savannah Andres  : 1935  MRN: [id number]  Primary Care Physician:  Vasu Del Angel MD  Date of admission: 2022    Subjective   Subjective     Chief Complaint:   Chief Complaint   Patient presents with   • Agitation         HPI:    Savannah Andres is a pleasant afebrile 86 y.o.  female has been admitted to the observation unit for hypokalemia.  She does endorse 3 episodes of muscle cramps today.  She is a vague historian, this is likely secondary to history of reported dementia.  She was brought to the emergency department today from her nursing facility due to agitated behavior.  The patient does not recall the incident but states she was annoyed with nursing staff so she sat on the floor.  She tells me that she has poor memory.  She denies hitting her head or loss of consciousness.  No chest pain or near syncopal symptoms.  She has history of congestive heart failure for which she takes Lasix 20 mg daily, she is not on any potassium supplementation at home.  She tested positive for COVID-19 approximately 4 days ago.  She denies any chest pain, shortness of breath.  Reports she is vaccinated for COVID.  Her second repeat BMP shows an improvement of her potassium level in the normal range.  Will discharge back to nursing facility.      Review of Systems   All systems were reviewed and negative except for: hypokalemia    Personal History     Past Medical History:   Diagnosis Date   • Bilateral leg edema    • CHB (complete heart block) (CMS/HCC)     3rd degree   • CHF (congestive heart failure) (CMS/HCC)    • Hypertension    • Loss of consciousness (CMS/HCC)    • Skin cyst    • SOB (shortness of breath)    • Symptomatic bradycardia    • Syncope        Past Surgical History:   Procedure Laterality Date   • CARDIAC ELECTROPHYSIOLOGY PROCEDURE N/A 2016    Procedure: Pacemaker DC new  [employer];  Surgeon: Ananth Malcolm MD;  Location: [facility]  CATH INVASIVE LOCATION;  Service:    • PACEMAKER IMPLANTATION         Family History: family history includes Hypertension in her father and mother. Otherwise pertinent FHx was reviewed and not pertinent to current issue.    Social History:  reports that she has never smoked. She has never used smokeless tobacco. She reports that she does not drink alcohol and does not use drugs.    Home Medications:  furosemide, lisinopril, and metoprolol succinate XL    Allergies:  No Known Allergies    Objective   Objective     Vitals:   Temp:  [98.1 °F (36.7 °C)-98.3 °F (36.8 °C)] 98.1 °F (36.7 °C)  Heart Rate:  [60-62] 62  Resp:  [18] 18  BP: (141-154)/(69-90) 141/90  Physical Exam    Constitutional: Awake, alert, chronically ill-appearing   eyes: PERRLA, sclerae anicteric, no conjunctival injection   HENT: NCAT, mucous membranes moist   Neck: Supple, no thyromegaly, no lymphadenopathy, trachea midline   Respiratory: Clear to auscultation bilaterally, nonlabored respirations    Cardiovascular: RRR, no murmurs, rubs, or gallops, palpable pedal pulses bilaterally   Gastrointestinal: Positive bowel sounds, soft, nontender, nondistended   Musculoskeletal: No bilateral ankle edema, no clubbing or cyanosis to extremities   Psychiatric: Appropriate affect, cooperative   Neurologic: Oriented x 3, strength symmetric in all extremities, Cranial Nerves grossly intact to confrontation, speech clear   Skin: No rashes     Result Review    Result Review:  I have personally reviewed the results from the time of this admission to 1/19/2022 08:56 EST and agree with these findings:  [x]  Laboratory  []  Microbiology  [x]  Radiology  []  EKG/Telemetry   []  Cardiology/Vascular   []  Pathology  []  Old records  []  Other:  Most notable findings include: Potassium 2.3, magnesium 1.9, COVID-19 positive    Assessment/Plan   Assessment / Plan     Brief Patient Summary:  Savannah Andres is a 86 y.o. female who is being evaluated and treated for  hypokalemia.    Active Hospital Problems:  Active Hospital Problems    Diagnosis    • Hypokalemia      Plan:     Hypokalemia  -repeat BMP shows resolved hypokalemia.    - supplemental mag infusion to help with absorption  -Plan to discharge with prescription for 10 mEq of potassium supplement to take with her Lasix daily    Covid 19+  -pulse oximetry    Hypertension  -continue home meds  -Monitor      DVT prophylaxis:  Mechanical DVT prophylaxis orders are present.    CODE STATUS:    Level Of Support Discussed With: Patient  Code Status (Patient has no pulse and is not breathing): CPR (Attempt to Resuscitate)  Medical Interventions (Patient has pulse or is breathing): Full Support    Admission Status:  I believe this patient meets observation status.    Electronically signed by MERYL Lackey, 01/19/22, 8:56 AM EST.         I have worn appropriate PPE during this patient encounter, sanitized my hands both with entering and exiting patient's room.    This note serves as a discharge summary

## 2022-01-19 NOTE — NURSING NOTE
Spoke with patient's daughter, Savannah Andres. Stated that patient does not use a cane or walker to ambulate. When asked how patient gets around to doctors appointments daughter stated that recently [facility] facility has been transporting patient since patient is not vaccinated against COVID.

## 2022-01-19 NOTE — ED TRIAGE NOTES
Pt arrives via EMS from [facility]. Pt was speaking with staff when she was not able to reach her family member by phone she sat on the ground and would not respond to staff. Pt did not have LOC or increased confusion. Upon EMS arrival pt was obviously anxious. Pt now responding and is back to reported baseline    Pt tested Covid + 4 days ago    Pt masked on arrival, staff masked    Run# [id number]

## 2022-01-19 NOTE — ED PROVIDER NOTES
EMERGENCY DEPARTMENT ENCOUNTER  I wore full protective equipment throughout this patient encounter including a N95 mask, eye shield, gown and gloves. Hand hygiene was performed before donning protective equipment and after removal when leaving the room.    Room Number:  114/1  Date of encounter:  1/19/2022  PCP: Vasu Del Angel MD   History is limited secondary to patient's impaired memory.      HPI:  Context: Savannah Andres is a 86 y.o. female who presents to the ED c/o chief complaint of anxiety.  According to EMS, patient was speaking with staff and she was not able to reach her family by phone.  She reportedly sat on the ground and would not respond to staff because she was upset.  Patient denies hitting her head or having loss consciousness.  She denies injury.  Patient reportedly tested positive for SARS-CoV-2 four days ago.  Patient denies headache, general body aches, diarrhea or shortness of air.  Patient states she feels hungry.    MEDICAL HISTORY REVIEW  Reviewed in EPIC.  Patient's potassium was 4.2 four months ago.  Reviewing the records in the computer, patient is on Lasix, lisinopril and metoprolol.  She does not appear to be on potassium.    PAST MEDICAL HISTORY  Active Ambulatory Problems     Diagnosis Date Noted   • Third degree heart block (HCC) 11/22/2016   • CHF (congestive heart failure), NYHA class III ([facility]) 11/30/2016   • Hyperglycemia 12/12/2016   • Hypokalemia 12/12/2016   • Acute midline low back pain without sciatica 01/22/2018   • Inflamed sebaceous cyst 08/30/2018   • Hypertension 06/04/2019   • Presence of permanent cardiac pacemaker 06/04/2019   • Valvular heart disease 06/04/2019   • Pulmonary hypertension (HCC) 06/20/2019   • Hordeolum externum of right lower eyelid 11/15/2019   • Cardiomyopathy ([facility]) 12/03/2019     Resolved Ambulatory Problems     Diagnosis Date Noted   • Dysuria 01/16/2017   • Acute cystitis with hematuria 01/16/2017   • Viral enteritis 02/14/2017     Past  Medical History:   Diagnosis Date   • Bilateral leg edema    • CHB (complete heart block) (HCC)    • CHF (congestive heart failure) (HCC)    • Loss of consciousness (HCC)    • Skin cyst    • SOB (shortness of breath)    • Symptomatic bradycardia    • Syncope        PAST SURGICAL HISTORY  Past Surgical History:   Procedure Laterality Date   • CARDIAC ELECTROPHYSIOLOGY PROCEDURE N/A 11/23/2016    Procedure: Pacemaker DC new  [employer];  Surgeon: Ananth Malcolm MD;  Location: [facility] INVASIVE LOCATION;  Service:    • PACEMAKER IMPLANTATION         FAMILY HISTORY  Family History   Problem Relation Age of Onset   • Hypertension Mother    • Hypertension Father        SOCIAL HISTORY  Social History     Socioeconomic History   • Marital status: Single   • Number of children: 3   Tobacco Use   • Smoking status: Never Smoker   • Smokeless tobacco: Never Used   • Tobacco comment: caffeine use   Substance and Sexual Activity   • Alcohol use: No   • Drug use: No   • Sexual activity: Defer       ALLERGIES  Patient has no known allergies.    The patient's allergies have been reviewed    REVIEW OF SYSTEMS  All systems reviewed and negative except for those discussed in HPI.     PHYSICAL EXAM  I have reviewed the triage vital signs and nursing notes.  ED Triage Vitals [01/18/22 2008]   Temp Heart Rate Resp BP SpO2   98.3 °F (36.8 °C) 60 18 153/79 100 %      Temp src Heart Rate Source Patient Position BP Location FiO2 (%)   Tympanic Monitor -- -- --       General: Mild distress.  HENT: NCAT, PERRL, Nares patent.  Eyes: no scleral icterus.  Neck: trachea midline, no ROM limitations.  CV: regular rhythm, regular rate.  Respiratory: normal effort, CTAB.  Abdomen: soft, nondistended, NTTP, no rebound tenderness, no guarding or rigidity.  Musculoskeletal: no deformity.  Neuro: alert, moves all extremities, follows commands.  Skin: warm, dry.  Patellar reflexes are 2+ bilaterally.    LAB RESULTS  Recent Results (from the past 24  hour(s))   Ethanol    Collection Time: 01/19/22 12:16 AM    Specimen: Blood   Result Value Ref Range    Ethanol <10 0 - 10 mg/dL    Ethanol % <0.010 %   Comprehensive Metabolic Panel    Collection Time: 01/19/22 12:16 AM    Specimen: Blood   Result Value Ref Range    Glucose 134 (H) 65 - 99 mg/dL    BUN 12 8 - 23 mg/dL    Creatinine 0.60 0.57 - 1.00 mg/dL    Sodium 144 136 - 145 mmol/L    Potassium 2.3 (C) 3.5 - 5.2 mmol/L    Chloride 103 98 - 107 mmol/L    CO2 29.0 22.0 - 29.0 mmol/L    Calcium 7.1 (L) 8.6 - 10.5 mg/dL    Total Protein 5.3 (L) 6.0 - 8.5 g/dL    Albumin 3.40 (L) 3.50 - 5.20 g/dL    ALT (SGPT) 10 1 - 33 U/L    AST (SGOT) 16 1 - 32 U/L    Alkaline Phosphatase 73 39 - 117 U/L    Total Bilirubin 0.5 0.0 - 1.2 mg/dL    eGFR Non African Amer 95 >60 mL/min/1.73    Globulin 1.9 gm/dL    A/G Ratio 1.8 g/dL    BUN/Creatinine Ratio 20.0 7.0 - 25.0    Anion Gap 12.0 5.0 - 15.0 mmol/L   Green Top (Gel)    Collection Time: 01/19/22 12:16 AM   Result Value Ref Range    Extra Tube Hold for add-ons.    Lavender Top    Collection Time: 01/19/22 12:16 AM   Result Value Ref Range    Extra Tube hold for add-on    Gold Top - SST    Collection Time: 01/19/22 12:16 AM   Result Value Ref Range    Extra Tube Hold for add-ons.    Light Blue Top    Collection Time: 01/19/22 12:16 AM   Result Value Ref Range    Extra Tube hold for add-on    CBC Auto Differential    Collection Time: 01/19/22 12:16 AM    Specimen: Blood   Result Value Ref Range    WBC 3.70 3.40 - 10.80 10*3/mm3    RBC 4.39 3.77 - 5.28 10*6/mm3    Hemoglobin 13.0 12.0 - 15.9 g/dL    Hematocrit 39.7 34.0 - 46.6 %    MCV 90.4 79.0 - 97.0 fL    MCH 29.6 26.6 - 33.0 pg    MCHC 32.7 31.5 - 35.7 g/dL    RDW 12.3 12.3 - 15.4 %    RDW-SD 40.6 37.0 - 54.0 fl    MPV 10.9 6.0 - 12.0 fL    Platelets 146 140 - 450 10*3/mm3    Neutrophil % 71.3 42.7 - 76.0 %    Lymphocyte % 18.6 (L) 19.6 - 45.3 %    Monocyte % 9.5 5.0 - 12.0 %    Eosinophil % 0.0 (L) 0.3 - 6.2 %    Basophil %  0.3 0.0 - 1.5 %    Immature Grans % 0.3 0.0 - 0.5 %    Neutrophils, Absolute 2.64 1.70 - 7.00 10*3/mm3    Lymphocytes, Absolute 0.69 (L) 0.70 - 3.10 10*3/mm3    Monocytes, Absolute 0.35 0.10 - 0.90 10*3/mm3    Eosinophils, Absolute 0.00 0.00 - 0.40 10*3/mm3    Basophils, Absolute 0.01 0.00 - 0.20 10*3/mm3    Immature Grans, Absolute 0.01 0.00 - 0.05 10*3/mm3    nRBC 0.0 0.0 - 0.2 /100 WBC   ECG 12 Lead    Collection Time: 01/19/22  7:51 AM   Result Value Ref Range    QT Interval 474 ms   Magnesium    Collection Time: 01/19/22  8:00 AM    Specimen: Arm, Right; Blood   Result Value Ref Range    Magnesium 1.9 1.6 - 2.4 mg/dL   COVID-19,BH POPPY IN-HOUSE CEPHEID/ROME NP SWAB IN TRANSPORT MEDIA 8-12 HR TAT - Swab, Nasopharynx    Collection Time: 01/19/22  8:00 AM    Specimen: Nasopharynx; Swab   Result Value Ref Range    COVID19 Detected (C) Not Detected - Ref. Range   Urinalysis With Culture If Indicated - Urine, Clean Catch    Collection Time: 01/19/22  9:05 AM    Specimen: Urine, Clean Catch   Result Value Ref Range    Color, UA Yellow Yellow, Straw    Appearance, UA Clear Clear    pH, UA 6.0 5.0 - 8.0    Specific Gravity, UA 1.016 1.005 - 1.030    Glucose, UA Negative Negative    Ketones, UA 15 mg/dL (1+) (A) Negative    Bilirubin, UA Negative Negative    Blood, UA Negative Negative    Protein, UA Trace (A) Negative    Leuk Esterase, UA Moderate (2+) (A) Negative    Nitrite, UA Negative Negative    Urobilinogen, UA 1.0 E.U./dL 0.2 - 1.0 E.U./dL   Urinalysis, Microscopic Only - Urine, Clean Catch    Collection Time: 01/19/22  9:05 AM    Specimen: Urine, Clean Catch   Result Value Ref Range    RBC, UA 0-2 None Seen, 0-2 /HPF    WBC, UA 21-30 (A) None Seen, 0-2 /HPF    Bacteria, UA 1+ (A) None Seen /HPF    Squamous Epithelial Cells, UA 0-2 None Seen, 0-2 /HPF    Hyaline Casts, UA 7-12 None Seen /LPF    Methodology Automated Microscopy    Basic Metabolic Panel    Collection Time: 01/19/22 12:05 PM    Specimen: Blood    Result Value Ref Range    Glucose 136 (H) 65 - 99 mg/dL    BUN 15 8 - 23 mg/dL    Creatinine 0.55 (L) 0.57 - 1.00 mg/dL    Sodium 139 136 - 145 mmol/L    Potassium 3.1 (L) 3.5 - 5.2 mmol/L    Chloride 100 98 - 107 mmol/L    CO2 30.0 (H) 22.0 - 29.0 mmol/L    Calcium 8.0 (L) 8.6 - 10.5 mg/dL    eGFR Non African Amer 105 >60 mL/min/1.73    BUN/Creatinine Ratio 27.3 (H) 7.0 - 25.0    Anion Gap 9.0 5.0 - 15.0 mmol/L       I ordered the above labs and reviewed the results.    RADIOLOGY  XR Chest AP    Result Date: 1/19/2022  PORTABLE CHEST 01/19/2022 AT 0758 HOURS  CLINICAL HISTORY: Cough. Fever. COVID evaluation.  Compared to the previous chest dated 11/20/2016.  The lungs are well-expanded and appear free of infiltrates. There are no pleural effusions. The heart is mildly enlarged. A dual-chamber pacemaker is in place in satisfactory position in the left subclavian vein.  IMPRESSIONS: No evidence of acute disease within the chest.  This report was finalized on 1/19/2022 8:39 AM by Dr. Cristino Man M.D.        I ordered the above noted radiological studies. I reviewed the images and results. I agree with the radiologist interpretation.    PROCEDURES  Procedures    MEDICATIONS GIVEN IN ER  Medications   nitroglycerin (NITROSTAT) SL tablet 0.4 mg (has no administration in time range)   sodium chloride 0.9 % flush 10 mL ( Intravenous Canceled Entry 1/19/22 1001)   sodium chloride 0.9 % flush 10 mL (has no administration in time range)   ondansetron (ZOFRAN) tablet 4 mg (has no administration in time range)     Or   ondansetron (ZOFRAN) injection 4 mg (has no administration in time range)   furosemide (LASIX) tablet 20 mg (has no administration in time range)   lisinopril (PRINIVIL,ZESTRIL) tablet 7.5 mg (has no administration in time range)   metoprolol succinate XL (TOPROL-XL) 24 hr tablet 50 mg (50 mg Oral Given 1/19/22 1315)   potassium chloride 10 mEq in 100 mL IVPB (0 mEq Intravenous Stopped 1/19/22 1027)    potassium chloride (K-DUR,KLOR-CON) ER tablet 40 mEq (40 mEq Oral Given 1/19/22 0930)   magnesium sulfate 2g/50 mL (PREMIX) infusion (0 g Intravenous Stopped 1/19/22 1250)   potassium chloride (K-DUR,KLOR-CON) ER tablet 40 mEq (40 mEq Oral Given 1/19/22 1316)   potassium chloride 10 mEq in 100 mL IVPB (10 mEq Intravenous New Bag 1/19/22 1313)       PROGRESS, DATA ANALYSIS, CONSULTS, AND MEDICAL DECISION MAKING  A complete history and physical exam have been performed.  All available laboratory and imaging results have been reviewed by myself prior to disposition.    MDM  After the initial H&P, I discussed pertinent information from history and physical exam with patient/family.  Discussed differential diagnosis.  Discussed plan for ED evaluation/work-up/treatment.  All questions answered.  Patient/family is agreeable with plan.  ED Course as of 01/19/22 1419   Wed Jan 19, 2022   0751 Patient presents with feeling upset.  Patient states she has COVID-19 and EMS reports patient has a +4 days ago.  Her body chemistries show that her potassium was 2.3.  We will check an EKG, chest x-ray and magnesium.  We will also swab patient again for SARS-CoV-2.  Given that patient's potassium is dangerously low, patient will be admitted to the hospital. [DE]   0752 Potassium(!!): 2.3 [DE]   0752 Creatinine: 0.60 [DE]   0752 BUN: 12 [DE]   0752 Ethanol %: <0.010 [DE]   0752 WBC: 3.70 [DE]   0752 Hemoglobin: 13.0 [DE]   0752 Hematocrit: 39.7 [DE]   0807 EKG          EKG time: 0751  Rhythm/Rate: AV paced rhythm, rate 75    Interpreted Contemporaneously by me, independently viewed  unchanged compared to prior 11/30/2016   [DE]   0845 Patient's chest x-ray is normal.  Patient has iatrogenic hypokalemia secondary to diuretic use without supplemental potassium.  Unfortunately, patient's potassium is dangerously low so I will consult the hospitalist for observation. [DE]   0849 I discussed the case with MERYL Lackey for Dr. Cali  with the observation unit.  They are aware that patient tested positive for SARS-CoV-2 4 days ago but has no symptoms.  They are accepting patient into the observation unit for treatment of her hypokalemia. [DE]      ED Course User Index  [DE] Jeevan Manuel MD       AS OF 14:19 EST VITALS:    BP - 144/65  HR - 60  TEMP - 97.9 °F (36.6 °C) (Oral)  O2 SATS - 98%    DIAGNOSIS  Final diagnoses:   Hypokalemia due to excessive renal loss of potassium   COVID-19         DISPOSITION  ADMISSION    Discussed treatment plan and reason for admission with pt/family and admitting physician.  Pt/family voiced understanding of the plan for admission for further testing/treatment as needed.          Jeevan Manuel MD  01/19/22 4629

## 2022-01-19 NOTE — CASE MANAGEMENT/SOCIAL WORK
Discharge Planning Assessment  [facility]     Patient Name: Savannah Andres  MRN: [id number]  Today's Date: 1/19/2022    Admit Date: 1/19/2022     Discharge Needs Assessment     Row Name 01/19/22 1630       Living Environment    Lives With facility resident    Name(s) of Who Lives With Patient Patient resides at [facility].    Current Living Arrangements independent/assisted living facility    Primary Care Provided by other (see comments)  LTC resident.    Provides Primary Care For no one, unable/limited ability to care for self    Family Caregiver if Needed child(kevon), adult    Quality of Family Relationships helpful; involved; supportive    Able to Return to Prior Arrangements yes    Living Arrangement Comments Patient is  resident of [facility].       Resource/Environmental Concerns    Resource/Environmental Concerns none    Transportation Concerns other (see comments)  Patient will likely require ambulance transport upon discharge.       Transition Planning    Patient/Family Anticipates Transition to long-term care facility    Patient/Family Anticipated Services at Transition none    Transportation Anticipated health plan transportation       Discharge Needs Assessment    Current Outpatient/Agency/Support Group other (see comments)  LTC    Equipment Currently Used at Home none    Concerns to be Addressed no discharge needs identified; denies needs/concerns at this time    Anticipated Changes Related to Illness none    Equipment Needed After Discharge none    Discharge Facility/Level of Care Needs other (see comments)  Patient plans to return to [facility].    Patient's Choice of Community Agency(s) Patient will return to [facility].               Discharge Plan     Row Name 01/19/22 1633       Plan    Plan Patient will return to [facility] upon discharge. Will likely require ambulance transport, as patient is COVID positive. No DC needs identified.    Patient/Family in Agreement  with Plan yes    Plan Comments Back to [facility] upon discharge. Will require ambulance transport. No DC needs.              Continued Care and Services - Admitted Since 1/19/2022    Coordination has not been started for this encounter.          Demographic Summary     Row Name 01/19/22 1627       General Information    Admission Type observation    Arrived From long-term care    Required Notices Provided Observation Status Notice    Expected Length of Stay (LOS) Less than 24 hours. Admitted to ED Observation Unit.    Referral Source admission list; emergency department    Reason for Consult discharge planning    Preferred Language English     Used During This Interaction no    General Information Comments Facesheet verified. Role of CCP explained. Appropriate PPE worn at all times.       Contact Information    Permission Granted to Share Info With family/designee    Contact Information Obtained for other (see comments)    Contact Information Comments Verified emergency contact information.               Functional Status     Row Name 01/19/22 1631       Functional Status    Usual Activity Tolerance good    Current Activity Tolerance good       Functional Status, IADL    Medications completely dependent    Meal Preparation completely dependent    Housekeeping completely dependent    Laundry completely dependent    Shopping completely dependent    IADL Comments Patient is a LTC resident at [facility].       Mental Status    General Appearance WDL WDL       Mental Status Summary    Recent Changes in Mental Status/Cognitive Functioning behavior patterns; decision-making/judgment    Mental Status Comments Patient presents to ED from [facility] with concerns for altered/agitated behavior.               Psychosocial     Row Name 01/19/22 1632       Values/Beliefs    Spiritual, Cultural Beliefs, Scientology Practices, Values that Affect Care no       Intellectual Performance WDL    Level of Consciousness  Confusion       Coping/Stress    Major Change/Loss/Stressor none    Patient Personal Strengths strong support system    Sources of Support adult child(kevon)    Reaction to Health Status unable to assess    Understanding of Condition and Treatment unable to assess       Developmental Stage (Eriksson's)    Developmental Stage Stage 8 (65 years-death/Late Adulthood) Integrity vs. Despair       C-SSRS (Recent)    Q1 Wished to be Dead (Past Month) no    Q2 Suicidal Thoughts (Past Month) no    Q6 Suicide Behavior (Lifetime) no       Violence Risk    Feels Like Hurting Others no    Previous Attempt to Harm Others no               Abuse/Neglect     Row Name 01/19/22 9333       Personal Safety    Feels Unsafe at Home or Work/School no    Feels Threatened by Someone no    Does Anyone Try to Keep You From Having Contact with Others or Doing Things Outside Your Home? no    Physical Signs of Abuse Present no               Legal    No documentation.                Substance Abuse    No documentation.                Patient Forms    No documentation.                   Shakir Núñez RN

## 2022-01-20 VITALS
RESPIRATION RATE: 16 BRPM | DIASTOLIC BLOOD PRESSURE: 66 MMHG | TEMPERATURE: 97.9 F | HEIGHT: 62 IN | SYSTOLIC BLOOD PRESSURE: 142 MMHG | WEIGHT: 100 LBS | HEART RATE: 62 BPM | BODY MASS INDEX: 18.4 KG/M2 | OXYGEN SATURATION: 97 %

## 2022-01-20 LAB — BACTERIA SPEC AEROBE CULT: ABNORMAL

## 2022-01-20 PROCEDURE — G0378 HOSPITAL OBSERVATION PER HR: HCPCS

## 2022-01-20 NOTE — CASE MANAGEMENT/SOCIAL WORK
Discharge Planning Assessment  [facility]     Patient Name: Savannah Andres  MRN: [id number]  Today's Date: 1/19/2022    Admit Date: 1/19/2022     Discharge Needs Assessment    No documentation.                Discharge Plan     Row Name 01/19/22 1931       Plan    Plan Comments call placed to daughter to determine need for transport as patient is ready for d/c. daughter advises she is unable to  patient and there is nobody else, she will need to go via stretcher secondary to history of dementia and Covid; call placed to [facility] to schedule transport; left message w/dispatch; updated primary RN and CCP. .me    Row Name 01/19/22 1635       Plan    Plan Patient will return to [facility] upon discharge. Will likely require ambulance transport, as patient is COVID positive. No DC needs identified.    Patient/Family in Agreement with Plan yes    Plan Comments Back to [facility] upon discharge. Will require ambulance transport. No DC needs.              Continued Care and Services - Admitted Since 1/19/2022    Coordination has not been started for this encounter.       Expected Discharge Date and Time     Expected Discharge Date Expected Discharge Time    Jan 19, 2022          Demographic Summary    No documentation.                Functional Status    No documentation.                Psychosocial    No documentation.                Abuse/Neglect    No documentation.                Legal    No documentation.                Substance Abuse    No documentation.                Patient Forms    No documentation.                   Sonali Tucker RN

## 2022-01-20 NOTE — CASE MANAGEMENT/SOCIAL WORK
Discharge Planning Assessment  [facility]     Patient Name: Savannah Andres  MRN: [id number]  Today's Date: 1/19/2022    Admit Date: 1/19/2022     Discharge Needs Assessment    No documentation.                Discharge Plan     Row Name 01/19/22 2325       Plan    Final Discharge Disposition Code 04 - intermediate care facility    Final Note Assisted living facility              Continued Care and Services - Admitted Since 1/19/2022    Coordination has not been started for this encounter.       Expected Discharge Date and Time     Expected Discharge Date Expected Discharge Time    Jan 19, 2022          Demographic Summary    No documentation.                Functional Status    No documentation.                Psychosocial    No documentation.                Abuse/Neglect    No documentation.                Legal    No documentation.                Substance Abuse    No documentation.                Patient Forms    No documentation.                   Vanessa Barker RN

## 2022-02-03 ENCOUNTER — APPOINTMENT (OUTPATIENT)
Dept: CT IMAGING | Facility: HOSPITAL | Age: 87
End: 2022-02-03

## 2022-02-03 ENCOUNTER — APPOINTMENT (OUTPATIENT)
Dept: GENERAL RADIOLOGY | Facility: HOSPITAL | Age: 87
End: 2022-02-03

## 2022-02-03 ENCOUNTER — HOSPITAL ENCOUNTER (INPATIENT)
Facility: HOSPITAL | Age: 87
LOS: 14 days | Discharge: HOME | End: 2022-02-17
Attending: EMERGENCY MEDICINE | Admitting: INTERNAL MEDICINE

## 2022-02-03 DIAGNOSIS — F03.91 DEMENTIA WITH BEHAVIORAL DISTURBANCE, UNSPECIFIED DEMENTIA TYPE: Primary | ICD-10-CM

## 2022-02-03 DIAGNOSIS — R19.7 DIARRHEA, UNSPECIFIED TYPE: ICD-10-CM

## 2022-02-03 DIAGNOSIS — E87.6 HYPOKALEMIA: ICD-10-CM

## 2022-02-03 PROBLEM — F03.918 DEMENTIA WITH BEHAVIORAL DISTURBANCE (HCC): Status: ACTIVE | Noted: 2022-02-03

## 2022-02-03 LAB
ALBUMIN SERPL-MCNC: 3.6 G/DL (ref 3.5–5.2)
ALBUMIN/GLOB SERPL: 1.1 G/DL
ALP SERPL-CCNC: 90 U/L (ref 39–117)
ALT SERPL W P-5'-P-CCNC: 20 U/L (ref 1–33)
ANION GAP SERPL CALCULATED.3IONS-SCNC: 13.1 MMOL/L (ref 5–15)
AST SERPL-CCNC: 21 U/L (ref 1–32)
BACTERIA UR QL AUTO: ABNORMAL /HPF
BASOPHILS # BLD AUTO: 0.06 10*3/MM3 (ref 0–0.2)
BASOPHILS NFR BLD AUTO: 0.9 % (ref 0–1.5)
BILIRUB SERPL-MCNC: 0.5 MG/DL (ref 0–1.2)
BILIRUB UR QL STRIP: NEGATIVE
BUN SERPL-MCNC: 11 MG/DL (ref 8–23)
BUN/CREAT SERPL: 11.2 (ref 7–25)
CALCIUM SPEC-SCNC: 9.7 MG/DL (ref 8.6–10.5)
CHLORIDE SERPL-SCNC: 95 MMOL/L (ref 98–107)
CLARITY UR: CLEAR
CO2 SERPL-SCNC: 29.9 MMOL/L (ref 22–29)
COLOR UR: YELLOW
CREAT SERPL-MCNC: 0.98 MG/DL (ref 0.57–1)
DEPRECATED RDW RBC AUTO: 42.2 FL (ref 37–54)
EOSINOPHIL # BLD AUTO: 0.01 10*3/MM3 (ref 0–0.4)
EOSINOPHIL NFR BLD AUTO: 0.1 % (ref 0.3–6.2)
ERYTHROCYTE [DISTWIDTH] IN BLOOD BY AUTOMATED COUNT: 12.6 % (ref 12.3–15.4)
GFR SERPL CREATININE-BSD FRML MDRD: 54 ML/MIN/1.73
GLOBULIN UR ELPH-MCNC: 3.2 GM/DL
GLUCOSE SERPL-MCNC: 296 MG/DL (ref 65–99)
GLUCOSE UR STRIP-MCNC: ABNORMAL MG/DL
HCT VFR BLD AUTO: 39.6 % (ref 34–46.6)
HGB BLD-MCNC: 13 G/DL (ref 12–15.9)
HGB UR QL STRIP.AUTO: NEGATIVE
HOLD SPECIMEN: NORMAL
HOLD SPECIMEN: NORMAL
HYALINE CASTS UR QL AUTO: ABNORMAL /LPF
IMM GRANULOCYTES # BLD AUTO: 0.02 10*3/MM3 (ref 0–0.05)
IMM GRANULOCYTES NFR BLD AUTO: 0.3 % (ref 0–0.5)
KETONES UR QL STRIP: NEGATIVE
LEUKOCYTE ESTERASE UR QL STRIP.AUTO: ABNORMAL
LYMPHOCYTES # BLD AUTO: 1.58 10*3/MM3 (ref 0.7–3.1)
LYMPHOCYTES NFR BLD AUTO: 23 % (ref 19.6–45.3)
MAGNESIUM SERPL-MCNC: 1.8 MG/DL (ref 1.6–2.4)
MCH RBC QN AUTO: 29.9 PG (ref 26.6–33)
MCHC RBC AUTO-ENTMCNC: 32.8 G/DL (ref 31.5–35.7)
MCV RBC AUTO: 91 FL (ref 79–97)
MONOCYTES # BLD AUTO: 0.59 10*3/MM3 (ref 0.1–0.9)
MONOCYTES NFR BLD AUTO: 8.6 % (ref 5–12)
NEUTROPHILS NFR BLD AUTO: 4.61 10*3/MM3 (ref 1.7–7)
NEUTROPHILS NFR BLD AUTO: 67.1 % (ref 42.7–76)
NITRITE UR QL STRIP: NEGATIVE
NRBC BLD AUTO-RTO: 0 /100 WBC (ref 0–0.2)
PH UR STRIP.AUTO: 6.5 [PH] (ref 5–8)
PLATELET # BLD AUTO: 275 10*3/MM3 (ref 140–450)
PMV BLD AUTO: 10.2 FL (ref 6–12)
POTASSIUM SERPL-SCNC: 2.7 MMOL/L (ref 3.5–5.2)
PROT SERPL-MCNC: 6.8 G/DL (ref 6–8.5)
PROT UR QL STRIP: NEGATIVE
QT INTERVAL: 471 MS
RBC # BLD AUTO: 4.35 10*6/MM3 (ref 3.77–5.28)
RBC # UR STRIP: ABNORMAL /HPF
REF LAB TEST METHOD: ABNORMAL
SODIUM SERPL-SCNC: 138 MMOL/L (ref 136–145)
SP GR UR STRIP: 1.01 (ref 1–1.03)
SQUAMOUS #/AREA URNS HPF: ABNORMAL /HPF
UROBILINOGEN UR QL STRIP: ABNORMAL
WBC # UR STRIP: ABNORMAL /HPF
WBC NRBC COR # BLD: 6.87 10*3/MM3 (ref 3.4–10.8)
WHOLE BLOOD HOLD SPECIMEN: NORMAL
WHOLE BLOOD HOLD SPECIMEN: NORMAL

## 2022-02-03 PROCEDURE — 93010 ELECTROCARDIOGRAM REPORT: CPT | Performed by: INTERNAL MEDICINE

## 2022-02-03 PROCEDURE — 93005 ELECTROCARDIOGRAM TRACING: CPT | Performed by: NURSE PRACTITIONER

## 2022-02-03 PROCEDURE — 80053 COMPREHEN METABOLIC PANEL: CPT | Performed by: NURSE PRACTITIONER

## 2022-02-03 PROCEDURE — 99285 EMERGENCY DEPT VISIT HI MDM: CPT

## 2022-02-03 PROCEDURE — 70450 CT HEAD/BRAIN W/O DYE: CPT

## 2022-02-03 PROCEDURE — 81001 URINALYSIS AUTO W/SCOPE: CPT | Performed by: NURSE PRACTITIONER

## 2022-02-03 PROCEDURE — 83735 ASSAY OF MAGNESIUM: CPT | Performed by: NURSE PRACTITIONER

## 2022-02-03 PROCEDURE — 25010000002 SODIUM CHLORIDE 0.9 % WITH KCL 20 MEQ 20-0.9 MEQ/L-% SOLUTION: Performed by: INTERNAL MEDICINE

## 2022-02-03 PROCEDURE — 85025 COMPLETE CBC W/AUTO DIFF WBC: CPT | Performed by: NURSE PRACTITIONER

## 2022-02-03 PROCEDURE — 71045 X-RAY EXAM CHEST 1 VIEW: CPT

## 2022-02-03 RX ORDER — POTASSIUM CHLORIDE 750 MG/1
40 TABLET, FILM COATED, EXTENDED RELEASE ORAL AS NEEDED
Status: DISCONTINUED | OUTPATIENT
Start: 2022-02-03 | End: 2022-02-17 | Stop reason: HOSPADM

## 2022-02-03 RX ORDER — POTASSIUM CHLORIDE 750 MG/1
40 TABLET, FILM COATED, EXTENDED RELEASE ORAL ONCE
Status: COMPLETED | OUTPATIENT
Start: 2022-02-03 | End: 2022-02-03

## 2022-02-03 RX ORDER — METOPROLOL SUCCINATE 50 MG/1
50 TABLET, EXTENDED RELEASE ORAL DAILY
Status: DISCONTINUED | OUTPATIENT
Start: 2022-02-03 | End: 2022-02-17 | Stop reason: HOSPADM

## 2022-02-03 RX ORDER — OLANZAPINE 10 MG/1
2.5 INJECTION, POWDER, LYOPHILIZED, FOR SOLUTION INTRAMUSCULAR ONCE
Status: COMPLETED | OUTPATIENT
Start: 2022-02-04 | End: 2022-02-04

## 2022-02-03 RX ORDER — ACETAMINOPHEN 325 MG/1
650 TABLET ORAL EVERY 4 HOURS PRN
Status: DISCONTINUED | OUTPATIENT
Start: 2022-02-03 | End: 2022-02-17 | Stop reason: HOSPADM

## 2022-02-03 RX ORDER — ONDANSETRON 2 MG/ML
4 INJECTION INTRAMUSCULAR; INTRAVENOUS EVERY 6 HOURS PRN
Status: DISCONTINUED | OUTPATIENT
Start: 2022-02-03 | End: 2022-02-17 | Stop reason: HOSPADM

## 2022-02-03 RX ORDER — ONDANSETRON 4 MG/1
4 TABLET, FILM COATED ORAL EVERY 6 HOURS PRN
Status: DISCONTINUED | OUTPATIENT
Start: 2022-02-03 | End: 2022-02-17 | Stop reason: HOSPADM

## 2022-02-03 RX ORDER — POTASSIUM CHLORIDE 750 MG/1
10 TABLET, FILM COATED, EXTENDED RELEASE ORAL DAILY
Status: DISCONTINUED | OUTPATIENT
Start: 2022-02-03 | End: 2022-02-05

## 2022-02-03 RX ORDER — NITROGLYCERIN 0.4 MG/1
0.4 TABLET SUBLINGUAL
Status: DISCONTINUED | OUTPATIENT
Start: 2022-02-03 | End: 2022-02-17 | Stop reason: HOSPADM

## 2022-02-03 RX ORDER — POTASSIUM CHLORIDE 1.5 G/1.77G
40 POWDER, FOR SOLUTION ORAL AS NEEDED
Status: DISCONTINUED | OUTPATIENT
Start: 2022-02-03 | End: 2022-02-17 | Stop reason: HOSPADM

## 2022-02-03 RX ORDER — UREA 10 %
3 LOTION (ML) TOPICAL NIGHTLY PRN
Status: DISCONTINUED | OUTPATIENT
Start: 2022-02-03 | End: 2022-02-17 | Stop reason: HOSPADM

## 2022-02-03 RX ORDER — OLANZAPINE 10 MG/1
2.5 INJECTION, POWDER, LYOPHILIZED, FOR SOLUTION INTRAMUSCULAR ONCE
Status: COMPLETED | OUTPATIENT
Start: 2022-02-03 | End: 2022-02-03

## 2022-02-03 RX ORDER — SODIUM CHLORIDE AND POTASSIUM CHLORIDE 150; 900 MG/100ML; MG/100ML
100 INJECTION, SOLUTION INTRAVENOUS CONTINUOUS
Status: DISCONTINUED | OUTPATIENT
Start: 2022-02-03 | End: 2022-02-04

## 2022-02-03 RX ORDER — FUROSEMIDE 20 MG/1
20 TABLET ORAL DAILY PRN
Status: DISCONTINUED | OUTPATIENT
Start: 2022-02-03 | End: 2022-02-05

## 2022-02-03 RX ORDER — POTASSIUM CHLORIDE 7.45 MG/ML
10 INJECTION INTRAVENOUS
Status: DISCONTINUED | OUTPATIENT
Start: 2022-02-03 | End: 2022-02-17 | Stop reason: HOSPADM

## 2022-02-03 RX ADMIN — SODIUM CHLORIDE 500 ML: 9 INJECTION, SOLUTION INTRAVENOUS at 14:34

## 2022-02-03 RX ADMIN — POTASSIUM CHLORIDE 40 MEQ: 750 TABLET, EXTENDED RELEASE ORAL at 14:37

## 2022-02-03 RX ADMIN — OLANZAPINE 2.5 MG: 10 INJECTION, POWDER, FOR SOLUTION INTRAMUSCULAR at 22:32

## 2022-02-03 RX ADMIN — POTASSIUM CHLORIDE AND SODIUM CHLORIDE 100 ML/HR: 900; 150 INJECTION, SOLUTION INTRAVENOUS at 23:14

## 2022-02-03 RX ADMIN — POTASSIUM CHLORIDE 40 MEQ: 750 TABLET, EXTENDED RELEASE ORAL at 18:52

## 2022-02-03 RX ADMIN — POTASSIUM CHLORIDE 10 MEQ: 750 TABLET, EXTENDED RELEASE ORAL at 20:36

## 2022-02-03 RX ADMIN — POTASSIUM CHLORIDE 40 MEQ: 750 TABLET, EXTENDED RELEASE ORAL at 23:12

## 2022-02-03 NOTE — ED PROVIDER NOTES
MD ATTESTATION NOTE    The LOCO and I have discussed this patient's history, physical exam, and treatment plan.  I have reviewed the documentation and personally had a face to face interaction with the patient. I affirm the documentation and agree with the treatment and plan.  The attached note describes my personal findings.      History  86-year-old with history of dementia who lives at [facility] presents with worsening confusion and decreased appetite.  Patient herself has no complaints but history is obtained from daughter-in-law who lives in [facility].    Physical Exam  Vital Signs reviewed  GENERAL: Elderly female in no obvious distress.  She is oriented to name and hospital.  HENT: nares patent atraumatic  EYES: no scleral icterus  CV: regular rhythm, regular rate  RESPIRATORY: normal effort, clear to auscultation bilaterally-O2 saturations upper 90s on room air  ABDOMEN: soft, nontender to palpation  MUSCULOSKELETAL: no deformity  NEURO: Strength sensation and coordination are grossly intact.  Speech is coherent and clear.  Mentation-oriented to name and hospital but not year.  She does exhibit findings that would suggest mild to moderate dementia.  SKIN: warm, dry-no unusual rashes are noted      Disposition  I discussed treatment and evaluation this patient with NP Maryam Strauss.  Patient with worsening dementia and decreasing ability to take care of herself.  Lab testing shows recurrence of hypokalemia.  Will likely admit for hypokalemia, may well need placement in a higher level of care.       Cristino Cali MD  02/03/22 5965

## 2022-02-03 NOTE — ED PROVIDER NOTES
EMERGENCY DEPARTMENT ENCOUNTER    Room Number:  S406/1  Date of encounter:  2/3/2022  PCP: Vasu Del Angel MD  Historian: patient, EMS , daughter in law   Full history not obtainable due to: dementia     HPI:  Chief Complaint: AMS     Context: Savannah Andres is a 86 y.o. female with a hx dementia who presents to the ED c/o AMS onset over the past 2 weeks with dx of covid. The pt daughter provides the hx. She states the pt does have dementia but her mental status has been significantly worsening since dx. Associated diarrhea and concern for dehydration. She believes the pt has been eating and drinking less due to fear of having diarrhea. The pt is a resident of the [facility] and she does not believe they monitor how much she eats or drinks.     She affirms the pt was admitted here last week for low potassium and states her mental status was abnormal at that time but did seem to temporarily improve a little when she was discharged back to the [facility].      MEDICAL RECORD REVIEW:  Pt seen in ED 1/19/2022 by Dr Manuel for anxiety. Her K was 2.3 She was admitted the obs unit for replacement and ultimately d/c the next day.    PAST MEDICAL HISTORY    Active Ambulatory Problems     Diagnosis Date Noted   • Third degree heart block (HCC) 11/22/2016   • CHF (congestive heart failure), NYHA class III (HCC) 11/30/2016   • Hyperglycemia 12/12/2016   • Hypokalemia 12/12/2016   • Acute midline low back pain without sciatica 01/22/2018   • Inflamed sebaceous cyst 08/30/2018   • Hypertension 06/04/2019   • Presence of permanent cardiac pacemaker 06/04/2019   • Valvular heart disease 06/04/2019   • Pulmonary hypertension (HCC) 06/20/2019   • Hordeolum externum of right lower eyelid 11/15/2019   • Cardiomyopathy (HCC) 12/03/2019     Resolved Ambulatory Problems     Diagnosis Date Noted   • Dysuria 01/16/2017   • Acute cystitis with hematuria 01/16/2017   • Viral enteritis 02/14/2017     Past Medical History:   Diagnosis Date   • Bilateral  "leg edema    • CHB (complete heart block) (HCC)    • CHF (congestive heart failure) (HCC)    • Loss of consciousness (HCC)    • Skin cyst    • SOB (shortness of breath)    • Symptomatic bradycardia    • Syncope          PAST SURGICAL HISTORY  Past Surgical History:   Procedure Laterality Date   • CARDIAC ELECTROPHYSIOLOGY PROCEDURE N/A 11/23/2016    Procedure: Pacemaker DC new  [employer];  Surgeon: Ananth Maloclm MD;  Location: [facility] INVASIVE LOCATION;  Service:    • PACEMAKER IMPLANTATION           FAMILY HISTORY  Family History   Problem Relation Age of Onset   • Hypertension Mother    • Hypertension Father          SOCIAL HISTORY  Social History     Socioeconomic History   • Marital status: Single   • Number of children: 3   Tobacco Use   • Smoking status: Never Smoker   • Smokeless tobacco: Never Used   • Tobacco comment: caffeine use   Substance and Sexual Activity   • Alcohol use: No   • Drug use: No   • Sexual activity: Defer         ALLERGIES  Patient has no known allergies.        REVIEW OF SYSTEMS  Review of Systems   All systems reviewed and marked as negative except as listed in HPI       PHYSICAL EXAM    I have reviewed the triage vital signs and nursing notes.    ED Triage Vitals   Temp Heart Rate Resp BP SpO2   02/03/22 1158 02/03/22 1156 02/03/22 1156 02/03/22 1156 02/03/22 1156   97.6 °F (36.4 °C) 90 18 150/80 100 %      Temp src Heart Rate Source Patient Position BP Location FiO2 (%)   -- -- -- -- --              GENERAL: alert well developed, well nourished in no distress, appears anxious. States \"please be nice to me\" multiple times. Frail and elderly appearing.   HENT: NCAT, neck supple, trachea midline  EYES: no scleral icterus, PERRL, normal conjunctivae  CV: regular rhythm, regular rate, no murmur  RESPIRATORY: unlabored effort, CTAB  ABDOMEN: soft, nontender, nondistended, bowel sounds present  MUSCULOSKELETAL: no gross deformity  NEURO: alert,  sensory and motor function of " extremities grossly intact, speech clear, mental status normal/baseline  SKIN: warm, dry, no rash  PSYCH:  Appropriate mood and affect    Vital signs and nursing notes reviewed.          LAB RESULTS  Recent Results (from the past 24 hour(s))   Comprehensive Metabolic Panel    Collection Time: 02/03/22 12:32 PM    Specimen: Blood   Result Value Ref Range    Glucose 296 (H) 65 - 99 mg/dL    BUN 11 8 - 23 mg/dL    Creatinine 0.98 0.57 - 1.00 mg/dL    Sodium 138 136 - 145 mmol/L    Potassium 2.7 (L) 3.5 - 5.2 mmol/L    Chloride 95 (L) 98 - 107 mmol/L    CO2 29.9 (H) 22.0 - 29.0 mmol/L    Calcium 9.7 8.6 - 10.5 mg/dL    Total Protein 6.8 6.0 - 8.5 g/dL    Albumin 3.60 3.50 - 5.20 g/dL    ALT (SGPT) 20 1 - 33 U/L    AST (SGOT) 21 1 - 32 U/L    Alkaline Phosphatase 90 39 - 117 U/L    Total Bilirubin 0.5 0.0 - 1.2 mg/dL    eGFR Non African Amer 54 (L) >60 mL/min/1.73    Globulin 3.2 gm/dL    A/G Ratio 1.1 g/dL    BUN/Creatinine Ratio 11.2 7.0 - 25.0    Anion Gap 13.1 5.0 - 15.0 mmol/L   CBC Auto Differential    Collection Time: 02/03/22 12:32 PM    Specimen: Blood   Result Value Ref Range    WBC 6.87 3.40 - 10.80 10*3/mm3    RBC 4.35 3.77 - 5.28 10*6/mm3    Hemoglobin 13.0 12.0 - 15.9 g/dL    Hematocrit 39.6 34.0 - 46.6 %    MCV 91.0 79.0 - 97.0 fL    MCH 29.9 26.6 - 33.0 pg    MCHC 32.8 31.5 - 35.7 g/dL    RDW 12.6 12.3 - 15.4 %    RDW-SD 42.2 37.0 - 54.0 fl    MPV 10.2 6.0 - 12.0 fL    Platelets 275 140 - 450 10*3/mm3    Neutrophil % 67.1 42.7 - 76.0 %    Lymphocyte % 23.0 19.6 - 45.3 %    Monocyte % 8.6 5.0 - 12.0 %    Eosinophil % 0.1 (L) 0.3 - 6.2 %    Basophil % 0.9 0.0 - 1.5 %    Immature Grans % 0.3 0.0 - 0.5 %    Neutrophils, Absolute 4.61 1.70 - 7.00 10*3/mm3    Lymphocytes, Absolute 1.58 0.70 - 3.10 10*3/mm3    Monocytes, Absolute 0.59 0.10 - 0.90 10*3/mm3    Eosinophils, Absolute 0.01 0.00 - 0.40 10*3/mm3    Basophils, Absolute 0.06 0.00 - 0.20 10*3/mm3    Immature Grans, Absolute 0.02 0.00 - 0.05 10*3/mm3     nRBC 0.0 0.0 - 0.2 /100 WBC   Green Top (Gel)    Collection Time: 02/03/22 12:32 PM   Result Value Ref Range    Extra Tube Hold for add-ons.    Lavender Top    Collection Time: 02/03/22 12:32 PM   Result Value Ref Range    Extra Tube hold for add-on    Gold Top - SST    Collection Time: 02/03/22 12:32 PM   Result Value Ref Range    Extra Tube Hold for add-ons.    Light Blue Top    Collection Time: 02/03/22 12:32 PM   Result Value Ref Range    Extra Tube hold for add-on    Magnesium    Collection Time: 02/03/22 12:32 PM    Specimen: Blood   Result Value Ref Range    Magnesium 1.8 1.6 - 2.4 mg/dL   Urinalysis With Microscopic If Indicated (No Culture) - Urine, Catheter    Collection Time: 02/03/22  1:01 PM    Specimen: Urine, Catheter   Result Value Ref Range    Color, UA Yellow Yellow, Straw    Appearance, UA Clear Clear    pH, UA 6.5 5.0 - 8.0    Specific Gravity, UA 1.007 1.005 - 1.030    Glucose,  mg/dL (Trace) (A) Negative    Ketones, UA Negative Negative    Bilirubin, UA Negative Negative    Blood, UA Negative Negative    Protein, UA Negative Negative    Leuk Esterase, UA Trace (A) Negative    Nitrite, UA Negative Negative    Urobilinogen, UA 0.2 E.U./dL 0.2 - 1.0 E.U./dL   Urinalysis, Microscopic Only - Urine, Catheter    Collection Time: 02/03/22  1:01 PM    Specimen: Urine, Catheter   Result Value Ref Range    RBC, UA 0-2 None Seen, 0-2 /HPF    WBC, UA 3-5 (A) None Seen, 0-2 /HPF    Bacteria, UA None Seen None Seen /HPF    Squamous Epithelial Cells, UA 3-6 (A) None Seen, 0-2 /HPF    Hyaline Casts, UA 0-2 None Seen /LPF    Methodology Automated Microscopy    ECG 12 Lead    Collection Time: 02/03/22  1:13 PM   Result Value Ref Range    QT Interval 471 ms       Ordered the above labs and independently reviewed the results.        RADIOLOGY  CT Head Without Contrast    Result Date: 2/3/2022  CT HEAD WITHOUT CONTRAST  HISTORY: Altered mental status.  COMPARISON: None.  FINDINGS: The brain ventricles are  symmetrical. Confluent areas of decreased attenuation are noted involving the white matter of the cerebral hemispheres bilaterally. Appearance is consistent with extensive small vessel ischemic disease. There is no evidence of acute infarction or of intracranial hemorrhage. Further evaluation could be performed with an MRI examination of the brain as indicated.   Radiation dose reduction techniques were utilized, including automated exposure control and exposure modulation based on body size.        XR Chest 1 View    Result Date: 2/3/2022  PORTABLE CHEST 02/03/2022 AT 12:22 PM  CLINICAL HISTORY: Weakness  Compared to the previous chest x-ray dated 01/19/2022.  The lungs are well-expanded and appear free of focal infiltrates. There are no pleural effusions. The heart is normal in size. A dual-chamber pacemaker is in place in satisfactory position in the left subclavian vein.  IMPRESSIONS: No evidence of acute disease within the chest.  This report was finalized on 2/3/2022 12:39 PM by Dr. Cristino Man M.D.        I ordered the above noted radiological studies. Independently reviewed by me and discussed with radiologist.  See dictation above for official radiology interpretation.      PROCEDURES    Procedures        MEDICATIONS GIVEN IN ER    Medications   nitroglycerin (NITROSTAT) SL tablet 0.4 mg (has no administration in time range)   acetaminophen (TYLENOL) tablet 650 mg (has no administration in time range)   ondansetron (ZOFRAN) tablet 4 mg (has no administration in time range)     Or   ondansetron (ZOFRAN) injection 4 mg (has no administration in time range)   melatonin tablet 3 mg (has no administration in time range)   sodium chloride 0.9 % with KCl 20 mEq/L infusion (has no administration in time range)   potassium chloride (K-DUR,KLOR-CON) ER tablet 40 mEq (has no administration in time range)     Or   potassium chloride (KLOR-CON) packet 40 mEq (has no administration in time range)     Or   potassium  chloride 10 mEq in 100 mL IVPB (has no administration in time range)   potassium chloride (K-DUR,KLOR-CON) ER tablet 40 mEq (40 mEq Oral Given 2/3/22 1437)   sodium chloride 0.9 % bolus 500 mL (0 mL Intravenous Stopped 2/3/22 1504)         PROGRESS, DATA ANALYSIS, CONSULTS, AND MEDICAL DECISION MAKING    All labs have been independently reviewed by me.  All radiology studies have been reviewed by me.   EKG's independently reviewed by me.  Discussion below represents my analysis of pertinent findings related to patient's condition, differential diagnosis, treatment plan and final disposition.    DIFFERENTIAL DIAGNOSIS INCLUDE BUT NOT LIMITED TO: Metabolic encephalopathy, EMILIANO, dehydration, anemia, arrhythmia, AMI, USA, viral syndrome, COVID-19, pneumonia,, subarachnoid hemorrhage, subdural hematoma, brain tumor, withdrawal, polypharmacy      ED Course as of 02/03/22 1755   Thu Feb 03, 2022   1230 I viewed chest x-ray on PACS.  My findings are cardiomegaly. [JS]   1245 Glucose(!): 296 [JS]   1245 Potassium(!): 2.7 [JS]   1314 EKG          EKG time: 1313  Rhythm/Rate: Sinus with ventricular paced rhythm  P waves and OK: Normal P waves and OK intervals  QRS, axis: Ventricular paced rhythm with IVCD  ST and T waves: Nonspecific ST and T wave changes    Interpreted Contemporaneously by me, independently viewed  No prior to compare   [DB]   1417 Discussed pt with Dr Rodriguez who agrees to admit the pt  [JS]      ED Course User Index  [DB] Cristino Cali MD  [JS] Nimisha Strauss, APRN       BP - 152/99  HR - 79  TEMP - 98.5 °F (36.9 °C) (Oral)  O2 SATS - 98%      DIAGNOSIS  Final diagnoses:   Dementia with behavioral disturbance, unspecified dementia type (HCC)   Diarrhea, unspecified type   Hypokalemia         DISPOSITION  Admission     Pt masked in first look. I wore appropriate PPE throughout my encounters with the pt. I performed hand hygiene on entry into the pt room and upon exit.     Dictated utilizing Dragon  dictation:  Much of this encounter note is an electronic transcription/translation of spoken language to printed text. The electronic translation of spoken language may permit erroneous, or at times, nonsensical words or phrases to be inadvertently transcribed; Although I have reviewed the note for such errors, some may still exist.     Nimisha Strauss, MERYL  02/03/22 9209

## 2022-02-03 NOTE — PLAN OF CARE
Goal Outcome Evaluation:  Plan of Care Reviewed With: patient           Outcome Summary: admitted from ER, from [facility], with increased confusion, dehydration and hypokalemia, replacing K, a little disoriented to situation, assist x1, reg cardiac diet, vss, will continue to monitor

## 2022-02-04 LAB
ANION GAP SERPL CALCULATED.3IONS-SCNC: 13 MMOL/L (ref 5–15)
BUN SERPL-MCNC: 6 MG/DL (ref 8–23)
BUN/CREAT SERPL: 7.7 (ref 7–25)
CALCIUM SPEC-SCNC: 9.1 MG/DL (ref 8.6–10.5)
CHLORIDE SERPL-SCNC: 107 MMOL/L (ref 98–107)
CO2 SERPL-SCNC: 22 MMOL/L (ref 22–29)
CREAT SERPL-MCNC: 0.78 MG/DL (ref 0.57–1)
DEPRECATED RDW RBC AUTO: 41.6 FL (ref 37–54)
ERYTHROCYTE [DISTWIDTH] IN BLOOD BY AUTOMATED COUNT: 12.5 % (ref 12.3–15.4)
GFR SERPL CREATININE-BSD FRML MDRD: 70 ML/MIN/1.73
GLUCOSE SERPL-MCNC: 150 MG/DL (ref 65–99)
HCT VFR BLD AUTO: 39.3 % (ref 34–46.6)
HGB BLD-MCNC: 13 G/DL (ref 12–15.9)
MCH RBC QN AUTO: 29.5 PG (ref 26.6–33)
MCHC RBC AUTO-ENTMCNC: 33.1 G/DL (ref 31.5–35.7)
MCV RBC AUTO: 89.3 FL (ref 79–97)
PLATELET # BLD AUTO: 233 10*3/MM3 (ref 140–450)
PMV BLD AUTO: 10.4 FL (ref 6–12)
POTASSIUM SERPL-SCNC: 4.4 MMOL/L (ref 3.5–5.2)
RBC # BLD AUTO: 4.4 10*6/MM3 (ref 3.77–5.28)
SODIUM SERPL-SCNC: 142 MMOL/L (ref 136–145)
WBC NRBC COR # BLD: 7.41 10*3/MM3 (ref 3.4–10.8)

## 2022-02-04 PROCEDURE — 85027 COMPLETE CBC AUTOMATED: CPT | Performed by: INTERNAL MEDICINE

## 2022-02-04 PROCEDURE — 25010000002 HALOPERIDOL LACTATE PER 5 MG: Performed by: NURSE PRACTITIONER

## 2022-02-04 PROCEDURE — 80048 BASIC METABOLIC PNL TOTAL CA: CPT | Performed by: INTERNAL MEDICINE

## 2022-02-04 PROCEDURE — 25010000002 ZIPRASIDONE MESYLATE PER 10 MG: Performed by: HOSPITALIST

## 2022-02-04 RX ORDER — ZIPRASIDONE MESYLATE 20 MG/ML
20 INJECTION, POWDER, LYOPHILIZED, FOR SOLUTION INTRAMUSCULAR EVERY 4 HOURS PRN
Status: DISCONTINUED | OUTPATIENT
Start: 2022-02-04 | End: 2022-02-06

## 2022-02-04 RX ORDER — HALOPERIDOL 5 MG/ML
5 INJECTION INTRAMUSCULAR ONCE
Status: COMPLETED | OUTPATIENT
Start: 2022-02-04 | End: 2022-02-04

## 2022-02-04 RX ADMIN — POTASSIUM CHLORIDE 10 MEQ: 750 TABLET, EXTENDED RELEASE ORAL at 08:17

## 2022-02-04 RX ADMIN — HALOPERIDOL LACTATE 5 MG: 5 INJECTION, SOLUTION INTRAMUSCULAR at 05:31

## 2022-02-04 RX ADMIN — METOPROLOL SUCCINATE 50 MG: 50 TABLET, EXTENDED RELEASE ORAL at 08:16

## 2022-02-04 RX ADMIN — OLANZAPINE 2.5 MG: 10 INJECTION, POWDER, FOR SOLUTION INTRAMUSCULAR at 00:07

## 2022-02-04 RX ADMIN — LISINOPRIL 7.5 MG: 5 TABLET ORAL at 08:16

## 2022-02-04 NOTE — PLAN OF CARE
Goal Outcome Evaluation:           Progress: no change  Outcome Summary: Pt pleasantly confused at this time, restraints were removed and patient has tolerated, psych meds on board if needed PRN, incontinent care completed urine output is good, in contact spore precautions although no BM since admission, spoke with family to update them, assisted with feeding at meal time, potassium replaced and within normal limits, IV saline locked, VSS no acute needs

## 2022-02-04 NOTE — NURSING NOTE
7286 Patient bed alarm going off. Went to patient room, patient managed to get bilateral soft wrist restraints off and standing at the side of the bed. Patient disoriented. Pt refusing to go to bed or to chair. Patient threatening to leave. Patient verbally aggressive, and started to hit staff, and pulling lines. Security called.     0005 Called and spoke with MERYL Lai, and she gave order for 3 points restraints and she ordered 2.5 Zyprexa intramuscular once.     0014 Patient placed on 3 points restraints. Charge nurse and  at bedside. Attempted calling family, no answer, left voicemail for call back.

## 2022-02-04 NOTE — PROGRESS NOTES
"    DAILY PROGRESS NOTE  [facility]    Patient Identification:  Name: Savannah Andres  Age: 86 y.o.  Sex: female  :  1935  MRN: [id number]         Primary Care Physician: Vasu Del Angel MD    Subjective:  Interval History: History and ROS completely unreliable.  This patient has completely delusional and demented at this point.  She is begging to get out of restraints.  Case discussed with charge RN and patient was able to get herself out of restraints last night and was found hugging the curtain in the room.  She is not making any sense to me at this time as she is in obvious discomfort    Objective: Elderly and very frail.  I am concerned that she is going to hurt herself    Scheduled Meds:lisinopril, 7.5 mg, Oral, Daily  metoprolol succinate XL, 50 mg, Oral, Daily  potassium chloride, 10 mEq, Oral, Daily      Continuous Infusions:     Vital signs in last 24 hours:  Temp:  [96.9 °F (36.1 °C)-98.5 °F (36.9 °C)] 96.9 °F (36.1 °C)  Heart Rate:  [] 77  Resp:  [18-22] 18  BP: (133-169)/(77-99) 169/93    Intake/Output:    Intake/Output Summary (Last 24 hours) at 2022 1036  Last data filed at 2022 0600  Gross per 24 hour   Intake 180 ml   Output --   Net 180 ml       Exam:  /93   Pulse 77   Temp 96.9 °F (36.1 °C) (Oral)   Resp 18   Ht 157.5 cm (62\")   Wt 38.8 kg (85 lb 9.6 oz)   SpO2 92%   BMI 15.66 kg/m²     General Appearance:    Alert, completely demented agitated and writhing.  I was cautious to get overly to close as I was worried about her possibly being aggressive                          Head:    Normocephalic, without obvious abnormality, atraumatic                           Eyes:    PERRL, conjunctivae/corneas clear, EOM's intact, both eyes                         Throat:   Oral mucosa pink and moist                           Neck:   Supple, no JVD                         Lungs:    Clear to auscultation bilaterally, respirations unlabored                 Chest " Wall:    No tenderness or deformity                          Heart:    Regular rate and rhythm, S1 and S2 normal                 Abdomen:     Soft, nontender, bowel sounds active                 Extremities: Chronic changes without cellulitis, no cyanosis or edema                        Pulses:   Pulses palpable in all extremities                            Skin:   Skin is warm and dry                  Neurologic:   CNII-XII intact, moving all with nothing that seems focal     Data Review:  Labs in chart were reviewed.    Assessment:  Active Hospital Problems    Diagnosis  POA   • **Dementia with behavioral disturbance (HCC) [F03.91]  Yes   • Cardiomyopathy (HCC) [I42.9]  Yes   • Pulmonary hypertension (HCC) [I27.20]  Yes   • Hypertension [I10]  Yes   • Hypokalemia [E87.6]  Yes   • CHF (congestive heart failure), NYHA class III (HCC) [I50.9]  Yes      Resolved Hospital Problems   No resolved problems to display.       Plan:    Encephalopathy/advanced dementia with behavior disorder    -Currently in restraints and she is already got herself out of these once   -Fall precautions   -She is received Haldol as well as Zyprexa but is completely delusional.  I am going to try Geodon 20 mg to see if we can get this patient some peace   -Limit insulting factors -stop IVF   -Long-term prognosis very poor -I would support changing goals of care and full palliation.  No family at bedside and per review of chart daughter has been updated and I also called [phone] for further updates and clarification but there was no answer     K replaced    No signs of infectious etiology    CODE STATUS currently full -could use some further clarification    Raymundo Erwin MD  2/4/2022  10:36 EST

## 2022-02-04 NOTE — NURSING NOTE
2200 Patient bed alarm going off. Went to patient room. Patient is disoriented to place, time and situation. Patient standing on the side of the bed and wanting to leave the hospital. Reoriented patient and instructed to go back to bed. Patient refusing to go back to bed, and became verbally abusive and threatening to hit and kick staff. Security called. Called Jurgen Bansal APRN to notify of above.       2210 MERYL Lai called back and she gave order for soft bilateral wrist restraints and Intramuscular 2.5 mg Zyprexa.  at bedside. Charge nurse notified.     Called patient son Collin and daughter, but was not able to get hold of both. Left voicemail to call back.

## 2022-02-04 NOTE — CONSULTS
"Adult Nutrition  Assessment/PES    Patient Name:  Savannah Andres  YOB: 1935  MRN: [id number]  Admit Date:  2/3/2022    Assessment Date:  2/4/2022    Comments:  MST 3, BMI 15.6. Severe malnutrition found due to severe weight loss:15# weight loss in 1 month (-15%), 1/19/2022: 100#, 2/3/2022: 85# and severe muscle and fat wasting. Pt was not very talkative during consult and the only preference she provided was that she likes chocolate flavored ice cream. Ordered Magic Cup TID in chocolate and Boost Plus TID in chocolate. 25% x 1 meal recorded. Continue to follow.     Reason for Assessment     Row Name 02/04/22 1116          Reason for Assessment    Reason For Assessment identified at risk by screening criteria     Diagnosis other (see comments)  Admit w/: memory problem, heart failure, low potassium, high blood pressure, pulmonary HTN, heart muscle disorder, hx: complete heart block, elevated blood sugar, pacemaker, sty, heart valve disease     Identified At Risk by Screening Criteria MST SCORE 2+; BMI                Nutrition/Diet History     Row Name 02/04/22 1117          Nutrition/Diet History    Typical Food/Fluid Intake Pt would not respond to RD's questions about intake.     Food Preferences Said she likes chocolate flavor the best for Magic Cup.     Factors Affecting Nutritional Intake other (see comments)  decreased appetite                Anthropometrics     Row Name 02/04/22 1121 02/04/22 1119       Anthropometrics    Height 157.5 cm (62.01\") 157.5 cm (62.01\")    Weight -- 38.8 kg (85 lb 8.6 oz)  not weighed by RD       Ideal Body Weight (IBW)    Ideal Body Weight (IBW) (kg) 50.45 50.45    % Ideal Body Weight -- 76.91    % of Ideal Body Weight Assessment -- 70-79%: moderate deficit       Usual Body Weight (UBW)    Weight Loss Time Frame -- 15# weight loss in 1 month (-15%), 1/19/2022: 100#, 2/3/2022: 85#       Body Mass Index (BMI)    BMI (kg/m2) -- 15.67    BMI Assessment -- BMI less than " "16: protein-energy malnutrition grade III               Labs/Tests/Procedures/Meds     Row Name 02/04/22 1120          Labs/Procedures/Meds    Lab Results Reviewed reviewed     Lab Results Comments glucose (high), BUN (low)            Diagnostic Tests/Procedures    Diagnostic Test/Procedure Reviewed reviewed     Diagnostic Test/Procedures Comments CT            Medications    Pertinent Medications Reviewed reviewed     Pertinent Medications Comments lisinopril, metoprolol succinate, potassium chloride, PRN: potassium chloride                Physical Findings     Row Name 02/04/22 1121          Physical Findings    Overall Physical Appearance loss of muscle mass; generalized wasting; loss of subcutaneous fat                Estimated/Assessed Needs     Row Name 02/04/22 1121 02/04/22 1119       Calculation Measurements    Weight Used For Calculations 38.8 kg (85 lb 8.6 oz) --    Height 157.5 cm (62.01\") 157.5 cm (62.01\")       Estimated/Assessed Needs    Additional Documentation Fluid Requirements (Group); Protein Requirements (Group); KCAL/KG (Group) --       KCAL/KG    KCAL/KG 35 Kcal/Kg (kcal); 40 Kcal/Kg (kcal) --    35 Kcal/Kg (kcal) 1358 --    40 Kcal/Kg (kcal) 1552 --       Protein Requirements    Weight Used For Protein Calculations 38.8 kg (85 lb 8.6 oz) --    Est Protein Requirement Amount (gms/kg) 1.5 gm protein --    Estimated Protein Requirements (gms/day) 58.2 --       Fluid Requirements    Fluid Requirements (mL/day) 1500 --    RDA Method (mL) 1500 --               Nutrition Prescription Ordered     Row Name 02/04/22 1122          Nutrition Prescription PO    Current PO Diet Regular     Common Modifiers Cardiac                Evaluation of Received Nutrient/Fluid Intake     Row Name 02/04/22 1123          PO Evaluation    % PO Intake 25% x 1                  Malnutrition Severity Assessment     Row Name 02/04/22 1123          Malnutrition Severity Assessment    Malnutrition Type Chronic Disease - " Related Malnutrition            Unintentional Weight Loss     Unintentional Weight Loss Findings Severe     Unintentional Weight Loss  Weight loss greater than 5% in one month  15# weight loss in 1 month (-15%), 1/19/2022: 100#, 2/3/2022: 85#            Muscle Loss    Loss of Muscle Mass Findings Severe     [place] Region Severe - deep hollowing/scooping, lack of muscle to touch, facial bones well defined     Clavicle Bone Region Severe - protruding prominent bone     Acromion Bone Region Severe - squared shoulders, bones, and acromion process protrusion prominent     Dorsal Hand Region Moderate - slight depression            Fat Loss    Subcutaneous Fat Loss Findings Severe     Orbital Region  Severe - pronounced hollowness/depression, dark circles, loose saggy skin     Upper Arm Region Severe - mostly skin, very little space between folds, fingers touch            Criteria Met (Must meet criteria for severity in at least 2 of these categories: M Wasting, Fat Loss, Fluid, Secondary Signs, Wt. Status, Intake)    Patient meets criteria for  Severe Malnutrition                 Problem/Interventions:   Problem 1     Row Name 02/04/22 1125          Nutrition Diagnoses Problem 1    Problem 1 Malnutrition     Etiology (related to) Medical Diagnosis     Neurological Other (comment)  memory problem, dementia with behavioral distrubance     Signs/Symptoms (evidenced by) Unintended Weight Change; Other (comment)  muscle and fat wasting     Unintended Weight Change Loss     Number of Pounds Lost 15     Weight loss time period 1 month                      Intervention Goal     Row Name 02/04/22 1125          Intervention Goal    General Meet nutritional needs for age/condition     PO Establish PO; Tolerate PO; Meet estimated needs; Increase intake     Weight Appropriate weight gain                Nutrition Intervention     Row Name 02/04/22 1126          Nutrition Intervention    RD/Tech Action Encourage intake;  Recommend/ordered; Follow Tx progress; Care plan reviewd     Recommended/Ordered Supplement                Nutrition Prescription     Row Name 02/04/22 1126          Nutrition Prescription PO    PO Prescription Begin/change supplement     Supplement Magic Cup; Boost Plus     Supplement Frequency 3 times a day     New PO Prescription Ordered? Yes                Education/Evaluation     Row Name 02/04/22 1126          Education    Education Will Instruct as appropriate            Monitor/Evaluation    Monitor Per protocol; PO intake; Supplement intake; Pertinent labs; Weight; Symptoms                 Electronically signed by:  Marcia Steele RD  02/04/22 11:27 EST

## 2022-02-04 NOTE — PLAN OF CARE
Malnutrition Severity Assessment    Patient Name:  Savannah Andres  YOB: 1935  MRN: [id number]  Admit Date:  2/3/2022    Patient meets criteria for : Severe Malnutrition    Comments:  Ordered Boost Plus TID in chocolate and Magic Cup TID in chocolate.    Malnutrition Severity Assessment  Malnutrition Type: Chronic Disease - Related Malnutrition  Malnutrition Type (last 8 hours)     Malnutrition Severity Assessment     Row Name 02/04/22 1123       Malnutrition Severity Assessment    Malnutrition Type Chronic Disease - Related Malnutrition    Row Name 02/04/22 1123       Unintentional Weight Loss     Unintentional Weight Loss Findings Severe    Unintentional Weight Loss  Weight loss greater than 5% in one month  15# weight loss in 1 month (-15%), 1/19/2021: 100#, 2/3/2022: 85#    Row Name 02/04/22 1123       Muscle Loss    Loss of Muscle Mass Findings Severe    [place] Region Severe - deep hollowing/scooping, lack of muscle to touch, facial bones well defined    Clavicle Bone Region Severe - protruding prominent bone    Acromion Bone Region Severe - squared shoulders, bones, and acromion process protrusion prominent    Dorsal Hand Region Moderate - slight depression    Row Name 02/04/22 1123       Fat Loss    Subcutaneous Fat Loss Findings Severe    Orbital Region  Severe - pronounced hollowness/depression, dark circles, loose saggy skin    Upper Arm Region Severe - mostly skin, very little space between folds, fingers touch    Row Name 02/04/22 1123       Criteria Met (Must meet criteria for severity in at least 2 of these categories: M Wasting, Fat Loss, Fluid, Secondary Signs, Wt. Status, Intake)    Patient meets criteria for  Severe Malnutrition                Electronically signed by:  Marcia Steele RD  02/04/22 11:30 ESTGoal Outcome Evaluation:

## 2022-02-04 NOTE — H&P
HISTORY AND PHYSICAL   [facility]        Date of Admission: 2/3/2022  Patient Identification:  Name: Savannah Andres  Age: 86 y.o.  Sex: female  :  1935  MRN: [id number]                     Primary Care Physician: Vasu Del Angel MD    Chief Complaint:  86 year old female who presented to the emergency room with confusion worse than her baseline; she has a history of dementia and [facility]; she has not been as interactive as usual and has also had diarrhea; she is not able to give any history; a daughter was present earlier    History of Present Illness:   As above    Past Medical History:  Past Medical History:   Diagnosis Date   • Bilateral leg edema    • CHB (complete heart block) ([facility])     3rd degree   • CHF (congestive heart failure) ([facility])    • Hypertension    • Loss of consciousness (HCC)    • Skin cyst    • SOB (shortness of breath)    • Symptomatic bradycardia    • Syncope      Past Surgical History:  Past Surgical History:   Procedure Laterality Date   • CARDIAC ELECTROPHYSIOLOGY PROCEDURE N/A 2016    Procedure: Pacemaker DC new  [employer];  Surgeon: Ananth Malcolm MD;  Location: [facility] INVASIVE LOCATION;  Service:    • PACEMAKER IMPLANTATION        Home Meds:  Medications Prior to Admission   Medication Sig Dispense Refill Last Dose   • furosemide (LASIX) 20 MG tablet Take 1 tablet by mouth Daily As Needed (swelling, shortness of breath). 90 tablet 0    • lisinopril (PRINIVIL,ZESTRIL) 5 MG tablet TAKE 1 AND 1/2 TABLET BY MOUTH DAILY (Patient taking differently: Take 7.5 mg by mouth Daily.) 135 tablet 1    • metoprolol succinate XL (TOPROL-XL) 50 MG 24 hr tablet Take 1 tablet by mouth Daily. 90 tablet 1    • potassium chloride (K-DUR,KLOR-CON) 10 MEQ CR tablet Take 1 tablet by mouth Daily. 30 tablet 0        Allergies:  No Known Allergies  Immunizations:  Immunization History   Administered Date(s) Administered   • Fluzone High Dose =>65 Years ([employer] ONLY)  "10/01/2016, 2017, 10/16/2018, 10/22/2019   • PPD Test 2017, 04/10/2018     Social History:   Social History     Social History Narrative   • Not on file     Social History     Socioeconomic History   • Marital status: Single   • Number of children: 3   Tobacco Use   • Smoking status: Never Smoker   • Smokeless tobacco: Never Used   • Tobacco comment: caffeine use   Substance and Sexual Activity   • Alcohol use: No   • Drug use: No   • Sexual activity: Defer       Family History:  Family History   Problem Relation Age of Onset   • Hypertension Mother    • Hypertension Father         Review of Systems  Not obtainable  Objective:  T Max 24 hrs: Temp (24hrs), Av.1 °F (36.7 °C), Min:97.6 °F (36.4 °C), Max:98.5 °F (36.9 °C)    Vitals Ranges:   Temp:  [97.6 °F (36.4 °C)-98.5 °F (36.9 °C)] 98.5 °F (36.9 °C)  Heart Rate:  [65-90] 79  Resp:  [18-22] 18  BP: (150-167)/(77-99) 152/99      Exam:  /99 (BP Location: Left arm, Patient Position: Sitting)   Pulse 79   Temp 98.5 °F (36.9 °C) (Oral)   Resp 18   Ht 157.5 cm (62\")   Wt 38.8 kg (85 lb 9.6 oz)   SpO2 98%   BMI 15.66 kg/m²     General Appearance:    Drowsy, chronically ill appearing   Head:    Normocephalic, without obvious abnormality, atraumatic; bitemporal wasting   Eyes:    PERRL, conjunctivae/corneas clear, EOM's intact, both eyes   Ears:    Normal external ear canals, both ears   Nose:   Nares normal, septum midline, mucosa normal, no drainage    or sinus tenderness   Throat:   Lips, mucosa, and tongue normal   Neck:   Supple, symmetrical, trachea midline, no adenopathy;     thyroid:  no enlargement/tenderness/nodules; no carotid    bruit or JVD   Back:     Symmetric, no curvature, ROM normal, no CVA tenderness   Lungs:     Clear to auscultation bilaterally, respirations unlabored   Chest Wall:    No tenderness or deformity    Heart:    Regular rate and rhythm, S1 and S2 normal, no murmur, rub   or gallop   Abdomen:     Soft, nontender, " bowel sounds active all four quadrants,     no masses, no hepatomegaly, no splenomegaly   Extremities:   Extremities normal, atraumatic, no cyanosis or edema   Pulses:   2+ and symmetric all extremities   Skin:   Skin color, texture, turgor normal, no rashes or lesions   Lymph nodes:   Cervical, supraclavicular, and axillary nodes normal   Neurologic:   CNII-XII intact, normal strength, sensation intact throughout      .    Data Review:  Labs in chart were reviewed.  WBC   Date Value Ref Range Status   02/03/2022 6.87 3.40 - 10.80 10*3/mm3 Final     Hemoglobin   Date Value Ref Range Status   02/03/2022 13.0 12.0 - 15.9 g/dL Final     Hematocrit   Date Value Ref Range Status   02/03/2022 39.6 34.0 - 46.6 % Final     Platelets   Date Value Ref Range Status   02/03/2022 275 140 - 450 10*3/mm3 Final     Sodium   Date Value Ref Range Status   02/03/2022 138 136 - 145 mmol/L Final     Potassium   Date Value Ref Range Status   02/03/2022 2.7 (L) 3.5 - 5.2 mmol/L Final     Comment:     Slight hemolysis detected by analyzer. Results may be affected.     Chloride   Date Value Ref Range Status   02/03/2022 95 (L) 98 - 107 mmol/L Final     CO2   Date Value Ref Range Status   02/03/2022 29.9 (H) 22.0 - 29.0 mmol/L Final     BUN   Date Value Ref Range Status   02/03/2022 11 8 - 23 mg/dL Final     Creatinine   Date Value Ref Range Status   02/03/2022 0.98 0.57 - 1.00 mg/dL Final     Glucose   Date Value Ref Range Status   02/03/2022 296 (H) 65 - 99 mg/dL Final     Calcium   Date Value Ref Range Status   02/03/2022 9.7 8.6 - 10.5 mg/dL Final     Magnesium   Date Value Ref Range Status   02/03/2022 1.8 1.6 - 2.4 mg/dL Final     AST (SGOT)   Date Value Ref Range Status   02/03/2022 21 1 - 32 U/L Final     Comment:     Slight hemolysis detected by analyzer. Results may be affected.     ALT (SGPT)   Date Value Ref Range Status   02/03/2022 20 1 - 33 U/L Final     Alkaline Phosphatase   Date Value Ref Range Status   02/03/2022 90 39 -  117 U/L Final                Imaging Results (All)     Procedure Component Value Units Date/Time    CT Head Without Contrast [[id number]] Collected: 02/03/22 1443     Updated: 02/03/22 1443    Narrative:      CT HEAD WITHOUT CONTRAST     HISTORY: Altered mental status.     COMPARISON: None.     FINDINGS: The brain ventricles are symmetrical. Confluent areas of  decreased attenuation are noted involving the white matter of the  cerebral hemispheres bilaterally. Appearance is consistent with  extensive small vessel ischemic disease. There is no evidence of acute  infarction or of intracranial hemorrhage. Further evaluation could be  performed with an MRI examination of the brain as indicated.        Radiation dose reduction techniques were utilized, including automated  exposure control and exposure modulation based on body size.             XR Chest 1 View [[id number]] Collected: 02/03/22 1238     Updated: 02/03/22 1242    Narrative:      PORTABLE CHEST 02/03/2022 AT 12:22 PM     CLINICAL HISTORY: Weakness     Compared to the previous chest x-ray dated 01/19/2022.     The lungs are well-expanded and appear free of focal infiltrates. There  are no pleural effusions. The heart is normal in size. A dual-chamber  pacemaker is in place in satisfactory position in the left subclavian  vein.     IMPRESSIONS: No evidence of acute disease within the chest.     This report was finalized on 2/3/2022 12:39 PM by Dr. Cristino Man M.D.           Patient Active Problem List   Diagnosis Code   • Third degree heart block (HCC) I44.2   • CHF (congestive heart failure), NYHA class III (HCC) I50.9   • Hyperglycemia R73.9   • Hypokalemia E87.6   • Acute midline low back pain without sciatica M54.50   • Inflamed sebaceous cyst L72.3   • Hypertension I10   • Presence of permanent cardiac pacemaker Z95.0   • Valvular heart disease I38   • Pulmonary hypertension (HCC) I27.20   • Hordeolum externum of right lower eyelid H00.012   •  Cardiomyopathy (HCC) I42.9   • Dementia with behavioral disturbance (HCC) F03.91       Assessment:  Active Hospital Problems    Diagnosis  POA   • Dementia with behavioral disturbance (HCC) [F03.91]  Yes      Resolved Hospital Problems   No resolved problems to display.   diarrhea  Hypokalemia  Diabetes  Hypertension  underweight    Plan:  Will replace potassium  Gentle fluids  Monitor on telemetry  Insulin sliding scale, accu checks  Expect return to the [facility] is planned  Stool studies ordered by the ED provider  [facility] ED provider    Nury Rodriguez MD  2/3/2022  19:27 EST

## 2022-02-04 NOTE — PLAN OF CARE
Goal Outcome Evaluation:              Outcome Summary: pt is confused. pt verbally agrassive, combative and uncoporative. pt threatening to kick and hit staff and wanting to leave. pt pulling lines.pt on restraints. Assist X1 to BSC. VSS stable. Pt receiving ordered Iv fluids.

## 2022-02-04 NOTE — NURSING NOTE
Pt daughter Savannah called. This RN spoke with daughter and updated on patient being on restraints and plan of care. Daughter voice no concern at this time.

## 2022-02-05 PROBLEM — E43 SEVERE MALNUTRITION (HCC): Status: ACTIVE | Noted: 2022-02-05

## 2022-02-05 LAB — GLUCOSE BLDC GLUCOMTR-MCNC: 111 MG/DL (ref 70–130)

## 2022-02-05 PROCEDURE — 25010000002 ZIPRASIDONE MESYLATE PER 10 MG: Performed by: HOSPITALIST

## 2022-02-05 PROCEDURE — 82962 GLUCOSE BLOOD TEST: CPT

## 2022-02-05 RX ADMIN — ZIPRASIDONE MESYLATE 20 MG: 20 INJECTION, POWDER, LYOPHILIZED, FOR SOLUTION INTRAMUSCULAR at 19:53

## 2022-02-05 RX ADMIN — LISINOPRIL 7.5 MG: 5 TABLET ORAL at 09:04

## 2022-02-05 RX ADMIN — METOPROLOL SUCCINATE 50 MG: 50 TABLET, EXTENDED RELEASE ORAL at 09:05

## 2022-02-05 RX ADMIN — POTASSIUM CHLORIDE 10 MEQ: 750 TABLET, EXTENDED RELEASE ORAL at 09:04

## 2022-02-05 NOTE — PROGRESS NOTES
Name: Savannah Andres ADMIT: 2/3/2022   : 1935  PCP: Vasu Del Angel MD    MRN: [id number] LOS: 2 days   AGE/SEX: 86 y.o. female  ROOM: [facility]   Subjective   Chief Complaint   Patient presents with   • Failure To Thrive      Awake and alert.  She has tangential speech and required frequent redirection.  I could not get her to answer all of my review of systems questions.  She did deny chest pain shortness of breath and peripheral pain but I could not get her to answer other questions reliably.    Objective   Vital Signs  Temp:  [96.7 °F (35.9 °C)-98.3 °F (36.8 °C)] 97.5 °F (36.4 °C)  Heart Rate:  [76-91] 83  Resp:  [18] 18  BP: (110-135)/(56-72) 110/72  SpO2:  [95 %-99 %] 95 %  on   ;   Device (Oxygen Therapy): room air  Body mass index is 15.64 kg/m².    Physical Exam  Vitals and nursing note reviewed.   Constitutional:       General: She is not in acute distress.     Appearance: She is ill-appearing (chronically). She is not diaphoretic.   HENT:      Head: Normocephalic and atraumatic.   Eyes:      General:         Right eye: No discharge.         Left eye: No discharge.      Conjunctiva/sclera: Conjunctivae normal.   Cardiovascular:      Rate and Rhythm: Normal rate and regular rhythm.      Pulses: Normal pulses.   Pulmonary:      Effort: Pulmonary effort is normal.      Breath sounds: No wheezing.   Abdominal:      General: There is no distension.      Palpations: Abdomen is soft.      Tenderness: There is no abdominal tenderness. There is no guarding or rebound.   Musculoskeletal:         General: No tenderness.      Cervical back: Neck supple. No tenderness.      Right lower leg: No edema.      Left lower leg: No edema.   Skin:     General: Skin is warm and dry.   Neurological:      Mental Status: She is alert. She is disoriented.   Psychiatric:         Speech: Speech is tangential.         Cognition and Memory: Cognition is impaired. Memory is impaired.         Results Review:       I reviewed the  patient's new clinical results.     I reviewed imaging, agree with interpretation.     I reviewed telemetry/EKG results, paced      Results from last 7 days   Lab Units 02/04/22  0327 02/03/22  1232   WBC 10*3/mm3 7.41 6.87   HEMOGLOBIN g/dL 13.0 13.0   PLATELETS 10*3/mm3 233 275     Results from last 7 days   Lab Units 02/04/22  0327 02/03/22  1232   SODIUM mmol/L 142 138   POTASSIUM mmol/L 4.4 2.7*   CHLORIDE mmol/L 107 95*   CO2 mmol/L 22.0 29.9*   BUN mg/dL 6* 11   CREATININE mg/dL 0.78 0.98   GLUCOSE mg/dL 150* 296*   Estimated Creatinine Clearance: 30.9 mL/min (by C-G formula based on SCr of 0.78 mg/dL).  Results from last 7 days   Lab Units 02/04/22  0327 02/03/22  1232   CALCIUM mg/dL 9.1 9.7   ALBUMIN g/dL  --  3.60   MAGNESIUM mg/dL  --  1.8          lisinopril, 7.5 mg, Oral, Daily  metoprolol succinate XL, 50 mg, Oral, Daily  potassium chloride, 10 mEq, Oral, Daily       Diet Regular; Cardiac    Assessment/Plan      Active Hospital Problems    Diagnosis  POA   • **Dementia with behavioral disturbance (HCC) [F03.91]  Yes   • Cardiomyopathy (HCC) [I42.9]  Yes   • Pulmonary hypertension (HCC) [I27.20]  Yes   • Hypertension [I10]  Yes   • Hypokalemia [E87.6]  Yes   • CHF (congestive heart failure), NYHA class III (HCC) [I50.9]  Yes      Resolved Hospital Problems   No resolved problems to display.       · Dementia with behavioral disturbance: Likely vascular dementia based on CT findings.  Check TSH and B12.  Consult psychiatry.  On Geodon as needed currently.  · History of third-degree heart block status post pacemaker, cardiomyopathy with chronic systolic heart failure: Not volume overloaded on exam.  Continue beta-blocker, ACE inhibitor.  Uses as needed diuretic as outpatient.  · Hypokalemia: Replaced.  Monitor.  · Hypertension: Monitor with above  · Hyperglycemia: Glucose level is improved today.  Check A1c.  · Severe malnutrition: Check phosphorus level.  Nutrition consulting.  · Disposition:  TBD    Jose Alejandro Sandra MD  [facility]  02/05/22  15:27 EST    Dictated portions using Dragon dictation software.    During the entire encounter, I was wearing recommended PPE including face mask and eye protection. Hand sanitization was performed prior to entering room and upon exit.

## 2022-02-05 NOTE — PLAN OF CARE
Goal Outcome Evaluation:  Plan of Care Reviewed With: patient        Progress: improving  Outcome Summary: VSS on room air. Pt is alert but disoriented to time,place and satuation. psychiatrist consulted.eating well. urniated normal. brief changed as needed. will continue to monitor. SCD's placed.

## 2022-02-06 LAB
ALBUMIN SERPL-MCNC: 3.1 G/DL (ref 3.5–5.2)
ANION GAP SERPL CALCULATED.3IONS-SCNC: 9.1 MMOL/L (ref 5–15)
BUN SERPL-MCNC: 24 MG/DL (ref 8–23)
BUN/CREAT SERPL: 23.8 (ref 7–25)
CALCIUM SPEC-SCNC: 9.2 MG/DL (ref 8.6–10.5)
CHLORIDE SERPL-SCNC: 106 MMOL/L (ref 98–107)
CO2 SERPL-SCNC: 26.9 MMOL/L (ref 22–29)
CREAT SERPL-MCNC: 1.01 MG/DL (ref 0.57–1)
DEPRECATED RDW RBC AUTO: 42.1 FL (ref 37–54)
ERYTHROCYTE [DISTWIDTH] IN BLOOD BY AUTOMATED COUNT: 12.7 % (ref 12.3–15.4)
GFR SERPL CREATININE-BSD FRML MDRD: 52 ML/MIN/1.73
GLUCOSE BLDC GLUCOMTR-MCNC: 155 MG/DL (ref 70–130)
GLUCOSE BLDC GLUCOMTR-MCNC: 171 MG/DL (ref 70–130)
GLUCOSE BLDC GLUCOMTR-MCNC: 191 MG/DL (ref 70–130)
GLUCOSE SERPL-MCNC: 159 MG/DL (ref 65–99)
HBA1C MFR BLD: 7.79 % (ref 4.8–5.6)
HCT VFR BLD AUTO: 38.4 % (ref 34–46.6)
HGB BLD-MCNC: 12.2 G/DL (ref 12–15.9)
MAGNESIUM SERPL-MCNC: 2 MG/DL (ref 1.6–2.4)
MCH RBC QN AUTO: 28.9 PG (ref 26.6–33)
MCHC RBC AUTO-ENTMCNC: 31.8 G/DL (ref 31.5–35.7)
MCV RBC AUTO: 91 FL (ref 79–97)
PHOSPHATE SERPL-MCNC: 3.9 MG/DL (ref 2.5–4.5)
PLATELET # BLD AUTO: 226 10*3/MM3 (ref 140–450)
PMV BLD AUTO: 10.2 FL (ref 6–12)
POTASSIUM SERPL-SCNC: 4 MMOL/L (ref 3.5–5.2)
RBC # BLD AUTO: 4.22 10*6/MM3 (ref 3.77–5.28)
SODIUM SERPL-SCNC: 142 MMOL/L (ref 136–145)
TSH SERPL DL<=0.05 MIU/L-ACNC: 1.36 UIU/ML (ref 0.27–4.2)
VIT B12 BLD-MCNC: 696 PG/ML (ref 211–946)
WBC NRBC COR # BLD: 6.41 10*3/MM3 (ref 3.4–10.8)

## 2022-02-06 PROCEDURE — 83735 ASSAY OF MAGNESIUM: CPT | Performed by: INTERNAL MEDICINE

## 2022-02-06 PROCEDURE — 80069 RENAL FUNCTION PANEL: CPT | Performed by: INTERNAL MEDICINE

## 2022-02-06 PROCEDURE — 83036 HEMOGLOBIN GLYCOSYLATED A1C: CPT | Performed by: INTERNAL MEDICINE

## 2022-02-06 PROCEDURE — 84443 ASSAY THYROID STIM HORMONE: CPT | Performed by: INTERNAL MEDICINE

## 2022-02-06 PROCEDURE — 82962 GLUCOSE BLOOD TEST: CPT

## 2022-02-06 PROCEDURE — 82607 VITAMIN B-12: CPT | Performed by: INTERNAL MEDICINE

## 2022-02-06 PROCEDURE — 63710000001 INSULIN LISPRO (HUMAN) PER 5 UNITS: Performed by: INTERNAL MEDICINE

## 2022-02-06 PROCEDURE — 85027 COMPLETE CBC AUTOMATED: CPT | Performed by: INTERNAL MEDICINE

## 2022-02-06 RX ORDER — NICOTINE POLACRILEX 4 MG
15 LOZENGE BUCCAL
Status: DISCONTINUED | OUTPATIENT
Start: 2022-02-06 | End: 2022-02-17 | Stop reason: HOSPADM

## 2022-02-06 RX ORDER — INSULIN LISPRO 100 [IU]/ML
0-7 INJECTION, SOLUTION INTRAVENOUS; SUBCUTANEOUS
Status: DISCONTINUED | OUTPATIENT
Start: 2022-02-06 | End: 2022-02-17 | Stop reason: HOSPADM

## 2022-02-06 RX ORDER — OLANZAPINE 2.5 MG/1
2.5 TABLET ORAL 2 TIMES DAILY
Status: DISCONTINUED | OUTPATIENT
Start: 2022-02-06 | End: 2022-02-07

## 2022-02-06 RX ORDER — QUETIAPINE FUMARATE 25 MG/1
25 TABLET, FILM COATED ORAL NIGHTLY
Status: DISCONTINUED | OUTPATIENT
Start: 2022-02-06 | End: 2022-02-06

## 2022-02-06 RX ORDER — DEXTROSE MONOHYDRATE 25 G/50ML
25 INJECTION, SOLUTION INTRAVENOUS
Status: DISCONTINUED | OUTPATIENT
Start: 2022-02-06 | End: 2022-02-17 | Stop reason: HOSPADM

## 2022-02-06 RX ORDER — OLANZAPINE 10 MG/1
5 INJECTION, POWDER, LYOPHILIZED, FOR SOLUTION INTRAMUSCULAR EVERY 8 HOURS PRN
Status: DISCONTINUED | OUTPATIENT
Start: 2022-02-06 | End: 2022-02-07

## 2022-02-06 RX ADMIN — OLANZAPINE 2.5 MG: 2.5 TABLET ORAL at 20:36

## 2022-02-06 RX ADMIN — LISINOPRIL 7.5 MG: 5 TABLET ORAL at 08:27

## 2022-02-06 RX ADMIN — INSULIN LISPRO 2 UNITS: 100 INJECTION, SOLUTION INTRAVENOUS; SUBCUTANEOUS at 17:48

## 2022-02-06 RX ADMIN — METOPROLOL SUCCINATE 50 MG: 50 TABLET, EXTENDED RELEASE ORAL at 08:27

## 2022-02-06 NOTE — PROGRESS NOTES
Name: Savannah Andres ADMIT: 2/3/2022   : 1935  PCP: Vasu Del Angel MD    MRN: [id number] LOS: 3 days   AGE/SEX: 86 y.o. female  ROOM: [facility]   Subjective   Chief Complaint   Patient presents with   • Failure To Thrive      Alert, calm, NAD. Poor historian. Still with some tangential speech so unable to get full ROS. She denied CP SOA nausea and abdominal pain.    Objective   Vital Signs  Temp:  [96.9 °F (36.1 °C)-97.9 °F (36.6 °C)] 96.9 °F (36.1 °C)  Heart Rate:  [67-73] 73  Resp:  [18] 18  BP: (105-148)/(58-79) 148/79  SpO2:  [97 %-98 %] 98 %  on   ;   Device (Oxygen Therapy): room air  Body mass index is 15.64 kg/m².    Physical Exam  Vitals and nursing note reviewed.   Constitutional:       General: She is not in acute distress.     Appearance: She is ill-appearing (chronically). She is not diaphoretic.   HENT:      Head: Normocephalic and atraumatic.   Eyes:      General:         Right eye: No discharge.         Left eye: No discharge.      Conjunctiva/sclera: Conjunctivae normal.   Cardiovascular:      Rate and Rhythm: Normal rate and regular rhythm.      Pulses: Normal pulses.   Pulmonary:      Effort: Pulmonary effort is normal.      Breath sounds: No wheezing.   Abdominal:      General: There is no distension.      Palpations: Abdomen is soft.      Tenderness: There is no abdominal tenderness. There is no guarding or rebound.   Musculoskeletal:         General: No tenderness.      Cervical back: Neck supple. No tenderness.      Right lower leg: No edema.      Left lower leg: No edema.   Skin:     General: Skin is warm and dry.   Neurological:      Mental Status: She is alert. She is disoriented.   Psychiatric:         Speech: Speech is tangential.         Cognition and Memory: Cognition is impaired. Memory is impaired.         Results Review:       I reviewed the patient's new clinical results.        Results from last 7 days   Lab Units 22  0629 22  0327 22  1232   WBC  10*3/mm3 6.41 7.41 6.87   HEMOGLOBIN g/dL 12.2 13.0 13.0   PLATELETS 10*3/mm3 226 233 275     Results from last 7 days   Lab Units 02/06/22 0629 02/04/22 0327 02/03/22  1232   SODIUM mmol/L 142 142 138   POTASSIUM mmol/L 4.0 4.4 2.7*   CHLORIDE mmol/L 106 107 95*   CO2 mmol/L 26.9 22.0 29.9*   BUN mg/dL 24* 6* 11   CREATININE mg/dL 1.01* 0.78 0.98   GLUCOSE mg/dL 159* 150* 296*   Estimated Creatinine Clearance: 24.5 mL/min (A) (by C-G formula based on SCr of 1.01 mg/dL (H)).  Results from last 7 days   Lab Units 02/06/22 0629 02/04/22 0327 02/03/22  1232   CALCIUM mg/dL 9.2 9.1 9.7   ALBUMIN g/dL 3.10*  --  3.60   MAGNESIUM mg/dL 2.0  --  1.8   PHOSPHORUS mg/dL 3.9  --   --           insulin lispro, 0-7 Units, Subcutaneous, TID AC  lisinopril, 7.5 mg, Oral, Daily  metoprolol succinate XL, 50 mg, Oral, Daily       Diet Regular; Cardiac    Assessment/Plan      Active Hospital Problems    Diagnosis  POA   • **Dementia with behavioral disturbance (HCC) [F03.91]  Yes   • Severe malnutrition (CMS/HCC) [E43]  Yes   • Cardiomyopathy (HCC) [I42.9]  Yes   • Pulmonary hypertension (HCC) [I27.20]  Yes   • Hypertension [I10]  Yes   • Hypokalemia [E87.6]  Yes   • CHF (congestive heart failure), NYHA class III (HCC) [I50.9]  Yes      Resolved Hospital Problems   No resolved problems to display.       · Dementia with behavioral disturbance: Likely vascular dementia based on CT findings.  TSH and B12 ok.  Did receive geodon yesterday evening x1. Psychiatry consulted, will order seroquel this evening pending their evaluation.   · History of third-degree heart block status post pacemaker, cardiomyopathy with chronic systolic heart failure: Not volume overloaded on exam.  Continue beta-blocker, ACE inhibitor.  Uses as needed diuretic as outpatient.  · Hypokalemia: Replaced.  Monitor.  · Hypertension: Acceptable acutely  · Diabetes: A1c 7.7 and she had hyperglycemia on admission. Will start SSI low dose and monitor  requirements.  · Severe malnutrition/FTT: Nutrition consulting.  · Disposition: TBD    Jose Alejandro Sandra MD  [facility]  02/06/22  14:03 EST    Dictated portions using Dragon dictation software.    During the entire encounter, I was wearing recommended PPE including face mask and eye protection. Hand sanitization was performed prior to entering room and upon exit.

## 2022-02-06 NOTE — CONSULTS
IDENTIFYING INFORMATION: The patient is an 86-year-old white female with a history of dementia admitted after a 2 to 3-week period of failure to thrive reduced appetite and worsening confusion    CHIEF COMPLAINT: I am in iron    INFORMANT: Patient and chart    RELIABILITY: Limited    HISTORY OF PRESENT ILLNESS: The patient is an 86-year-old white female with a history of dementia.  She has been a resident at [facility].  She was admitted on 2/3/2022 with increasing confusion and failure to thrive, reduced appetite and sleep etc.  The patient has a history of a COVID infection diagnosed approximately 2 weeks prior to admission.  When seen today she is in no restraint devices but is thoroughly confused.  She has required as needed doses of ziprasidone and Haldol since admission.  She is not on any other psychotropic medication and is not on a cholinesterase inhibitor.    PAST PSYCHIATRIC HISTORY: As above    PAST MEDICAL HISTORY: Significant for COVID-19 infection, bilateral leg edema, complete heart block third-degree, congestive heart failure, hypertension, bradycardia    MEDICATIONS:   Current Facility-Administered Medications   Medication Dose Route Frequency Provider Last Rate Last Admin   • acetaminophen (TYLENOL) tablet 650 mg  650 mg Oral Q4H PRN Nury Rodriguez MD       • dextrose (D50W) (25 g/50 mL) IV injection 25 g  25 g Intravenous Q15 Min PRN Jose Alejandro Sandra MD       • dextrose (GLUTOSE) oral gel 15 g  15 g Oral Q15 Min PRN Jose Alejandro Sandra MD       • glucagon (human recombinant) (GLUCAGEN DIAGNOSTIC) injection 1 mg  1 mg Subcutaneous Q15 Min PRN Jose Alejandro Sandra MD       • insulin lispro (ADMELOG) injection 0-7 Units  0-7 Units Subcutaneous TID AC Jose Alejandro Sandra MD       • lisinopril (PRINIVIL,ZESTRIL) tablet 7.5 mg  7.5 mg Oral Daily Nury Rodriguez MD   7.5 mg at 02/06/22 0827   • melatonin tablet 3 mg  3 mg Oral Nightly PRN Nury Rodriguez MD       • metoprolol  "succinate XL (TOPROL-XL) 24 hr tablet 50 mg  50 mg Oral Daily Nury Rodriguez MD   50 mg at 02/06/22 0827   • nitroglycerin (NITROSTAT) SL tablet 0.4 mg  0.4 mg Sublingual Q5 Min PRN Nury Rodriguez MD       • OLANZapine (zyPREXA) injection 5 mg  5 mg Intramuscular Q8H PRN Brian Grande III, MD       • OLANZapine (zyPREXA) tablet 2.5 mg  2.5 mg Oral BID Brian Grande III, MD       • ondansetron (ZOFRAN) tablet 4 mg  4 mg Oral Q6H PRN Nury Rodriguez MD        Or   • ondansetron (ZOFRAN) injection 4 mg  4 mg Intravenous Q6H PRN Nury Rodriguez MD       • potassium chloride (K-DUR,KLOR-CON) ER tablet 40 mEq  40 mEq Oral PRN Nury Rodriguez MD   40 mEq at 02/03/22 2312    Or   • potassium chloride (KLOR-CON) packet 40 mEq  40 mEq Oral PRN Nury Rodriguez MD        Or   • potassium chloride 10 mEq in 100 mL IVPB  10 mEq Intravenous Q1H PRN Nury Rodriguez MD             ALLERGIES: None    FAMILY HISTORY: Noncontributory    SOCIAL HISTORY: No reported use of alcohol, tobacco, or street drugs    MENTAL STATUS EXAM: The patient is a thin white female appearing her stated age.  She is dressed in hospital garb with no restraint devices in place.  She is awake alert and oriented to person only, identifying the place is \"[place]". Her mood is slightly irritable her affect congruent.  Speech is tangential and actually exhibits some clanging.  The patient is unwilling to comply with formal testing of memory and cognition.  Her judgment insight appear to be impaired.  She does exhibit some paranoid thinking and her judgment insight are greatly impaired.    ASSETS/LIABILITIES: To be assessed    DIAGNOSTIC IMPRESSION: Primary dementia Alzheimer's type with behavioral disturbance, metabolic encephalopathy secondary to COVID-19 superimposed, medical problems described previously    PLAN: For now I have added scheduled and as needed doses of " Zyprexa.  I would not recommend continued use of Geodon given the patient's elevated QT interval of 471.  Upward titration of Zyprexa can take place and if this medication is not effective, we can consider haloperidol and possible addition of valproic acid.  I will continue to follow with you.

## 2022-02-07 PROBLEM — E11.9 TYPE 2 DIABETES MELLITUS WITHOUT COMPLICATION, WITHOUT LONG-TERM CURRENT USE OF INSULIN (HCC): Status: ACTIVE | Noted: 2022-02-07

## 2022-02-07 LAB
ANION GAP SERPL CALCULATED.3IONS-SCNC: 12 MMOL/L (ref 5–15)
BUN SERPL-MCNC: 16 MG/DL (ref 8–23)
BUN/CREAT SERPL: 21.1 (ref 7–25)
CALCIUM SPEC-SCNC: 9.5 MG/DL (ref 8.6–10.5)
CHLORIDE SERPL-SCNC: 107 MMOL/L (ref 98–107)
CO2 SERPL-SCNC: 25 MMOL/L (ref 22–29)
CREAT SERPL-MCNC: 0.76 MG/DL (ref 0.57–1)
DEPRECATED RDW RBC AUTO: 41.5 FL (ref 37–54)
ERYTHROCYTE [DISTWIDTH] IN BLOOD BY AUTOMATED COUNT: 12.5 % (ref 12.3–15.4)
GFR SERPL CREATININE-BSD FRML MDRD: 72 ML/MIN/1.73
GLUCOSE BLDC GLUCOMTR-MCNC: 103 MG/DL (ref 70–130)
GLUCOSE BLDC GLUCOMTR-MCNC: 175 MG/DL (ref 70–130)
GLUCOSE BLDC GLUCOMTR-MCNC: 189 MG/DL (ref 70–130)
GLUCOSE BLDC GLUCOMTR-MCNC: 76 MG/DL (ref 70–130)
GLUCOSE BLDC GLUCOMTR-MCNC: 99 MG/DL (ref 70–130)
GLUCOSE SERPL-MCNC: 97 MG/DL (ref 65–99)
HCT VFR BLD AUTO: 39.5 % (ref 34–46.6)
HGB BLD-MCNC: 12.9 G/DL (ref 12–15.9)
MCH RBC QN AUTO: 29.5 PG (ref 26.6–33)
MCHC RBC AUTO-ENTMCNC: 32.7 G/DL (ref 31.5–35.7)
MCV RBC AUTO: 90.4 FL (ref 79–97)
PLATELET # BLD AUTO: 254 10*3/MM3 (ref 140–450)
PMV BLD AUTO: 10.3 FL (ref 6–12)
POTASSIUM SERPL-SCNC: 3.9 MMOL/L (ref 3.5–5.2)
QT INTERVAL: 445 MS
RBC # BLD AUTO: 4.37 10*6/MM3 (ref 3.77–5.28)
SODIUM SERPL-SCNC: 144 MMOL/L (ref 136–145)
WBC NRBC COR # BLD: 7.35 10*3/MM3 (ref 3.4–10.8)

## 2022-02-07 PROCEDURE — 82962 GLUCOSE BLOOD TEST: CPT

## 2022-02-07 PROCEDURE — 93010 ELECTROCARDIOGRAM REPORT: CPT | Performed by: INTERNAL MEDICINE

## 2022-02-07 PROCEDURE — 25010000002 HALOPERIDOL LACTATE PER 5 MG: Performed by: SPECIALIST

## 2022-02-07 PROCEDURE — 85027 COMPLETE CBC AUTOMATED: CPT | Performed by: INTERNAL MEDICINE

## 2022-02-07 PROCEDURE — 63710000001 INSULIN LISPRO (HUMAN) PER 5 UNITS: Performed by: INTERNAL MEDICINE

## 2022-02-07 PROCEDURE — 80048 BASIC METABOLIC PNL TOTAL CA: CPT | Performed by: INTERNAL MEDICINE

## 2022-02-07 PROCEDURE — 93005 ELECTROCARDIOGRAM TRACING: CPT | Performed by: INTERNAL MEDICINE

## 2022-02-07 RX ORDER — HALOPERIDOL 5 MG/ML
5 INJECTION INTRAMUSCULAR EVERY 6 HOURS PRN
Status: DISCONTINUED | OUTPATIENT
Start: 2022-02-07 | End: 2022-02-17 | Stop reason: HOSPADM

## 2022-02-07 RX ORDER — HALOPERIDOL 2 MG/1
2 TABLET ORAL 3 TIMES DAILY
Status: DISCONTINUED | OUTPATIENT
Start: 2022-02-07 | End: 2022-02-08

## 2022-02-07 RX ADMIN — OLANZAPINE 5 MG: 10 INJECTION, POWDER, FOR SOLUTION INTRAMUSCULAR at 05:13

## 2022-02-07 RX ADMIN — INSULIN LISPRO 2 UNITS: 100 INJECTION, SOLUTION INTRAVENOUS; SUBCUTANEOUS at 17:19

## 2022-02-07 RX ADMIN — INSULIN LISPRO 2 UNITS: 100 INJECTION, SOLUTION INTRAVENOUS; SUBCUTANEOUS at 08:34

## 2022-02-07 RX ADMIN — LISINOPRIL 7.5 MG: 5 TABLET ORAL at 08:34

## 2022-02-07 RX ADMIN — HALOPERIDOL 2 MG: 2 TABLET ORAL at 12:37

## 2022-02-07 RX ADMIN — METOPROLOL SUCCINATE 50 MG: 50 TABLET, EXTENDED RELEASE ORAL at 08:35

## 2022-02-07 RX ADMIN — HALOPERIDOL LACTATE 5 MG: 5 INJECTION, SOLUTION INTRAMUSCULAR at 16:29

## 2022-02-07 RX ADMIN — OLANZAPINE 2.5 MG: 2.5 TABLET ORAL at 08:34

## 2022-02-07 NOTE — PROGRESS NOTES
Discharge Planning Assessment  [facility]     Patient Name: Savannah Andres  MRN: [id number]  Today's Date: 2/7/2022    Admit Date: 2/3/2022     Discharge Needs Assessment     Row Name 02/07/22 3095       Living Environment    Lives With facility resident    Name(s) of Who Lives With Patient Raffi    Current Living Arrangements independent/assisted living facility    Provides Primary Care For no one, unable/limited ability to care for self    Family Caregiver if Needed child(kevon), adult    Quality of Family Relationships involved    Able to Return to Prior Arrangements yes    Living Arrangement Comments Spoke w/ Nicole/Raffi, pt is from assisted living w/ med management, but [facility] will accept back in memory care if needed.       Resource/Environmental Concerns    Resource/Environmental Concerns none       Transition Planning    Patient/Family Anticipates Transition to long-term care facility       Discharge Needs Assessment    Readmission Within the Last 30 Days no previous admission in last 30 days    Equipment Currently Used at Home none    Concerns to be Addressed adjustment to diagnosis/illness    Discharge Facility/Level of Care Needs assisted living facility; nursing facility, skilled; other (see comments)  memory care               Discharge Plan     Row Name 02/07/22 7301       Plan    Plan [facility]    Plan Comments Left message for pt's daughter, Savannah [phone], awaiting call back.  Nicole w/ Raffi notified of admission, [phone], pt is from Assisted Living, [facility] has been assisting pt w/ medication management. Pt may return to [facility] as long as she is able to ambulate. Memory Care available at [facility] if needed. CCP to notify Nicole once pt is ready to dc. CCP will confirm dc plan with family.              Continued Care and Services - Admitted Since 2/3/2022    Coordination has not been started for this encounter.          Demographic Summary    No documentation.                Functional Status     Row Name  02/07/22 1354       Functional Status    Usual Activity Tolerance moderate       Functional Status, IADL    Medications completely dependent    Meal Preparation assistive person    Housekeeping assistive person    Laundry assistive person    Shopping completely dependent       Mental Status Summary    Recent Changes in Mental Status/Cognitive Functioning unable to assess               Psychosocial    No documentation.                Abuse/Neglect    No documentation.                Legal    No documentation.                Substance Abuse    No documentation.                Patient Forms    No documentation.                   Monica Mcgowan RN

## 2022-02-07 NOTE — PROGRESS NOTES
Name: Savannah Andres ADMIT: 2/3/2022   : 1935  PCP: Vasu Del Angel MD    MRN: [id number] LOS: 4 days   AGE/SEX: 86 y.o. female  ROOM: [facility]     Subjective   Subjective   Denies SOA or pain. Says she is eating well. Wanders away from topic easily. Says she's here bc she ate some bad sweets from a consignment shop.    Review of Systems   Unable to perform ROS: Dementia        Objective   Objective   Vital Signs  Temp:  [96.7 °F (35.9 °C)-97.6 °F (36.4 °C)] 97.2 °F (36.2 °C)  Heart Rate:  [63-93] 93  Resp:  [16-18] 18  BP: (107-177)/(59-88) 145/88  SpO2:  [98 %] 98 %  on   ;   Device (Oxygen Therapy): room air  Body mass index is 14.61 kg/m².  Physical Exam  Vitals and nursing note reviewed.   Constitutional:       General: She is not in acute distress.     Appearance: She is ill-appearing (chronically). She is not toxic-appearing or diaphoretic.   HENT:      Head: Normocephalic and atraumatic.      Right Ear: External ear normal.      Left Ear: External ear normal.      Mouth/Throat:      Mouth: Mucous membranes are moist.      Pharynx: Oropharynx is clear.   Eyes:      General: No scleral icterus.        Right eye: No discharge.         Left eye: No discharge.      Extraocular Movements: Extraocular movements intact.      Conjunctiva/sclera: Conjunctivae normal.   Cardiovascular:      Rate and Rhythm: Normal rate and regular rhythm.      Pulses: Normal pulses.      Heart sounds: Normal heart sounds.   Pulmonary:      Effort: Pulmonary effort is normal. No respiratory distress.      Breath sounds: Normal breath sounds. No wheezing or rales.   Abdominal:      General: Bowel sounds are normal. There is no distension.      Palpations: Abdomen is soft.      Tenderness: There is no abdominal tenderness.   Musculoskeletal:         General: No swelling. Normal range of motion.      Cervical back: Neck supple. No rigidity.   Lymphadenopathy:      Cervical: No cervical adenopathy.   Skin:     General: Skin is  warm and dry.      Capillary Refill: Capillary refill takes less than 2 seconds.      Coloration: Skin is not jaundiced.      Findings: No rash.   Neurological:      Mental Status: She is alert. Mental status is at baseline.      Cranial Nerves: No cranial nerve deficit.      Coordination: Coordination normal.      Comments: Moves all 4 equally   Psychiatric:         Attention and Perception: Attention normal.         Mood and Affect: Mood normal.         Speech: Speech is tangential.         Behavior: Behavior is uncooperative.         Cognition and Memory: Memory is impaired.         Judgment: Judgment is impulsive.         Results Review     I reviewed the patient's new clinical results.  Results from last 7 days   Lab Units 02/07/22  1020 02/06/22 0629 02/04/22 0327 02/03/22  1232   WBC 10*3/mm3 7.35 6.41 7.41 6.87   HEMOGLOBIN g/dL 12.9 12.2 13.0 13.0   PLATELETS 10*3/mm3 254 226 233 275     Results from last 7 days   Lab Units 02/07/22 1020 02/06/22 0629 02/04/22 0327 02/03/22  1232   SODIUM mmol/L 144 142 142 138   POTASSIUM mmol/L 3.9 4.0 4.4 2.7*   CHLORIDE mmol/L 107 106 107 95*   CO2 mmol/L 25.0 26.9 22.0 29.9*   BUN mg/dL 16 24* 6* 11   CREATININE mg/dL 0.76 1.01* 0.78 0.98   GLUCOSE mg/dL 97 159* 150* 296*   EGFR IF NONAFRICN AM mL/min/1.73 72 52* 70 54*     Results from last 7 days   Lab Units 02/06/22 0629 02/03/22  1232   ALBUMIN g/dL 3.10* 3.60   BILIRUBIN mg/dL  --  0.5   ALK PHOS U/L  --  90   AST (SGOT) U/L  --  21   ALT (SGPT) U/L  --  20     Results from last 7 days   Lab Units 02/07/22  1020 02/06/22 0629 02/04/22 0327 02/03/22  1232   CALCIUM mg/dL 9.5 9.2 9.1 9.7   ALBUMIN g/dL  --  3.10*  --  3.60   MAGNESIUM mg/dL  --  2.0  --  1.8   PHOSPHORUS mg/dL  --  3.9  --   --        COVID19   Date Value Ref Range Status   01/19/2022 Detected (C) Not Detected - Ref. Range Final     Hemoglobin A1C   Date/Time Value Ref Range Status   02/06/2022 0629 7.79 (H) 4.80 - 5.60 % Final     Glucose    Date/Time Value Ref Range Status   02/07/2022 1056 99 70 - 130 mg/dL Final     Comment:     Meter: [id number] : [id number] Vielka Thompson CNA   02/07/2022 0627 175 (H) 70 - 130 mg/dL Final     Comment:     Meter: [id number] : [id number] Ricardo Jayashree PCA   02/06/2022 2028 155 (H) 70 - 130 mg/dL Final     Comment:     Meter: [id number] : [id number] Silva Jayashree PCA   02/06/2022 1614 191 (H) 70 - 130 mg/dL Final     Comment:     Meter: [id number] : [id number] Aarober Angel NA   02/06/2022 0658 171 (H) 70 - 130 mg/dL Final     Comment:     Meter: [id number] : [id number] Ponce Alvarado NA   02/05/2022 0658 111 70 - 130 mg/dL Final     Comment:     Meter: [id number] : [id number] Ponce Alvarado NA       CT Head Without Contrast  CT HEAD WITHOUT CONTRAST     HISTORY: Altered mental status.     COMPARISON: None.     FINDINGS: The brain ventricles are symmetrical. Confluent areas of  decreased attenuation are noted involving the white matter of the  cerebral hemispheres bilaterally. Appearance is consistent with  extensive small vessel ischemic disease. There is no evidence of acute  infarction or of intracranial hemorrhage. Further evaluation could be  performed with an MRI examination of the brain as indicated.        Radiation dose reduction techniques were utilized, including automated  exposure control and exposure modulation based on body size.     This report was finalized on 2/4/2022 8:03 AM by Dr. Luis Carlos Steward M.D.       Scheduled Medications  haloperidol, 2 mg, Oral, TID  insulin lispro, 0-7 Units, Subcutaneous, TID AC  lisinopril, 7.5 mg, Oral, Daily  metoprolol succinate XL, 50 mg, Oral, Daily    Infusions   Diet  Diet Regular; Cardiac, Consistent Carbohydrate       Assessment/Plan     Active Hospital Problems    Diagnosis  POA   • **Dementia with behavioral disturbance (HCC) [F03.91]  Yes   • Type 2 diabetes mellitus without complication, without long-term current use of insulin (HCC) [E11.9]  Yes    • Severe malnutrition (CMS/HCC) [E43]  Yes   • Cardiomyopathy (HCC) [I42.9]  Yes   • Pulmonary hypertension (HCC) [I27.20]  Yes   • Hypertension [I10]  Yes   • Hypokalemia [E87.6]  Yes   • CHF (congestive heart failure), NYHA class III (HCC) [I50.9]  Yes      Resolved Hospital Problems   No resolved problems to display.       86 y.o. female admitted with Dementia with behavioral disturbance (HCC).    · Dementia with behavioral disturbance: Likely vascular dementia based on CT findings. TSH and B12 okay. Treated initially with Geodon, but stopped by Psychiatry due to potential QT issues. Changed to Zyprexa. Still not showing any improvement really. Today Psychiatry has changed Zyprexa to Haldol--considering Depakote. ?Terminal agitation. Still requiring restraints as she keeps trying to get OOB.   · History of 3deg heart block s/p PPM, cardiomyopathy, and chronic systolic CHF: Not volume overloaded on exam.  Continue beta-blocker, ACE inhibitor. Uses as-needed diuretic as outpatient.  · Hypokalemia: Replaced. Monitor.  · Hypertension: BPs acceptable on Lisinopril and Metoprolol.  · Diabetes (new diagnosis): A1c 7.7 and she had hyperglycemia on admission. Started SSI low dose and will monitor requirements.  · Severe malnutrition/FTT: Nutrition consulted.      · SCDs for DVT prophylaxis.  · RN d/w family and they would like code status changed to limited code. Confirmed with 2 staff members.   · Discussed with patient, nursing staff, CCP and care team on multidisciplinary rounds.  · Anticipate discharge to SNU facility, timing yet to be determined.      Collin Reyes MD  [facility]  02/07/22  12:15 EST

## 2022-02-07 NOTE — PLAN OF CARE
Goal Outcome Evaluation:  Plan of Care Reviewed With: patient        Progress: declining  Outcome Summary: Soft nonviolent wrist restraints restarted, pt remains restless and agitated. Kicking legs and swinging arms. Pt verbally abusive. VSS, remains on ra. Prn IM zyprexa given for increasing agitation. Up to bedside commode assist x2. Will continue to monitor closely.

## 2022-02-07 NOTE — NURSING NOTE
Approx 2200 two point restraints ordered d/t pt becoming increasingly agitated and uncooperative. Pt swinging arms and swatted at staff members. RN attempted to reorient and redirect pt with no success. Security called twice to help. Scheduled zyprexa already given. Two point restraints continued MERYL Ramirez. RN notified son Collin. Will continue to monitor closely.

## 2022-02-07 NOTE — SIGNIFICANT NOTE
02/07/22 1604   OTHER   Discipline physical therapist   Rehab Time/Intention   Session Not Performed other (see comments); unable to evaluate, medical status change  (Pt not appropriate for PT eval at this time, per RN. Pt in restraints and very agitated. Per chart pt has been kicking, hitting and verbally abusive towards staff. PT will follow up tomorrow if appropriate.)   Recommendation   PT - Next Appointment 02/08/22

## 2022-02-07 NOTE — PROGRESS NOTES
The patient's agitation persist and has not been responsive to Zyprexa.  After speaking with the nurse, I have discontinued Zyprexa and will start scheduled and as needed Haldol.  May consider addition of valproic acid, also.  The patient is in soft restraints and is responding to internal stimuli and does not participate in interview today.

## 2022-02-07 NOTE — PLAN OF CARE
Problem: Adult Inpatient Plan of Care  Goal: Plan of Care Review  Outcome: Ongoing, Progressing  Flowsheets (Taken 2/7/2022 1723)  Progress: no change  Plan of Care Reviewed With: patient  Outcome Summary: VSS, room air, turns self, alert to self, agitated and restless throughout most of the day, having conversations with her children who were not here,  has not slept, good response to Haldol but still slightly restless,  restraints dc'd, encourage po intake.  Goal: Patient-Specific Goal (Individualized)  Outcome: Ongoing, Progressing  Goal: Absence of Hospital-Acquired Illness or Injury  Outcome: Ongoing, Progressing  Intervention: Identify and Manage Fall Risk  Recent Flowsheet Documentation  Taken 2/7/2022 1553 by Christina Patino RN  Safety Promotion/Fall Prevention:   assistive device/personal items within reach   activity supervised   clutter free environment maintained   fall prevention program maintained   nonskid shoes/slippers when out of bed   room organization consistent   safety round/check completed  Taken 2/7/2022 1407 by Christina Patino RN  Safety Promotion/Fall Prevention:   activity supervised   assistive device/personal items within reach   clutter free environment maintained   fall prevention program maintained   nonskid shoes/slippers when out of bed   safety round/check completed   room organization consistent  Taken 2/7/2022 1200 by Christina Patino RN  Safety Promotion/Fall Prevention:   activity supervised   assistive device/personal items within reach   clutter free environment maintained   fall prevention program maintained   nonskid shoes/slippers when out of bed   room organization consistent   safety round/check completed  Taken 2/7/2022 1007 by Christina Patino RN  Safety Promotion/Fall Prevention:   assistive device/personal items within reach   clutter free environment maintained   activity supervised   fall prevention program maintained   nonskid shoes/slippers when  out of bed   room organization consistent   safety round/check completed  Taken 2/7/2022 0835 by Christina Patino RN  Safety Promotion/Fall Prevention:   activity supervised   assistive device/personal items within reach   clutter free environment maintained   fall prevention program maintained   nonskid shoes/slippers when out of bed   room organization consistent   safety round/check completed  Intervention: Prevent Skin Injury  Recent Flowsheet Documentation  Taken 2/7/2022 1721 by Christina Patino RN  Body Position: side-lying, left  Taken 2/7/2022 1553 by Christina Patino RN  Body Position: side-lying, right  Taken 2/7/2022 1407 by Christina Patino RN  Body Position: (moves around bed on her own)   supine   weight shift assistance provided  Taken 2/7/2022 1200 by Christina Patino RN  Body Position: supine  Taken 2/7/2022 1007 by Christina Patino RN  Body Position: tilted, left  Taken 2/7/2022 0835 by Christina Patino RN  Body Position:   position changed independently   supine   weight shift assistance provided  Intervention: Prevent and Manage VTE (venous thromboembolism) Risk  Recent Flowsheet Documentation  Taken 2/7/2022 0835 by Christina Patino RN  VTE Prevention/Management:   bilateral   sequential compression devices on  Goal: Optimal Comfort and Wellbeing  Outcome: Ongoing, Progressing  Goal: Readiness for Transition of Care  Outcome: Ongoing, Progressing     Problem: Fall Injury Risk  Goal: Absence of Fall and Fall-Related Injury  Outcome: Ongoing, Progressing  Intervention: Promote Injury-Free Environment  Recent Flowsheet Documentation  Taken 2/7/2022 1553 by Christina Patino RN  Safety Promotion/Fall Prevention:   assistive device/personal items within reach   activity supervised   clutter free environment maintained   fall prevention program maintained   nonskid shoes/slippers when out of bed   room organization consistent   safety round/check completed  Taken 2/7/2022  1407 by Christina Patino RN  Safety Promotion/Fall Prevention:   activity supervised   assistive device/personal items within reach   clutter free environment maintained   fall prevention program maintained   nonskid shoes/slippers when out of bed   safety round/check completed   room organization consistent  Taken 2/7/2022 1200 by Christina Patino RN  Safety Promotion/Fall Prevention:   activity supervised   assistive device/personal items within reach   clutter free environment maintained   fall prevention program maintained   nonskid shoes/slippers when out of bed   room organization consistent   safety round/check completed  Taken 2/7/2022 1007 by Christina Patino RN  Safety Promotion/Fall Prevention:   assistive device/personal items within reach   clutter free environment maintained   activity supervised   fall prevention program maintained   nonskid shoes/slippers when out of bed   room organization consistent   safety round/check completed  Taken 2/7/2022 0835 by Christina Patino RN  Safety Promotion/Fall Prevention:   activity supervised   assistive device/personal items within reach   clutter free environment maintained   fall prevention program maintained   nonskid shoes/slippers when out of bed   room organization consistent   safety round/check completed     Problem: Skin Injury Risk Increased  Goal: Skin Health and Integrity  Outcome: Ongoing, Progressing  Intervention: Optimize Skin Protection  Recent Flowsheet Documentation  Taken 2/7/2022 1721 by Christina Patino RN  Head of Bed (HOB): HOB at 15 degrees  Taken 2/7/2022 1553 by Christina Patino RN  Head of Bed (HOB): HOB at 15 degrees  Taken 2/7/2022 1407 by Christina Patino RN  Head of Bed (HOB): HOB at 15 degrees  Taken 2/7/2022 1200 by Christina Patino RN  Head of Bed (HOB): HOB at 15 degrees  Taken 2/7/2022 1007 by Christina Patino RN  Head of Bed (HOB): HOB at 15 degrees  Taken 2/7/2022 0835 by Christina Patino  RN  Pressure Reduction Techniques: frequent weight shift encouraged  Head of Bed (HOB): HOB at 30-45 degrees  Pressure Reduction Devices: alternating pressure pump (ADD)     Problem: Restraint, Nonbehavioral (Nonviolent)  Goal: Discontinuation Criteria Achieved  Outcome: Ongoing, Progressing  Goal: Personal Dignity and Safety Maintained  Outcome: Ongoing, Progressing  Intervention: Protect Skin and Joint Integrity  Recent Flowsheet Documentation  Taken 2/7/2022 1721 by Christina Patino RN  Body Position: side-lying, left  Taken 2/7/2022 1553 by Christina Patino RN  Body Position: side-lying, right  Taken 2/7/2022 1407 by Christina Patino RN  Body Position: (moves around bed on her own)   supine   weight shift assistance provided  Taken 2/7/2022 1200 by Christina Patino RN  Body Position: supine  Taken 2/7/2022 1007 by Christina Patino RN  Body Position: tilted, left  Taken 2/7/2022 0835 by Christina Patino RN  Body Position:   position changed independently   supine   weight shift assistance provided     Problem: Malnutrition  Goal: Improved Nutritional Intake  Outcome: Ongoing, Progressing   Goal Outcome Evaluation:  Plan of Care Reviewed With: patient        Progress: no change  Outcome Summary: VSS, room air, turns self, alert to self, agitated and restless throughout most of the day, having conversations with her children who were not here,  has not slept, good response to Haldol but still slightly restless,  restraints dc'd, encourage po intake.

## 2022-02-07 NOTE — PROGRESS NOTES
Adult Nutrition  Assessment/PES    Patient Name:  Savannah Andres  YOB: 1935  MRN: [id number]  Admit Date:  2/3/2022    Assessment Date:  2/7/2022    Comments:  F/u MST 3, BMI. Pt average intake 60% x 5 meals. Pt hemoglobin A1C 7.79. Diet was changed to regular; cardiac, consistent carbohydrate. Canceled Boost Plus TID and ordered Boost Glucose Control TID. Changed Magic Cup from TID to BID. Continue to follow.     Reason for Assessment     Row Name 02/07/22 0845          Reason for Assessment    Reason For Assessment follow-up protocol     Diagnosis other (see comments)  admit w/: memory problem, heart failure, low blood potassium, high blood pressure disorder, pulomary hypertension, heart muscle disorder, severe malnutrition, hx: complete heart block, elevated bg, acute midline low back pain, pacemaker     Identified At Risk by Screening Criteria MST SCORE 2+; BMI                Nutrition/Diet History     Row Name 02/07/22 0855          Nutrition/Diet History    Typical Food/Fluid Intake 60% average intake x 5 meals.                Anthropometrics     Row Name 02/07/22 0500          Anthropometrics    Weight 36.2 kg (79 lb 14.4 oz)                Labs/Tests/Procedures/Meds     Row Name 02/07/22 0906          Labs/Procedures/Meds    Lab Results Reviewed reviewed     Lab Results Comments glucose (high), BUN (high), creatinine (high), hemoglobin a1c (high) 7.79, albumin (low)            Diagnostic Tests/Procedures    Diagnostic Test/Procedure Reviewed reviewed            Medications    Pertinent Medications Reviewed reviewed     Pertinent Medications Comments insulin, lisinopril, metoprolol succinate, olanzapine, PRN: olanzapine, potassium chloride                    Nutrition Prescription Ordered     Row Name 02/07/22 0908          Nutrition Prescription PO    Current PO Diet Regular     Supplement Boost Plus (Ensure Enlive, Ensure Plus); Magic Cup     Supplement Frequency 3 times a day     Common  Modifiers Cardiac; Consistent Carbohydrate                Evaluation of Received Nutrient/Fluid Intake     Row Name 02/07/22 0909          PO Evaluation    % PO Intake 60% average x 5 meals                     Problem/Interventions:             Nutrition Intervention     Row Name 02/07/22 0909          Nutrition Intervention    RD/Tech Action Recommend/ordered; Encourage intake; Follow Tx progress; Care plan reviewd     Recommended/Ordered Supplement                Nutrition Prescription     Row Name 02/07/22 0909          Nutrition Prescription PO    PO Prescription Begin/change supplement     Supplement Boost Glucose Control; Magic Cup     Supplement Frequency --  Boost Glucose Control TID, Magic Cup BID     Common Modifiers Cardiac; Consistent Carbohydrate     New PO Prescription Ordered? Yes                   Electronically signed by:  Marcia Steele RD  02/07/22 09:12 EST

## 2022-02-08 PROBLEM — E87.0 HYPERNATREMIA: Status: ACTIVE | Noted: 2022-02-08

## 2022-02-08 PROBLEM — K59.00 CONSTIPATION: Status: ACTIVE | Noted: 2022-02-08

## 2022-02-08 LAB
ALBUMIN SERPL-MCNC: 3 G/DL (ref 3.5–5.2)
ALBUMIN/GLOB SERPL: 0.9 G/DL
ALP SERPL-CCNC: 76 U/L (ref 39–117)
ALT SERPL W P-5'-P-CCNC: 17 U/L (ref 1–33)
ANION GAP SERPL CALCULATED.3IONS-SCNC: 12.9 MMOL/L (ref 5–15)
AST SERPL-CCNC: 26 U/L (ref 1–32)
BILIRUB SERPL-MCNC: 0.5 MG/DL (ref 0–1.2)
BUN SERPL-MCNC: 17 MG/DL (ref 8–23)
BUN/CREAT SERPL: 22.7 (ref 7–25)
CALCIUM SPEC-SCNC: 9.2 MG/DL (ref 8.6–10.5)
CHLORIDE SERPL-SCNC: 110 MMOL/L (ref 98–107)
CO2 SERPL-SCNC: 23.1 MMOL/L (ref 22–29)
CREAT SERPL-MCNC: 0.75 MG/DL (ref 0.57–1)
DEPRECATED RDW RBC AUTO: 44 FL (ref 37–54)
ERYTHROCYTE [DISTWIDTH] IN BLOOD BY AUTOMATED COUNT: 12.8 % (ref 12.3–15.4)
GFR SERPL CREATININE-BSD FRML MDRD: 73 ML/MIN/1.73
GLOBULIN UR ELPH-MCNC: 3.2 GM/DL
GLUCOSE BLDC GLUCOMTR-MCNC: 120 MG/DL (ref 70–130)
GLUCOSE BLDC GLUCOMTR-MCNC: 161 MG/DL (ref 70–130)
GLUCOSE BLDC GLUCOMTR-MCNC: 162 MG/DL (ref 70–130)
GLUCOSE BLDC GLUCOMTR-MCNC: 99 MG/DL (ref 70–130)
GLUCOSE SERPL-MCNC: 140 MG/DL (ref 65–99)
HCT VFR BLD AUTO: 37.6 % (ref 34–46.6)
HGB BLD-MCNC: 12 G/DL (ref 12–15.9)
MAGNESIUM SERPL-MCNC: 2.2 MG/DL (ref 1.6–2.4)
MCH RBC QN AUTO: 29.6 PG (ref 26.6–33)
MCHC RBC AUTO-ENTMCNC: 31.9 G/DL (ref 31.5–35.7)
MCV RBC AUTO: 92.6 FL (ref 79–97)
PLATELET # BLD AUTO: 225 10*3/MM3 (ref 140–450)
PMV BLD AUTO: 10.4 FL (ref 6–12)
POTASSIUM SERPL-SCNC: 4.2 MMOL/L (ref 3.5–5.2)
PROT SERPL-MCNC: 6.2 G/DL (ref 6–8.5)
RBC # BLD AUTO: 4.06 10*6/MM3 (ref 3.77–5.28)
SODIUM SERPL-SCNC: 146 MMOL/L (ref 136–145)
WBC NRBC COR # BLD: 9.62 10*3/MM3 (ref 3.4–10.8)

## 2022-02-08 PROCEDURE — 97162 PT EVAL MOD COMPLEX 30 MIN: CPT

## 2022-02-08 PROCEDURE — 82962 GLUCOSE BLOOD TEST: CPT

## 2022-02-08 PROCEDURE — 63710000001 INSULIN LISPRO (HUMAN) PER 5 UNITS: Performed by: INTERNAL MEDICINE

## 2022-02-08 PROCEDURE — 83735 ASSAY OF MAGNESIUM: CPT | Performed by: HOSPITALIST

## 2022-02-08 PROCEDURE — 80053 COMPREHEN METABOLIC PANEL: CPT | Performed by: HOSPITALIST

## 2022-02-08 PROCEDURE — 97110 THERAPEUTIC EXERCISES: CPT

## 2022-02-08 PROCEDURE — 85027 COMPLETE CBC AUTOMATED: CPT | Performed by: HOSPITALIST

## 2022-02-08 RX ORDER — HALOPERIDOL 2 MG/1
2 TABLET ORAL 2 TIMES DAILY
Status: DISCONTINUED | OUTPATIENT
Start: 2022-02-08 | End: 2022-02-09

## 2022-02-08 RX ORDER — POLYETHYLENE GLYCOL 3350 17 G/17G
17 POWDER, FOR SOLUTION ORAL DAILY
Status: DISCONTINUED | OUTPATIENT
Start: 2022-02-08 | End: 2022-02-17 | Stop reason: HOSPADM

## 2022-02-08 RX ORDER — AMOXICILLIN 250 MG
2 CAPSULE ORAL NIGHTLY
Status: DISCONTINUED | OUTPATIENT
Start: 2022-02-08 | End: 2022-02-17 | Stop reason: HOSPADM

## 2022-02-08 RX ADMIN — DOCUSATE SODIUM 50MG AND SENNOSIDES 8.6MG 2 TABLET: 8.6; 5 TABLET, FILM COATED ORAL at 20:34

## 2022-02-08 RX ADMIN — LISINOPRIL 7.5 MG: 5 TABLET ORAL at 08:01

## 2022-02-08 RX ADMIN — POLYETHYLENE GLYCOL 3350 17 G: 17 POWDER, FOR SOLUTION ORAL at 11:28

## 2022-02-08 RX ADMIN — INSULIN LISPRO 2 UNITS: 100 INJECTION, SOLUTION INTRAVENOUS; SUBCUTANEOUS at 11:28

## 2022-02-08 RX ADMIN — INSULIN LISPRO 2 UNITS: 100 INJECTION, SOLUTION INTRAVENOUS; SUBCUTANEOUS at 17:05

## 2022-02-08 RX ADMIN — METOPROLOL SUCCINATE 50 MG: 50 TABLET, EXTENDED RELEASE ORAL at 08:01

## 2022-02-08 RX ADMIN — HALOPERIDOL 2 MG: 2 TABLET ORAL at 08:01

## 2022-02-08 RX ADMIN — HALOPERIDOL 2 MG: 2 TABLET ORAL at 20:34

## 2022-02-08 NOTE — PROGRESS NOTES
Continued Stay Note  [facility]     Patient Name: Savannah Andres  MRN: [id number]  Today's Date: 2/8/2022    Admit Date: 2/3/2022     Discharge Plan     Row Name 02/08/22 1235       Plan    Plan Return to [facility] vs Skilled rehab    Plan Comments VM for dtr, Savannah Andres [phone].  Spoke with son, Collin Andres [phone].  Discussed dc planning.  Discussed using Medicare.gov for comparison.  Discussed that only a few facilities will accept Covid isolation patients.  They will review the information and give permission for referrals later.  No referrals at this time.  They prefer that patient return to [facility] if able........................Keerthi Godoy RN               Discharge Codes    No documentation.                     Keerthi Godoy RN

## 2022-02-08 NOTE — PROGRESS NOTES
Name: Savannah Andres ADMIT: 2/3/2022   : 1935  PCP: Vasu Del Angel MD    MRN: [id number] LOS: 5 days   AGE/SEX: 86 y.o. female  ROOM: [facility]     Subjective   Subjective   Per RN pt is doing better on Haldol. Slept through the night. Out of restraints today. Worked well with PT. No BM since admission. Still not eating/drinking much  Denies SOA or pain. Very sleepy today.     Review of Systems   Unable to perform ROS: Dementia        Objective   Objective   Vital Signs  Temp:  [97.8 °F (36.6 °C)] 97.8 °F (36.6 °C)  Heart Rate:  [73-87] 87  Resp:  [16-18] 18  BP: (112-140)/(72-77) 140/73  SpO2:  [93 %-95 %] 93 %  on   ;   Device (Oxygen Therapy): room air  Body mass index is 14.88 kg/m².  Physical Exam  Vitals and nursing note reviewed.   Constitutional:       General: She is not in acute distress.     Appearance: She is ill-appearing (chronically). She is not toxic-appearing or diaphoretic.   HENT:      Head: Normocephalic and atraumatic.      Right Ear: External ear normal.      Left Ear: External ear normal.      Mouth/Throat:      Mouth: Mucous membranes are moist.      Pharynx: Oropharynx is clear.   Eyes:      General: No scleral icterus.        Right eye: No discharge.         Left eye: No discharge.      Extraocular Movements: Extraocular movements intact.      Conjunctiva/sclera: Conjunctivae normal.   Cardiovascular:      Rate and Rhythm: Normal rate and regular rhythm.      Pulses: Normal pulses.      Heart sounds: Normal heart sounds.   Pulmonary:      Effort: Pulmonary effort is normal. No respiratory distress.      Breath sounds: Normal breath sounds. No wheezing or rales.   Abdominal:      General: Bowel sounds are normal. There is no distension.      Palpations: Abdomen is soft.      Tenderness: There is no abdominal tenderness.   Musculoskeletal:         General: No swelling. Normal range of motion.      Cervical back: Neck supple. No rigidity.   Lymphadenopathy:      Cervical: No cervical  adenopathy.   Skin:     General: Skin is warm and dry.      Capillary Refill: Capillary refill takes less than 2 seconds.      Coloration: Skin is not jaundiced.      Findings: No rash.   Neurological:      Mental Status: She is alert. Mental status is at baseline.      Cranial Nerves: No cranial nerve deficit.      Coordination: Coordination normal.      Comments: Moves all 4 equally   Psychiatric:         Attention and Perception: Attention normal.         Mood and Affect: Mood normal.         Speech: Speech is tangential.         Behavior: Behavior is cooperative.         Cognition and Memory: Memory is impaired.         Results Review     I reviewed the patient's new clinical results.  Results from last 7 days   Lab Units 02/08/22  0353 02/07/22  1020 02/06/22  0629 02/04/22  0327   WBC 10*3/mm3 9.62 7.35 6.41 7.41   HEMOGLOBIN g/dL 12.0 12.9 12.2 13.0   PLATELETS 10*3/mm3 225 254 226 233     Results from last 7 days   Lab Units 02/08/22  0353 02/07/22  1020 02/06/22  0629 02/04/22  0327   SODIUM mmol/L 146* 144 142 142   POTASSIUM mmol/L 4.2 3.9 4.0 4.4   CHLORIDE mmol/L 110* 107 106 107   CO2 mmol/L 23.1 25.0 26.9 22.0   BUN mg/dL 17 16 24* 6*   CREATININE mg/dL 0.75 0.76 1.01* 0.78   GLUCOSE mg/dL 140* 97 159* 150*   EGFR IF NONAFRICN AM mL/min/1.73 73 72 52* 70     Results from last 7 days   Lab Units 02/08/22 0353 02/06/22  0629 02/03/22  1232   ALBUMIN g/dL 3.00* 3.10* 3.60   BILIRUBIN mg/dL 0.5  --  0.5   ALK PHOS U/L 76  --  90   AST (SGOT) U/L 26  --  21   ALT (SGPT) U/L 17  --  20     Results from last 7 days   Lab Units 02/08/22  0353 02/07/22  1020 02/06/22  0629 02/04/22  0327 02/03/22  1232 02/03/22  1232   CALCIUM mg/dL 9.2 9.5 9.2 9.1   < > 9.7   ALBUMIN g/dL 3.00*  --  3.10*  --   --  3.60   MAGNESIUM mg/dL 2.2  --  2.0  --   --  1.8   PHOSPHORUS mg/dL  --   --  3.9  --   --   --     < > = values in this interval not displayed.       COVID19   Date Value Ref Range Status   01/19/2022 Detected  (C) Not Detected - Ref. Range Final     Hemoglobin A1C   Date/Time Value Ref Range Status   02/06/2022 0629 7.79 (H) 4.80 - 5.60 % Final     Glucose   Date/Time Value Ref Range Status   02/08/2022 1121 162 (H) 70 - 130 mg/dL Final     Comment:     Meter: [id number] : [id number] Carlos A Nguyen CNA   02/08/2022 0631 120 70 - 130 mg/dL Final     Comment:     Meter: [id number] : [id number] Silva Jayashree PCA   02/07/2022 2324 103 70 - 130 mg/dL Final     Comment:     Meter: [id number] : [id number] Silva Jayashree PCA   02/07/2022 2029 76 70 - 130 mg/dL Final     Comment:     Meter: [id number] : [id number] Silva Jayashree PCA   02/07/2022 1602 189 (H) 70 - 130 mg/dL Final     Comment:     Meter: [id number] : [id number] Vielka Thompson CNA   02/07/2022 1056 99 70 - 130 mg/dL Final     Comment:     Meter: [id number] : [id number] Vielka Thompson CNA   02/07/2022 0627 175 (H) 70 - 130 mg/dL Final     Comment:     Meter: [id number] : [id number] Silva Jayashree PCA       CT Head Without Contrast  CT HEAD WITHOUT CONTRAST     HISTORY: Altered mental status.     COMPARISON: None.     FINDINGS: The brain ventricles are symmetrical. Confluent areas of  decreased attenuation are noted involving the white matter of the  cerebral hemispheres bilaterally. Appearance is consistent with  extensive small vessel ischemic disease. There is no evidence of acute  infarction or of intracranial hemorrhage. Further evaluation could be  performed with an MRI examination of the brain as indicated.        Radiation dose reduction techniques were utilized, including automated  exposure control and exposure modulation based on body size.     This report was finalized on 2/4/2022 8:03 AM by Dr. Luis Carlos Steward M.D.       Scheduled Medications  haloperidol, 2 mg, Oral, TID  insulin lispro, 0-7 Units, Subcutaneous, TID AC  lisinopril, 7.5 mg, Oral, Daily  metoprolol succinate XL, 50 mg, Oral, Daily  polyethylene glycol, 17 g, Oral,  Daily  senna-docusate sodium, 2 tablet, Oral, Nightly    Infusions   Diet  Diet Regular; Cardiac, Consistent Carbohydrate       Assessment/Plan     Active Hospital Problems    Diagnosis  POA   • **Dementia with behavioral disturbance (HCC) [F03.91]  Yes   • Constipation [K59.00]  Yes   • Hypernatremia [E87.0]  No   • Type 2 diabetes mellitus without complication, without long-term current use of insulin (HCC) [E11.9]  Yes   • Severe malnutrition (CMS/HCC) [E43]  Yes   • Cardiomyopathy (HCC) [I42.9]  Yes   • Pulmonary hypertension (HCC) [I27.20]  Yes   • Hypertension [I10]  Yes   • Hypokalemia [E87.6]  Yes   • CHF (congestive heart failure), NYHA class III (HCC) [I50.9]  Yes      Resolved Hospital Problems   No resolved problems to display.       86 y.o. female admitted with Dementia with behavioral disturbance (HCC).    · Dementia with behavioral disturbance: Likely vascular dementia based on CT findings. TSH and B12 okay. Treated initially with Geodon, but stopped by Psychiatry due to potential QT issues. Changed to Zyprexa. Still didn't show any improvement. Psychiatry has now changed Zyprexa to Haldol and seems to be working better for her today. ?Terminal agitation. Out of restraints this AM.   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 Continue beta-blocker, ACE inhibitor. Uses as-needed diuretic as outpatient.  · Hypokalemia: Replaced. Monitor.  · Hypernatremia: will encourage po water today.  · Hypertension: BPs acceptable on Lisinopril and Metoprolol.  · DM2 (new diagnosis): A1c 7.7 and she had hyperglycemia on admission. Continue SSI low dose and will continue to monitor requirements.  · Severe malnutrition/FTT: Nutrition following.  · Constipation: start bowel regimen.      · SCDs for DVT prophylaxis.  · No CPR.   · Discussed with patient, nursing staff, CCP and care team on multidisciplinary rounds. D/w Dr. Grande  (Psych).  · Anticipate discharge to SNU facility, timing yet to be determined.      Collin Reyes MD  [facility]  02/08/22  11:44 EST

## 2022-02-08 NOTE — PLAN OF CARE
Problem: Adult Inpatient Plan of Care  Goal: Plan of Care Review  Outcome: Ongoing, Progressing  Flowsheets (Taken 2/8/2022 1620)  Progress: improving  Plan of Care Reviewed With: patient  Outcome Summary: VSS, room air, alert to self, pleasant and cooperative, drowsy most of the day, wakes for ADL's, self turns, assist with meals, encourage po intake.  Goal: Patient-Specific Goal (Individualized)  Outcome: Ongoing, Progressing  Goal: Absence of Hospital-Acquired Illness or Injury  Outcome: Ongoing, Progressing  Intervention: Identify and Manage Fall Risk  Recent Flowsheet Documentation  Taken 2/8/2022 1611 by Christina Patino RN  Safety Promotion/Fall Prevention:  • activity supervised  • assistive device/personal items within reach  • clutter free environment maintained  • fall prevention program maintained  • nonskid shoes/slippers when out of bed  • safety round/check completed  • room organization consistent  Taken 2/8/2022 1410 by Christina Patino RN  Safety Promotion/Fall Prevention:  • clutter free environment maintained  • assistive device/personal items within reach  • activity supervised  • fall prevention program maintained  • nonskid shoes/slippers when out of bed  • room organization consistent  • safety round/check completed  Taken 2/8/2022 1154 by Christina Patino RN  Safety Promotion/Fall Prevention:  • activity supervised  • assistive device/personal items within reach  • clutter free environment maintained  • fall prevention program maintained  • nonskid shoes/slippers when out of bed  • room organization consistent  • safety round/check completed  Taken 2/8/2022 1005 by Christina Patino RN  Safety Promotion/Fall Prevention:  • activity supervised  • assistive device/personal items within reach  • clutter free environment maintained  • fall prevention program maintained  • nonskid shoes/slippers when out of bed  • room organization consistent  • safety round/check completed  Taken  2/8/2022 0747 by Christina Patino RN  Safety Promotion/Fall Prevention:  • activity supervised  • assistive device/personal items within reach  • clutter free environment maintained  • fall prevention program maintained  • nonskid shoes/slippers when out of bed  • room organization consistent  • safety round/check completed  Intervention: Prevent Skin Injury  Recent Flowsheet Documentation  Taken 2/8/2022 1611 by Christina Patino RN  Body Position: position maintained  Taken 2/8/2022 1410 by Christina Patino RN  Body Position:  • side-lying, right  • position changed independently  Taken 2/8/2022 1154 by Christina Patino RN  Body Position:  • position changed independently  • side-lying, left  Taken 2/8/2022 1005 by Christina Patino RN  Body Position: supine  Taken 2/8/2022 0900 by Christina Patino RN  Body Position:  • position changed independently  • side-lying, left  Taken 2/8/2022 0747 by Christina Patino RN  Body Position: (from right side)  • supine  • turned  Goal: Optimal Comfort and Wellbeing  Outcome: Ongoing, Progressing  Goal: Readiness for Transition of Care  Outcome: Ongoing, Progressing     Problem: Fall Injury Risk  Goal: Absence of Fall and Fall-Related Injury  Outcome: Ongoing, Progressing  Intervention: Promote Injury-Free Environment  Recent Flowsheet Documentation  Taken 2/8/2022 1611 by Christina Patino RN  Safety Promotion/Fall Prevention:  • activity supervised  • assistive device/personal items within reach  • clutter free environment maintained  • fall prevention program maintained  • nonskid shoes/slippers when out of bed  • safety round/check completed  • room organization consistent  Taken 2/8/2022 1410 by Christina Patino RN  Safety Promotion/Fall Prevention:  • clutter free environment maintained  • assistive device/personal items within reach  • activity supervised  • fall prevention program maintained  • nonskid shoes/slippers when out of bed  • room  organization consistent  • safety round/check completed  Taken 2/8/2022 1154 by Christina Patino RN  Safety Promotion/Fall Prevention:  • activity supervised  • assistive device/personal items within reach  • clutter free environment maintained  • fall prevention program maintained  • nonskid shoes/slippers when out of bed  • room organization consistent  • safety round/check completed  Taken 2/8/2022 1005 by Christina Patino RN  Safety Promotion/Fall Prevention:  • activity supervised  • assistive device/personal items within reach  • clutter free environment maintained  • fall prevention program maintained  • nonskid shoes/slippers when out of bed  • room organization consistent  • safety round/check completed  Taken 2/8/2022 0747 by Christina Patino RN  Safety Promotion/Fall Prevention:  • activity supervised  • assistive device/personal items within reach  • clutter free environment maintained  • fall prevention program maintained  • nonskid shoes/slippers when out of bed  • room organization consistent  • safety round/check completed     Problem: Skin Injury Risk Increased  Goal: Skin Health and Integrity  Outcome: Ongoing, Progressing  Intervention: Optimize Skin Protection  Recent Flowsheet Documentation  Taken 2/8/2022 1611 by Christina Patino RN  Head of Bed (HOB): HOB at 15 degrees  Taken 2/8/2022 1410 by Christina Patino RN  Head of Bed (HOB): HOB at 15 degrees  Taken 2/8/2022 1154 by Christina Patino RN  Head of Bed (HOB): HOB at 30-45 degrees  Taken 2/8/2022 1005 by Christina Patino RN  Head of Bed (HOB): HOB at 30-45 degrees  Taken 2/8/2022 0900 by Christina Patino RN  Head of Bed (HOB): HOB at 15 degrees  Taken 2/8/2022 0747 by Christina Patino RN  Pressure Reduction Techniques:  • frequent weight shift encouraged  • weight shift assistance provided  • heels elevated off bed  Head of Bed (HOB): HOB at 30-45 degrees  Pressure Reduction Devices: alternating pressure pump  (ADD)     Problem: Restraint, Nonbehavioral (Nonviolent)  Goal: Discontinuation Criteria Achieved  Outcome: Ongoing, Progressing  Goal: Personal Dignity and Safety Maintained  Outcome: Ongoing, Progressing  Intervention: Protect Skin and Joint Integrity  Recent Flowsheet Documentation  Taken 2/8/2022 1611 by Christina Patino RN  Body Position: position maintained  Taken 2/8/2022 1410 by Christina Patino RN  Body Position:  • side-lying, right  • position changed independently  Taken 2/8/2022 1154 by Christina Patino RN  Body Position:  • position changed independently  • side-lying, left  Taken 2/8/2022 1005 by Christina Patino RN  Body Position: supine  Taken 2/8/2022 0900 by Christina Patino RN  Body Position:  • position changed independently  • side-lying, left  Taken 2/8/2022 0747 by Christina Patino RN  Body Position: (from right side)  • supine  • turned     Problem: Malnutrition  Goal: Improved Nutritional Intake  Outcome: Ongoing, Progressing

## 2022-02-08 NOTE — PLAN OF CARE
Goal Outcome Evaluation:  Plan of Care Reviewed With: patient        Progress: no change  Outcome Summary: pt sleeping well throughout shift. q2 turns. vss, remains on ra. agitated at start of shift but settled down. too drowsy to swallow scheduled haldol, not given. tolerating being out of restraints. wctm.

## 2022-02-08 NOTE — PROGRESS NOTES
The patient is resting comfortably today and staff reports good response to initiation of Haldol though there may be some degree of oversedation.  I will reduce her dose of this medication to 2 mg twice daily and continue to follow with you.  She is now out of restraints and staff reports that she has been cooperative with care.

## 2022-02-09 LAB
ALBUMIN SERPL-MCNC: 2.9 G/DL (ref 3.5–5.2)
ALBUMIN/GLOB SERPL: 0.9 G/DL
ALP SERPL-CCNC: 74 U/L (ref 39–117)
ALT SERPL W P-5'-P-CCNC: 17 U/L (ref 1–33)
ANION GAP SERPL CALCULATED.3IONS-SCNC: 8.9 MMOL/L (ref 5–15)
AST SERPL-CCNC: 20 U/L (ref 1–32)
BILIRUB SERPL-MCNC: 0.4 MG/DL (ref 0–1.2)
BUN SERPL-MCNC: 23 MG/DL (ref 8–23)
BUN/CREAT SERPL: 27.1 (ref 7–25)
CALCIUM SPEC-SCNC: 9.1 MG/DL (ref 8.6–10.5)
CHLORIDE SERPL-SCNC: 105 MMOL/L (ref 98–107)
CO2 SERPL-SCNC: 28.1 MMOL/L (ref 22–29)
CREAT SERPL-MCNC: 0.85 MG/DL (ref 0.57–1)
DEPRECATED RDW RBC AUTO: 43.3 FL (ref 37–54)
ERYTHROCYTE [DISTWIDTH] IN BLOOD BY AUTOMATED COUNT: 12.6 % (ref 12.3–15.4)
GFR SERPL CREATININE-BSD FRML MDRD: 63 ML/MIN/1.73
GLOBULIN UR ELPH-MCNC: 3.2 GM/DL
GLUCOSE BLDC GLUCOMTR-MCNC: 129 MG/DL (ref 70–130)
GLUCOSE BLDC GLUCOMTR-MCNC: 144 MG/DL (ref 70–130)
GLUCOSE BLDC GLUCOMTR-MCNC: 162 MG/DL (ref 70–130)
GLUCOSE BLDC GLUCOMTR-MCNC: 168 MG/DL (ref 70–130)
GLUCOSE SERPL-MCNC: 132 MG/DL (ref 65–99)
HCT VFR BLD AUTO: 37.3 % (ref 34–46.6)
HGB BLD-MCNC: 11.9 G/DL (ref 12–15.9)
MAGNESIUM SERPL-MCNC: 2.3 MG/DL (ref 1.6–2.4)
MCH RBC QN AUTO: 29.4 PG (ref 26.6–33)
MCHC RBC AUTO-ENTMCNC: 31.9 G/DL (ref 31.5–35.7)
MCV RBC AUTO: 92.1 FL (ref 79–97)
PLATELET # BLD AUTO: 217 10*3/MM3 (ref 140–450)
PMV BLD AUTO: 10.5 FL (ref 6–12)
POTASSIUM SERPL-SCNC: 3.9 MMOL/L (ref 3.5–5.2)
PROT SERPL-MCNC: 6.1 G/DL (ref 6–8.5)
RBC # BLD AUTO: 4.05 10*6/MM3 (ref 3.77–5.28)
SODIUM SERPL-SCNC: 142 MMOL/L (ref 136–145)
WBC NRBC COR # BLD: 5.57 10*3/MM3 (ref 3.4–10.8)

## 2022-02-09 PROCEDURE — 25010000002 HALOPERIDOL LACTATE PER 5 MG: Performed by: SPECIALIST

## 2022-02-09 PROCEDURE — 99222 1ST HOSP IP/OBS MODERATE 55: CPT | Performed by: NURSE PRACTITIONER

## 2022-02-09 PROCEDURE — 83735 ASSAY OF MAGNESIUM: CPT | Performed by: HOSPITALIST

## 2022-02-09 PROCEDURE — 80053 COMPREHEN METABOLIC PANEL: CPT | Performed by: HOSPITALIST

## 2022-02-09 PROCEDURE — 85027 COMPLETE CBC AUTOMATED: CPT | Performed by: HOSPITALIST

## 2022-02-09 PROCEDURE — 63710000001 INSULIN LISPRO (HUMAN) PER 5 UNITS: Performed by: INTERNAL MEDICINE

## 2022-02-09 PROCEDURE — 82962 GLUCOSE BLOOD TEST: CPT

## 2022-02-09 PROCEDURE — 97530 THERAPEUTIC ACTIVITIES: CPT | Performed by: PHYSICAL THERAPIST

## 2022-02-09 RX ORDER — HALOPERIDOL 2 MG/1
4 TABLET ORAL 2 TIMES DAILY
Status: DISCONTINUED | OUTPATIENT
Start: 2022-02-09 | End: 2022-02-10

## 2022-02-09 RX ADMIN — LISINOPRIL 7.5 MG: 5 TABLET ORAL at 08:20

## 2022-02-09 RX ADMIN — HALOPERIDOL 2 MG: 2 TABLET ORAL at 08:21

## 2022-02-09 RX ADMIN — INSULIN LISPRO 2 UNITS: 100 INJECTION, SOLUTION INTRAVENOUS; SUBCUTANEOUS at 17:00

## 2022-02-09 RX ADMIN — HALOPERIDOL LACTATE 5 MG: 5 INJECTION, SOLUTION INTRAMUSCULAR at 11:26

## 2022-02-09 RX ADMIN — METOPROLOL SUCCINATE 50 MG: 50 TABLET, EXTENDED RELEASE ORAL at 08:21

## 2022-02-09 RX ADMIN — DOCUSATE SODIUM 50MG AND SENNOSIDES 8.6MG 2 TABLET: 8.6; 5 TABLET, FILM COATED ORAL at 21:17

## 2022-02-09 RX ADMIN — HALOPERIDOL 4 MG: 2 TABLET ORAL at 21:17

## 2022-02-09 RX ADMIN — POLYETHYLENE GLYCOL 3350 17 G: 17 POWDER, FOR SOLUTION ORAL at 08:21

## 2022-02-09 NOTE — PLAN OF CARE
Goal Outcome Evaluation:  Plan of Care Reviewed With: patient        Progress: improving  Outcome Summary: Pt pleasantly confused overnight. Disoriented x4, frequently asks nurse what her name is and where she's at. VSS. Encouraging po intake, drank several cups of water and ate. Q2 turns, incontinence care prn. Up to bedside commode assist x1. Will continue to monitor closely.

## 2022-02-09 NOTE — PROGRESS NOTES
Continued Stay Note  [facility]     Patient Name: Savannah Andres  MRN: [id number]  Today's Date: 2/9/2022    Admit Date: 2/3/2022     Discharge Plan     Row Name 02/09/22 1348       Plan    Plan Skilled rehab referrals are pending for skilled rehab vs [facility] with HH for PT/OT    Plan Comments [facility] declined in [facility], [facility] does not have a bed until next week per Mona.   for Pamela to evaluate referrals....................Keerthi Godoy RN               Discharge Codes    No documentation.                     Keerthi Godoy RN

## 2022-02-09 NOTE — THERAPY TREATMENT NOTE
Patient Name: Savannah Andres  : 1935    MRN: [id number]                              Today's Date: 2022       Admit Date: 2/3/2022    Visit Dx:     ICD-10-CM ICD-9-CM   1. Dementia with behavioral disturbance, unspecified dementia type (HCC)  F03.91 294.21   2. Diarrhea, unspecified type  R19.7 787.91   3. Hypokalemia  E87.6 276.8     Patient Active Problem List   Diagnosis   • Third degree heart block (HCC)   • CHF (congestive heart failure), NYHA class III (HCC)   • Hyperglycemia   • Hypokalemia   • Acute midline low back pain without sciatica   • Inflamed sebaceous cyst   • Hypertension   • Presence of permanent cardiac pacemaker   • Valvular heart disease   • Pulmonary hypertension (HCC)   • Hordeolum externum of right lower eyelid   • Cardiomyopathy (HCC)   • Dementia with behavioral disturbance (HCC)   • Severe malnutrition (CMS/HCC)   • Type 2 diabetes mellitus without complication, without long-term current use of insulin (HCC)   • Constipation   • Hypernatremia     Past Medical History:   Diagnosis Date   • Bilateral leg edema    • 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   • CHF (congestive heart failure) (HCC)    • Hypertension    • Loss of consciousness (HCC)    • Skin cyst    • SOB (shortness of breath)    • Symptomatic bradycardia    • Syncope      Past Surgical History:   Procedure Laterality Date   • CARDIAC ELECTROPHYSIOLOGY PROCEDURE N/A 2016    Procedure: Pacemaker DC new  [employer];  Surgeon: Ananth Malcolm MD;  Location: [facility] INVASIVE LOCATION;  Service:    • PACEMAKER IMPLANTATION        General Information     Row Name 22 1632          Physical Therapy Time and Intention    Document Type therapy note (daily note)  -ZO     Mode of Treatment physical therapy  -ZO           User Key  (r) = Recorded By, (t) = Taken By, (c) = Cosigned By    Initials Name Provider Type    Miranda Good PT Physical Therapist               Mobility     Row Name  02/09/22 1633          Bed Mobility    Supine-Sit Duck (Bed Mobility) contact guard  -     Sit-Supine Duck (Bed Mobility) standby assist  -     Assistive Device (Bed Mobility) head of bed elevated; bed rails  -KH     Row Name 02/09/22 1633          Sit-Stand Transfer    Sit-Stand Duck (Transfers) contact guard  -KH     Row Name 02/09/22 1633          Gait/Stairs (Locomotion)    Duck Level (Gait) contact guard; minimum assist (75% patient effort)  -     Assistive Device (Gait) --  HHA  -     Distance in Feet (Gait) 150 ft  -     Deviations/Abnormal Patterns (Gait) festinating/shuffling; gait speed decreased  -     Bilateral Gait Deviations heel strike decreased  -           User Key  (r) = Recorded By, (t) = Taken By, (c) = Cosigned By    Initials Name Provider Type    Miranda Good PT Physical Therapist               Obj/Interventions     Row Name 02/09/22 1634          Motor Skills    Therapeutic Exercise --  BLE LAQ, seated marching x 10 reps  -           User Key  (r) = Recorded By, (t) = Taken By, (c) = Cosigned By    Initials Name Provider Type    Miranda Good PT Physical Therapist               Goals/Plan    No documentation.                Clinical Impression     Row Name 02/09/22 1635          Pain    Additional Documentation Pain Scale: Numbers Pre/Post-Treatment (Group)  -KH     Row Name 02/09/22 1635          Pain Scale: Numbers Pre/Post-Treatment    Pretreatment Pain Rating 0/10 - no pain  -     Posttreatment Pain Rating 0/10 - no pain  -KH     Row Name 02/09/22 1636 02/09/22 1635       Plan of Care Review    Plan of Care Reviewed With -- patient  -    Outcome Summary Pt was trying to get out of bed when PT arrived. Pt was confused and impulsive, but agreeable walking in the hallway. Pt remains slightly unsteady and took several short standing rest breaks. Will continue to progress as tolerated.  - --    Row Name 02/09/22  1636          Positioning and Restraints    Pre-Treatment Position in bed  -KH     Post Treatment Position bed  -KH     In Bed fowlers; call light within reach; encouraged to call for assist; exit alarm on  -           User Key  (r) = Recorded By, (t) = Taken By, (c) = Cosigned By    Initials Name Provider Type    Miranda Good, PT Physical Therapist               Outcome Measures     Row Name 02/09/22 1638          How much help from another person do you currently need...    Turning from your back to your side while in flat bed without using bedrails? 4  -KH     Moving from lying on back to sitting on the side of a flat bed without bedrails? 3  -KH     Moving to and from a bed to a chair (including a wheelchair)? 3  -KH     Standing up from a chair using your arms (e.g., wheelchair, bedside chair)? 3  -KH     Climbing 3-5 steps with a railing? 2  -KH     To walk in hospital room? 3  -KH     AM-PAC 6 Clicks Score (PT) 18  -           User Key  (r) = Recorded By, (t) = Taken By, (c) = Cosigned By    Initials Name Provider Type    Miranda Good, PT Physical Therapist                             Physical Therapy Education                 Title: PT OT SLP Therapies (Done)     Topic: Physical Therapy (Done)     Point: Mobility training (Done)     Learning Progress Summary           Patient Acceptance, E, VU,NR by ZO at 2/9/2022 1638    Acceptance, E, NR by AR at 2/8/2022 1111                   Point: Home exercise program (Done)     Learning Progress Summary           Patient Acceptance, E, VU,NR by ZO at 2/9/2022 1638    Acceptance, E, NR by AR at 2/8/2022 1111                   Point: Body mechanics (Done)     Learning Progress Summary           Patient Acceptance, E, VU,NR by ZO at 2/9/2022 1638    Acceptance, E, NR by AR at 2/8/2022 1111                   Point: Precautions (Done)     Learning Progress Summary           Patient Acceptance, E, VU,NR by ZO at 2/9/2022 1638    Acceptance,  E, NR by AR at 2/8/2022 1111                               User Key     Initials Effective Dates Name Provider Type Discipline     06/16/21 -  Miranda Robles, PT Physical Therapist PT    AR 06/16/21 -  Samantha Tovar PT Physical Therapist PT              PT Recommendation and Plan     Plan of Care Reviewed With: patient  Outcome Summary: Pt was trying to get out of bed when PT arrived. Pt was confused and impulsive, but agreeable walking in the hallway. Pt remains slightly unsteady and took several short standing rest breaks. Will continue to progress as tolerated.     Time Calculation:    PT Charges     Row Name 02/09/22 1057             Time Calculation    Start Time 0925  -      Stop Time 0937  -      Time Calculation (min) 12 min  -      PT Received On 02/09/22  -      PT - Next Appointment 02/10/22  -              Time Calculation- PT    Total Timed Code Minutes- PT 12 minute(s)  -              Timed Charges    07198 - PT Therapeutic Activity Minutes 12  -KH              Total Minutes    Timed Charges Total Minutes 12  -KH       Total Minutes 12  -KH            User Key  (r) = Recorded By, (t) = Taken By, (c) = Cosigned By    Initials Name Provider Type    Miranda Good, PT Physical Therapist              Therapy Charges for Today     Code Description Service Date Service Provider Modifiers Qty    76659002523 HC PT THERAPEUTIC ACT EA 15 MIN 2/9/2022 Miranda Robles, PT GP 1          PT G-Codes  Outcome Measure Options: AM-PAC 6 Clicks Basic Mobility (PT)  AM-PAC 6 Clicks Score (PT): 18    Miranda Robles PT  2/9/2022

## 2022-02-09 NOTE — THERAPY TREATMENT NOTE
Patient Name: Savannah Andres  : 1935    MRN: [id number]                              Today's Date: 2022       Admit Date: 2/3/2022    Visit Dx:     ICD-10-CM ICD-9-CM   1. Dementia with behavioral disturbance, unspecified dementia type (HCC)  F03.91 294.21   2. Diarrhea, unspecified type  R19.7 787.91   3. Hypokalemia  E87.6 276.8     Patient Active Problem List   Diagnosis   • Third degree heart block (HCC)   • CHF (congestive heart failure), NYHA class III (HCC)   • Hyperglycemia   • Hypokalemia   • Acute midline low back pain without sciatica   • Inflamed sebaceous cyst   • Hypertension   • Presence of permanent cardiac pacemaker   • Valvular heart disease   • Pulmonary hypertension (HCC)   • Hordeolum externum of right lower eyelid   • Cardiomyopathy (HCC)   • Dementia with behavioral disturbance (HCC)   • Severe malnutrition (CMS/HCC)   • Type 2 diabetes mellitus without complication, without long-term current use of insulin (HCC)   • Constipation   • Hypernatremia     Past Medical History:   Diagnosis Date   • Bilateral leg edema    • 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   • CHF (congestive heart failure) (HCC)    • Hypertension    • Loss of consciousness (HCC)    • Skin cyst    • SOB (shortness of breath)    • Symptomatic bradycardia    • Syncope      Past Surgical History:   Procedure Laterality Date   • CARDIAC ELECTROPHYSIOLOGY PROCEDURE N/A 2016    Procedure: Pacemaker DC new  [employer];  Surgeon: Ananth Malcolm MD;  Location: [facility] INVASIVE LOCATION;  Service:    • PACEMAKER IMPLANTATION        General Information    No documentation.                Mobility    No documentation.                Obj/Interventions    No documentation.                Goals/Plan    No documentation.                Clinical Impression    No documentation.                Outcome Measures    No documentation.                              Physical Therapy Education                 Title: PT OT  SLP Therapies (In Progress)     Topic: Physical Therapy (In Progress)     Point: Mobility training (In Progress)     Learning Progress Summary           Patient Acceptance, E, NR by AR at 2/8/2022 1111                   Point: Home exercise program (In Progress)     Learning Progress Summary           Patient Acceptance, E, NR by AR at 2/8/2022 1111                   Point: Body mechanics (In Progress)     Learning Progress Summary           Patient Acceptance, E, NR by AR at 2/8/2022 1111                   Point: Precautions (In Progress)     Learning Progress Summary           Patient Acceptance, E, NR by AR at 2/8/2022 1111                               User Key     Initials Effective Dates Name Provider Type Discipline    AR 06/16/21 -  Samantha Tovar, PT Physical Therapist PT              PT Recommendation and Plan           Time Calculation:    PT Charges     Row Name 02/09/22 1057             Time Calculation    Start Time 0925  -      Stop Time 0937  -      Time Calculation (min) 12 min  -KH      PT Received On 02/09/22  -      PT - Next Appointment 02/10/22  -              Time Calculation- PT    Total Timed Code Minutes- PT 12 minute(s)  -              Timed Charges    95774 - PT Therapeutic Activity Minutes 12  -KH              Total Minutes    Timed Charges Total Minutes 12  -KH       Total Minutes 12  -KH            User Key  (r) = Recorded By, (t) = Taken By, (c) = Cosigned By    Initials Name Provider Type     Miranda Robles, PT Physical Therapist              Therapy Charges for Today     Code Description Service Date Service Provider Modifiers Qty    79687668268  PT THERAPEUTIC ACT EA 15 MIN 2/9/2022 Miranda Robles, PT GP 1          PT G-Codes  Outcome Measure Options: AM-PAC 6 Clicks Basic Mobility (PT)  AM-PAC 6 Clicks Score (PT): 18    Miranda Robles PT  2/9/2022

## 2022-02-09 NOTE — PLAN OF CARE
Goal Outcome Evaluation:  Plan of Care Reviewed With: patient           Outcome Summary: Pt was trying to get out of bed when PT arrived. Pt was confused and impulsive, but agreeable walking in the hallway. Pt remains slightly unsteady and took several short standing rest breaks. Will continue to progress as tolerated.

## 2022-02-09 NOTE — CONSULTS
Palliative Care Initial Consult   Attending Physician: Collin Reyes MD  Referring Provider:Collin Reyes MD     Reason for Referral: assistance with clarification of goals of care  Family/Support: children     Code Status and Medical Interventions:   Ordered at: 22 1249     Medical Intervention Limits:    NO intubation (DNI)    NO cardioversion    NO dialysis     Level Of Support Discussed With:    Next of Kin (If No Surrogate)     Code Status (Patient has no pulse and is not breathing):    No CPR (Do Not Attempt to Resuscitate)     Medical Interventions (Patient has pulse or is breathing):    Limited Support     Goals of Care: TBD.    HPI:   86 y.o. female with history of dementia, recent COVID 19 infection, complete heart block third-degree, congestive heart failure, and hypertension. She currently resides at [facility] facility.   Patient presented to [facility] on 2/3/2022 related to worsening confusion and diarrhea. In ER, CT head without acute findings. CXR with no evidence of disease. Labs with  electrolyte abnormalities (potassium 2.7)  And elevated glucose 296. Hemoglobin A1c 7.79. Urine was normal. She further lab work and b12, tsh normal. She has been evaluted by psychiatrist and treated with geodon and zyprexa with little benefit. She was placed in wrist restraints due to behavior and was out for 2 days, however earlier this afternoon started with agitation and attempted to hit RNs when being redirected. Current treatment haldol, which is being adjusted accordingly. We were asked to provide support to family.  Palliative Care Spoke With: family (attempted)     Review of Systems   Unable to perform ROS: dementia       1- Pain Assessment  PAINAD Breathin-->normal  PAINAD Negative Vocalization: 0-->none  PAINAD Facial Expression: 0-->smiling or inexpressive  PAINAD Body Language: 0-->relaxed  PAINAD Consolability: 0-->no need to console  PAINAD Score: 0    Past Medical  History:   Diagnosis Date   • Bilateral leg edema    • 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   • CHF (congestive heart failure) (HCC)    • Hypertension    • Loss of consciousness (HCC)    • Skin cyst    • SOB (shortness of breath)    • Symptomatic bradycardia    • Syncope      Past Surgical History:   Procedure Laterality Date   • CARDIAC ELECTROPHYSIOLOGY PROCEDURE N/A 11/23/2016    Procedure: Pacemaker DC new  [employer]TRON[employer];  Surgeon: Ananth Malcolm MD;  Location: [facility] INVASIVE LOCATION;  Service:    • PACEMAKER IMPLANTATION       Social History     Socioeconomic History   • Marital status: Single   • Number of children: 3   Tobacco Use   • Smoking status: Never Smoker   • Smokeless tobacco: Never Used   • Tobacco comment: caffeine use   Substance and Sexual Activity   • Alcohol use: No   • Drug use: No   • Sexual activity: Defer       Current Facility-Administered Medications   Medication Dose Route Frequency Provider Last Rate Last Admin   • acetaminophen (TYLENOL) tablet 650 mg  650 mg Oral Q4H PRN Nury Rodriguez MD       • dextrose (D50W) (25 g/50 mL) IV injection 25 g  25 g Intravenous Q15 Min PRN Jose Alejandro Sandra MD       • dextrose (GLUTOSE) oral gel 15 g  15 g Oral Q15 Min PRN Jose Alejandro Sandra MD       • glucagon (human recombinant) (GLUCAGEN DIAGNOSTIC) injection 1 mg  1 mg Subcutaneous Q15 Min PRN Jose Alejandro Sandra MD       • haloperidol (HALDOL) tablet 4 mg  4 mg Oral BID Brian Grande III, MD       • haloperidol lactate (HALDOL) injection 5 mg  5 mg Intramuscular Q6H PRN Brian Grande III, MD   5 mg at 02/09/22 1126   • insulin lispro (ADMELOG) injection 0-7 Units  0-7 Units Subcutaneous TID AC Jose Alejandro Sandra MD   2 Units at 02/08/22 1705   • lisinopril (PRINIVIL,ZESTRIL) tablet 7.5 mg  7.5 mg Oral Daily Nury Rodriguez MD   7.5 mg at 02/09/22 0820   • melatonin tablet 3 mg  3 mg Oral Nightly PRN Nury Rodriguez MD       •  "metoprolol succinate XL (TOPROL-XL) 24 hr tablet 50 mg  50 mg Oral Daily Nury Rodriguez MD   50 mg at 02/09/22 0821   • nitroglycerin (NITROSTAT) SL tablet 0.4 mg  0.4 mg Sublingual Q5 Min PRN Nury Rodriguez MD       • ondansetron (ZOFRAN) tablet 4 mg  4 mg Oral Q6H PRN Nury Rodriguez MD        Or   • ondansetron (ZOFRAN) injection 4 mg  4 mg Intravenous Q6H PRN Nury Rodriguez MD       • polyethylene glycol (MIRALAX) packet 17 g  17 g Oral Daily Collin Reyes MD   17 g at 02/09/22 0821   • potassium chloride (K-DUR,KLOR-CON) ER tablet 40 mEq  40 mEq Oral PRN Nury Rodriguez MD   40 mEq at 02/03/22 2312    Or   • potassium chloride (KLOR-CON) packet 40 mEq  40 mEq Oral PRN Nury Rodriguez MD        Or   • potassium chloride 10 mEq in 100 mL IVPB  10 mEq Intravenous Q1H PRN Nury Rodriguez MD       • sennosides-docusate (PERICOLACE) 8.6-50 MG per tablet 2 tablet  2 tablet Oral Nightly Collin Reyes MD   2 tablet at 02/08/22 2034        •  acetaminophen  •  dextrose  •  dextrose  •  glucagon (human recombinant)  •  haloperidol lactate  •  melatonin  •  nitroglycerin  •  ondansetron **OR** ondansetron  •  potassium chloride **OR** potassium chloride **OR** potassium chloride    No Known Allergies  Current medication reviewed for route, type, dose and frequency and are current per MAR at time of dictation.      Intake/Output Summary (Last 24 hours) at 2/9/2022 1429  Last data filed at 2/9/2022 1100  Gross per 24 hour   Intake 390 ml   Output --   Net 390 ml       Physical Exam:    Diagnostics: Reviewed  /68 (BP Location: Left arm, Patient Position: Lying)   Pulse 77   Temp 98 °F (36.7 °C) (Tympanic)   Resp 19   Ht 157.5 cm (62.01\")   Wt 36.8 kg (81 lb 2.1 oz)   SpO2 98%   BMI 14.83 kg/m²     Constitutional:       Interventions: Restrained.      Comments: Elderly   Bilateral wrist restraints   Cardiovascular:      PMI at left midclavicular line. Normal " "rate. Regular rhythm.   Edema:     Peripheral edema absent.   Abdominal:      General: Bowel sounds are normal.      Palpations: Abdomen is soft.      Tenderness: There is no abdominal tenderness.   Neurological:      Mental Status: Alert.      Comments: Oriented to self only    Psychiatric:         Attention and Perception: Perceives visual hallucinations.         Mood and Affect: Mood and affect normal.         Behavior: Behavior is cooperative.         Cognition and Memory: Cognition is impaired.      Comments: She states  \"I see my sister and she is tied up like me\"       Patient status: Disease state: varying .  Functional status: Palliative Performance Scale Score: Performance 60% based on the following measures: Ambulation: Reduced, Activity and Evidence of Disease: Unable to do hobby or some work, significant evidence of disease, Self-Care: Occasional assist necessary,  Intake: Normal or reduced, LOC: Full or confusion   Nutritional status: moderate. Body mass index is 14.83 kg/m².         Family support: The patient receives support from her children..  Advance Directives: Advance Directive Status: Patient does not have advance directive   Advance Care Planning   ACP discussion was held with the patient during this visit. Patient has an advance directive (not in EMR), copy requested.   POA/Healthcare surrogate-Collin.    Impression/Problem List:    1. Dementia with behavioral disturbance  2. Chronic systolic heart failure   3. Cardiomyopathy  4. Hypertension   5. New onset diabetes   6. Severe malnutrition     Recommendations/Plan:  1.Provide support with goals of care  2. [facility] consult     2.  Palliative care encounter  -Patient unable to participate with goals of care conversation. I spoke with patient sonCollin and daughter in Britt king (RN) via telephone. They are located in [place], [place] (son has medical conditions that limit his ability to be here). She does have daughter that is present in [place] " works full time and has an adult disabled child and unable to visit often. The patient also has daughter (Ana) that passed summer 2021 unexpectantly, whom the patient was close too. The family has good understanding of her medical conditions. The report that over the last year she has had progressive decline in her dementia (weeping, reduced oral intake, more agitation, more confusion, increased sleeping,all of which have gotten worst over the last couple months.  At this time they are not sure that she will be able to return to [facility] assisted living because she is requiring more care. They are interested in finding a facility that has different level of care based on patient capabilities (as they recognize with dementia she may progressively decline). We did discuss palliative care and [facility] services. They are interested in receiving information from [facility] services. Ultimately, they would like for patient not be have multiple admissions and her care be managed in facility setting.I did not address CODE status/medical interventions as this has already be addressed.  I will plan to follow up tomorrow. I spoke with RN (Virginia) and CCP (Keerthi).       Thank you for this consult and allowing us to participate in patient's plan of care. Palliative Care Team will continue to follow patient.     Time spent: 60 minutes spent reviewing medical and medication records, assessing and examining patient, discussing with family, answering questions, providing some guidance about a plan and documentation of care, and coordinating care with other healthcare members, with > 50% time spent face to face.       Violet Morales, APRN  2/9/2022

## 2022-02-09 NOTE — PROGRESS NOTES
Continued Stay Note  [facility]     Patient Name: Savannah Andres  MRN: [id number]  Today's Date: 2/9/2022    Admit Date: 2/3/2022     Discharge Plan     Row Name 02/09/22 0951       Plan    Plan Skilled rehab referrals pending vs Return to [facility] with HH for PT and OT pending progress    Plan Comments Spoke with Britt Andres and she states that she and her spouse have choices for skilled rehab referrals : [facility], [facility], [facility] and [facility].  Referrals are pending.  ......................Keerthi Godoy RN               Discharge Codes    No documentation.                     Keerthi Godoy RN

## 2022-02-09 NOTE — NURSING NOTE
At 11:20 patient jumped out of bed, difficult for 2 RNs and 1 PCA to redirect pt back to bed/chair.  Eventually able to get pt to chair.  Pt became increasingly agitated and began to swing at 2 RNs.  RNs prepared and administered 5 mg IM haldol PRN dose.     Reassessment of pt at this time-pt agitation is improved.  VS stable, on tele and pulse ox in no distress.  Calm and resting in chair eating chocolate ice cream.

## 2022-02-09 NOTE — PLAN OF CARE
Goal Outcome Evaluation:  Plan of Care Reviewed With: patient, daughter        Progress: declining  Pt alert but continues to be confused. Pt was pleasantly confused this am but progressively became more agitated and uncooperative and continually got up out of chair and bed. Pt put in bilateral upper extremity wrist restraints around 1245. She had been given IM Haldol about one hour earlier which was not effective yet. Palliative RN came by and talked with pt and she will call pt family and talk to them about goals of care. Dr. Krause did increase haldol PO starting tonight at 2100. Will continue to monitor and follow MD orders. Ordered SCD machine and pt has Accumax pump on bed.

## 2022-02-09 NOTE — DISCHARGE PLACEMENT REQUEST
"Savannah Andres (86 y.o. Female)             Date of Birth Social Security Number Address Home Phone MRN    1935  [id number]    [facility] [address] [phone] [id number]    [employer] Status             None Single       Admission Date Admission Type Admitting Provider Attending Provider Department, Room/Bed    2/3/22 Emergency Stingl, MD Eric Pappas John B, MD [address], [address]    Discharge Date Discharge Disposition Discharge Destination                         Attending Provider: Collin Reyes MD    Allergies: No Known Allergies    Isolation: None   Infection: COVID (History) (02/04/22)   Code Status: No CPR   Advance Care Planning Activity    Ht: 157.5 cm (62.01\")   Wt: 36.8 kg (81 lb 2.1 oz)    Admission Cmt: None   Principal Problem: Dementia with behavioral disturbance (HCC) [F03.91]                 Active Insurance as of 2/3/2022     Primary Coverage     Payor Plan Insurance Group Employer/Plan Group    MEDICARE MEDICARE A & B      Payor Plan Address Payor Plan Phone Number Payor Plan Fax Number Effective Dates    [address] [phone]  7/1/2000 - None Entered    [facility] [id number]       Subscriber Name Subscriber Birth Date Member ID       SAVANNAH ANDRES 1935 [id number]           Secondary Coverage     Payor Plan Insurance Group Employer/Plan Group    AARP MC SUP AARP HEALTH CARE OPTIONS [id number]     Payor Plan Address Payor Plan Phone Number Payor Plan Fax Number Effective Dates    [facility] [phone]  1/1/2016 - None Entered    [address]       [facility] [address]       Subscriber Name Subscriber Birth Date Member ID       SAVANNAH ANDRES 1935 [id number]                 Emergency Contacts      (Rel.) Home Phone Work Phone Mobile Phone    Collin Andres (Son) [phone] -- [phone]    Savannah Andres (Daughter) [phone] -- [phone]              "

## 2022-02-09 NOTE — PROGRESS NOTES
"The patient is up in her chair eating lunch today.  Unfortunately, she refuses to comply with interview with this physician ordering him to leave the room and \"shut [his] fat mouth\".  Staff reports that she has been more agitated and is about to receive an IM dose of haloperidol.  I will increase her scheduled haloperidol to 4 mg twice daily and may consider addition of valproic acid if behavioral issues related to her dementia persist.  She is in no restraint device at this time.  "

## 2022-02-09 NOTE — PROGRESS NOTES
"    Name: Savannah Andres ADMIT: 2/3/2022   : 1935  PCP: Vasu Del Angel MD    MRN: [id number] LOS: 6 days   AGE/SEX: 86 y.o. female  ROOM: [facility]     Subjective   Subjective   Pt is doing better on Haldol. Slept through the night again. Remains out of restraints today. +BM today. Eating/drinking a little better now.  Denies SOA or pain. Says she doesn't think she's ever met me before and she is not sure that I am even \"real\".    Review of Systems   Unable to perform ROS: Dementia        Objective   Objective   Vital Signs  Temp:  [95.7 °F (35.4 °C)-98 °F (36.7 °C)] 97.9 °F (36.6 °C)  Heart Rate:  [62-82] 82  Resp:  [18] 18  BP: (110-156)/(57-76) 156/76  SpO2:  [96 %-99 %] 96 %  on   ;   Device (Oxygen Therapy): room air  Body mass index is 14.83 kg/m².  Physical Exam  Vitals and nursing note reviewed.   Constitutional:       General: She is not in acute distress.     Appearance: She is ill-appearing (chronically). She is not toxic-appearing or diaphoretic.   HENT:      Head: Normocephalic and atraumatic.      Right Ear: External ear normal.      Left Ear: External ear normal.      Mouth/Throat:      Mouth: Mucous membranes are moist.      Pharynx: Oropharynx is clear.   Eyes:      General: No scleral icterus.        Right eye: No discharge.         Left eye: No discharge.      Extraocular Movements: Extraocular movements intact.      Conjunctiva/sclera: Conjunctivae normal.   Cardiovascular:      Rate and Rhythm: Normal rate and regular rhythm.      Pulses: Normal pulses.      Heart sounds: Normal heart sounds.   Pulmonary:      Effort: Pulmonary effort is normal. No respiratory distress.      Breath sounds: Normal breath sounds. No wheezing or rales.   Abdominal:      General: Bowel sounds are normal. There is no distension.      Palpations: Abdomen is soft.      Tenderness: There is no abdominal tenderness.   Musculoskeletal:         General: No swelling. Normal range of motion.      Cervical back: Neck " supple. No rigidity.   Lymphadenopathy:      Cervical: No cervical adenopathy.   Skin:     General: Skin is warm and dry.      Capillary Refill: Capillary refill takes less than 2 seconds.      Coloration: Skin is not jaundiced.      Findings: No rash.   Neurological:      Mental Status: She is alert. Mental status is at baseline.      Cranial Nerves: No cranial nerve deficit.      Coordination: Coordination normal.      Comments: Moves all 4 equally   Psychiatric:         Attention and Perception: Attention normal.         Mood and Affect: Affect is flat.         Speech: Speech is tangential.         Behavior: Behavior is uncooperative and slowed.         Cognition and Memory: Memory is impaired.         Results Review     I reviewed the patient's new clinical results.  Results from last 7 days   Lab Units 02/09/22 0452 02/08/22 0353 02/07/22 1020 02/06/22  0629   WBC 10*3/mm3 5.57 9.62 7.35 6.41   HEMOGLOBIN g/dL 11.9* 12.0 12.9 12.2   PLATELETS 10*3/mm3 217 225 254 226     Results from last 7 days   Lab Units 02/09/22 0452 02/08/22 0353 02/07/22 1020 02/06/22  0629   SODIUM mmol/L 142 146* 144 142   POTASSIUM mmol/L 3.9 4.2 3.9 4.0   CHLORIDE mmol/L 105 110* 107 106   CO2 mmol/L 28.1 23.1 25.0 26.9   BUN mg/dL 23 17 16 24*   CREATININE mg/dL 0.85 0.75 0.76 1.01*   GLUCOSE mg/dL 132* 140* 97 159*   EGFR IF NONAFRICN AM mL/min/1.73 63 73 72 52*     Results from last 7 days   Lab Units 02/09/22 0452 02/08/22 0353 02/06/22  0629 02/03/22  1232   ALBUMIN g/dL 2.90* 3.00* 3.10* 3.60   BILIRUBIN mg/dL 0.4 0.5  --  0.5   ALK PHOS U/L 74 76  --  90   AST (SGOT) U/L 20 26  --  21   ALT (SGPT) U/L 17 17  --  20     Results from last 7 days   Lab Units 02/09/22 0452 02/08/22  0353 02/07/22  1020 02/06/22  0629 02/04/22  0327 02/03/22  1232   CALCIUM mg/dL 9.1 9.2 9.5 9.2   < > 9.7   ALBUMIN g/dL 2.90* 3.00*  --  3.10*  --  3.60   MAGNESIUM mg/dL 2.3 2.2  --  2.0  --  1.8   PHOSPHORUS mg/dL  --   --   --  3.9  --    --     < > = values in this interval not displayed.       COVID19   Date Value Ref Range Status   01/19/2022 Detected (C) Not Detected - Ref. Range Final     Glucose   Date/Time Value Ref Range Status   02/09/2022 1118 162 (H) 70 - 130 mg/dL Final     Comment:     Meter: [id number] : [id number] Davian Hale NA   02/09/2022 0632 144 (H) 70 - 130 mg/dL Final     Comment:     Meter: [id number] : [id number] [place] NA   02/08/2022 2028 99 70 - 130 mg/dL Final     Comment:     Meter: [id number] : [id number] [place] NA   02/08/2022 1551 161 (H) 70 - 130 mg/dL Final     Comment:     Meter: [id number] : [id number] Carlos A Nguyen CNA   02/08/2022 1121 162 (H) 70 - 130 mg/dL Final     Comment:     Meter: [id number] : [id number] Carlos A Finleynda CNA   02/08/2022 0631 120 70 - 130 mg/dL Final     Comment:     Meter: [id number] : [id number] Silva Jayashree PCA   02/07/2022 2324 103 70 - 130 mg/dL Final     Comment:     Meter: [id number] : [id number] Silva Jayashree PCA       CT Head Without Contrast  CT HEAD WITHOUT CONTRAST     HISTORY: Altered mental status.     COMPARISON: None.     FINDINGS: The brain ventricles are symmetrical. Confluent areas of  decreased attenuation are noted involving the white matter of the  cerebral hemispheres bilaterally. Appearance is consistent with  extensive small vessel ischemic disease. There is no evidence of acute  infarction or of intracranial hemorrhage. Further evaluation could be  performed with an MRI examination of the brain as indicated.        Radiation dose reduction techniques were utilized, including automated  exposure control and exposure modulation based on body size.     This report was finalized on 2/4/2022 8:03 AM by Dr. Luis Carols Steward M.D.       Scheduled Medications  haloperidol, 4 mg, Oral, BID  insulin lispro, 0-7 Units, Subcutaneous, TID AC  lisinopril, 7.5 mg, Oral, Daily  metoprolol succinate XL, 50 mg, Oral, Daily  polyethylene  glycol, 17 g, Oral, Daily  senna-docusate sodium, 2 tablet, Oral, Nightly    Infusions   Diet  Diet Soft Texture; Ground; Thin; Cardiac, Consistent Carbohydrate       Assessment/Plan     Active Hospital Problems    Diagnosis  POA   • **Dementia with behavioral disturbance (HCC) [F03.91]  Yes   • Constipation [K59.00]  Yes   • Hypernatremia [E87.0]  No   • Type 2 diabetes mellitus without complication, without long-term current use of insulin (HCC) [E11.9]  Yes   • Severe malnutrition (CMS/HCC) [E43]  Yes   • Cardiomyopathy (HCC) [I42.9]  Yes   • Pulmonary hypertension (HCC) [I27.20]  Yes   • Hypertension [I10]  Yes   • Hypokalemia [E87.6]  Yes   • CHF (congestive heart failure), NYHA class III (HCC) [I50.9]  Yes      Resolved Hospital Problems   No resolved problems to display.       86 y.o. female admitted with Dementia with behavioral disturbance (HCC).    · Dementia with behavioral disturbance: Likely vascular dementia based on CT findings. TSH and B12 okay. Treated initially with Geodon, but stopped by Psychiatry due to potential QT issues. Changed to Zyprexa. Still didn't show any improvement. Psychiatry has now changed Zyprexa to Haldol and seems to be working better for her. Remains out of restraints this AM.   · History of 3rd deg heart block s/p PPM, cardiomyopathy, and chronic systolic CHF: Not volume overloaded on exam, weight stable. Continue beta-blocker, ACE inhibitor. Uses as-needed diuretic as outpatient.  · Hypokalemia: Replaced. Monitor.  · Hypernatremia: improved since staff has been encouraging increased po water.  · Hypertension: BPs acceptable on Lisinopril and Metoprolol.  · DM2 (new diagnosis): A1c 7.7 and she had hyperglycemia on admission. Continue SSI low dose and will continue to monitor requirements. Sugars a little higher now that she is eating more.  · Severe malnutrition/FTT: Nutrition following.  · Constipation: moving bowels now, continue current bowel regimen.      · SCDs for DVT  prophylaxis.  · No CPR.   · Discussed with patient, nursing staff, CCP and care team on multidisciplinary rounds.   · Anticipate discharge to SNU facility when arrangements can be made.    ADDENDUM: pt became very combative this afternoon, required restraint orders again    Collin Reyes MD  [facility]  02/09/22  11:58 EST

## 2022-02-10 LAB
ANION GAP SERPL CALCULATED.3IONS-SCNC: 16 MMOL/L (ref 5–15)
BUN SERPL-MCNC: 19 MG/DL (ref 8–23)
BUN/CREAT SERPL: 23.5 (ref 7–25)
CALCIUM SPEC-SCNC: 9.4 MG/DL (ref 8.6–10.5)
CHLORIDE SERPL-SCNC: 102 MMOL/L (ref 98–107)
CO2 SERPL-SCNC: 22 MMOL/L (ref 22–29)
CREAT SERPL-MCNC: 0.81 MG/DL (ref 0.57–1)
DEPRECATED RDW RBC AUTO: 41 FL (ref 37–54)
ERYTHROCYTE [DISTWIDTH] IN BLOOD BY AUTOMATED COUNT: 12.6 % (ref 12.3–15.4)
GFR SERPL CREATININE-BSD FRML MDRD: 67 ML/MIN/1.73
GLUCOSE BLDC GLUCOMTR-MCNC: 117 MG/DL (ref 70–130)
GLUCOSE BLDC GLUCOMTR-MCNC: 160 MG/DL (ref 70–130)
GLUCOSE BLDC GLUCOMTR-MCNC: 173 MG/DL (ref 70–130)
GLUCOSE SERPL-MCNC: 178 MG/DL (ref 65–99)
HCT VFR BLD AUTO: 38.5 % (ref 34–46.6)
HGB BLD-MCNC: 12.7 G/DL (ref 12–15.9)
MAGNESIUM SERPL-MCNC: 2 MG/DL (ref 1.6–2.4)
MCH RBC QN AUTO: 29.7 PG (ref 26.6–33)
MCHC RBC AUTO-ENTMCNC: 33 G/DL (ref 31.5–35.7)
MCV RBC AUTO: 90 FL (ref 79–97)
PLATELET # BLD AUTO: 254 10*3/MM3 (ref 140–450)
PMV BLD AUTO: 10.6 FL (ref 6–12)
POTASSIUM SERPL-SCNC: 4.1 MMOL/L (ref 3.5–5.2)
RBC # BLD AUTO: 4.28 10*6/MM3 (ref 3.77–5.28)
SODIUM SERPL-SCNC: 140 MMOL/L (ref 136–145)
WBC NRBC COR # BLD: 9.99 10*3/MM3 (ref 3.4–10.8)

## 2022-02-10 PROCEDURE — 83735 ASSAY OF MAGNESIUM: CPT | Performed by: HOSPITALIST

## 2022-02-10 PROCEDURE — 82962 GLUCOSE BLOOD TEST: CPT

## 2022-02-10 PROCEDURE — 80048 BASIC METABOLIC PNL TOTAL CA: CPT | Performed by: HOSPITALIST

## 2022-02-10 PROCEDURE — 85027 COMPLETE CBC AUTOMATED: CPT | Performed by: HOSPITALIST

## 2022-02-10 PROCEDURE — 25010000002 HALOPERIDOL LACTATE PER 5 MG: Performed by: SPECIALIST

## 2022-02-10 PROCEDURE — 99231 SBSQ HOSP IP/OBS SF/LOW 25: CPT | Performed by: NURSE PRACTITIONER

## 2022-02-10 RX ORDER — HALOPERIDOL 2 MG/1
2 TABLET ORAL
Status: DISCONTINUED | OUTPATIENT
Start: 2022-02-10 | End: 2022-02-15

## 2022-02-10 RX ORDER — HALOPERIDOL 5 MG/1
5 TABLET ORAL NIGHTLY
Status: DISCONTINUED | OUTPATIENT
Start: 2022-02-10 | End: 2022-02-17 | Stop reason: HOSPADM

## 2022-02-10 RX ORDER — OLANZAPINE 10 MG/1
2.5 INJECTION, POWDER, LYOPHILIZED, FOR SOLUTION INTRAMUSCULAR ONCE
Status: COMPLETED | OUTPATIENT
Start: 2022-02-10 | End: 2022-02-10

## 2022-02-10 RX ADMIN — OLANZAPINE 2.5 MG: 10 INJECTION, POWDER, LYOPHILIZED, FOR SOLUTION INTRAMUSCULAR at 06:14

## 2022-02-10 RX ADMIN — DOCUSATE SODIUM 50MG AND SENNOSIDES 8.6MG 2 TABLET: 8.6; 5 TABLET, FILM COATED ORAL at 20:17

## 2022-02-10 RX ADMIN — HALOPERIDOL 2 MG: 2 TABLET ORAL at 17:05

## 2022-02-10 RX ADMIN — HALOPERIDOL LACTATE 5 MG: 5 INJECTION, SOLUTION INTRAMUSCULAR at 03:17

## 2022-02-10 RX ADMIN — LISINOPRIL 7.5 MG: 5 TABLET ORAL at 13:14

## 2022-02-10 RX ADMIN — METOPROLOL SUCCINATE 50 MG: 50 TABLET, EXTENDED RELEASE ORAL at 13:13

## 2022-02-10 RX ADMIN — POLYETHYLENE GLYCOL 3350 17 G: 17 POWDER, FOR SOLUTION ORAL at 17:05

## 2022-02-10 RX ADMIN — HALOPERIDOL 5 MG: 5 TABLET ORAL at 20:17

## 2022-02-10 NOTE — CONSULTS
Records reviewed, received update from facility LEATHA Coleman and GLADYS Pendleton. No family at bedside and unable to provide EOS explanation but conversations have been initiated by [facility] via phone yesterday. Family is aware Pt's continues with confusion and agitation with on-going medication adjustments. Family wants to continue to pursue treatment for Mrs Nuñez for now but open to hospice services. Family is aware Pt may not be able to return to her [facility] at the [facility] and may need LTC placement or [facility] with referrals sent and pending. Family will await for a clearer discharge plan. Pt is eligible for routine hospice care at home or LTC setting per [facility] MD Maged Bahena. [facility] will continue to follow hospitalization and will re-evaluate as needed. Updated GLADYS Pendleton and LEATHA [facility].     Thank you for allowing [facility] to participate in the care of this patient.     Roxana Strauss RN  [facility]   [phone]

## 2022-02-10 NOTE — PROGRESS NOTES
Name: Savannah Andres ADMIT: 2/3/2022   : 1935  PCP: Vasu Del Angel MD    MRN: [id number] LOS: 7 days   AGE/SEX: 86 y.o. female  ROOM: [facility]     Subjective   Subjective   Pt doing worse overnight. Remained in restraints. Received PRN doses of Haldol and even a one-time dose of Zyprexa. Finally sleeping now. +BM yesterday. Eating/drinking a little better now.    Review of Systems   Unable to perform ROS: Dementia        Objective   Objective   Vital Signs  Temp:  [97 °F (36.1 °C)-98.3 °F (36.8 °C)] 97.8 °F (36.6 °C)  Heart Rate:  [62-98] 62  Resp:  [16-19] 16  BP: (127-173)/(68-98) 127/72  SpO2:  [95 %-98 %] 95 %  on   ;   Device (Oxygen Therapy): room air  Body mass index is 15.8 kg/m².  Physical Exam  Vitals and nursing note reviewed.   Constitutional:       General: She is not in acute distress.     Appearance: She is ill-appearing (chronically). She is not toxic-appearing or diaphoretic.   HENT:      Head: Normocephalic and atraumatic.      Right Ear: External ear normal.      Left Ear: External ear normal.      Mouth/Throat:      Mouth: Mucous membranes are moist.      Pharynx: Oropharynx is clear.   Eyes:      General: No scleral icterus.        Right eye: No discharge.         Left eye: No discharge.      Extraocular Movements: Extraocular movements intact.      Conjunctiva/sclera: Conjunctivae normal.   Cardiovascular:      Rate and Rhythm: Normal rate and regular rhythm.      Pulses: Normal pulses.      Heart sounds: Normal heart sounds.   Pulmonary:      Effort: Pulmonary effort is normal. No respiratory distress.      Breath sounds: Normal breath sounds. No wheezing or rales.   Abdominal:      General: Bowel sounds are normal. There is no distension.      Palpations: Abdomen is soft.      Tenderness: There is no abdominal tenderness.   Musculoskeletal:         General: No swelling. Normal range of motion.      Cervical back: Neck supple. No rigidity.   Lymphadenopathy:      Cervical: No  cervical adenopathy.   Skin:     General: Skin is warm and dry.      Capillary Refill: Capillary refill takes less than 2 seconds.      Coloration: Skin is not jaundiced.      Findings: No rash.   Neurological:      Mental Status: She is alert. Mental status is at baseline.      Cranial Nerves: No cranial nerve deficit.   Psychiatric:      Comments: Sleeping         Results Review     I reviewed the patient's new clinical results.  Results from last 7 days   Lab Units 02/10/22  0456 02/09/22  0452 02/08/22  0353 02/07/22  1020   WBC 10*3/mm3 9.99 5.57 9.62 7.35   HEMOGLOBIN g/dL 12.7 11.9* 12.0 12.9   PLATELETS 10*3/mm3 254 217 225 254     Results from last 7 days   Lab Units 02/10/22  0456 02/09/22  0452 02/08/22  0353 02/07/22  1020   SODIUM mmol/L 140 142 146* 144   POTASSIUM mmol/L 4.1 3.9 4.2 3.9   CHLORIDE mmol/L 102 105 110* 107   CO2 mmol/L 22.0 28.1 23.1 25.0   BUN mg/dL 19 23 17 16   CREATININE mg/dL 0.81 0.85 0.75 0.76   GLUCOSE mg/dL 178* 132* 140* 97   EGFR IF NONAFRICN AM mL/min/1.73 67 63 73 72     Results from last 7 days   Lab Units 02/09/22 0452 02/08/22 0353 02/06/22  0629 02/03/22  1232   ALBUMIN g/dL 2.90* 3.00* 3.10* 3.60   BILIRUBIN mg/dL 0.4 0.5  --  0.5   ALK PHOS U/L 74 76  --  90   AST (SGOT) U/L 20 26  --  21   ALT (SGPT) U/L 17 17  --  20     Results from last 7 days   Lab Units 02/10/22  0456 02/09/22  0452 02/08/22  0353 02/07/22  1020 02/06/22  0629 02/06/22  0629 02/04/22  0327 02/03/22  1232   CALCIUM mg/dL 9.4 9.1 9.2 9.5   < > 9.2   < > 9.7   ALBUMIN g/dL  --  2.90* 3.00*  --   --  3.10*  --  3.60   MAGNESIUM mg/dL 2.0 2.3 2.2  --   --  2.0  --  1.8   PHOSPHORUS mg/dL  --   --   --   --   --  3.9  --   --     < > = values in this interval not displayed.       COVID19   Date Value Ref Range Status   01/19/2022 Detected (C) Not Detected - Ref. Range Final     Glucose   Date/Time Value Ref Range Status   02/10/2022 1051 117 70 - 130 mg/dL Final     Comment:     Meter: [id number]  : [id number] Shilpi Ortiz NA   02/10/2022 0616 160 (H) 70 - 130 mg/dL Final     Comment:     Meter: [id number] : [id number] Jax Bansal NA   02/09/2022 2046 129 70 - 130 mg/dL Final     Comment:     Meter: [id number] : [id number] Jax Bansal NA   02/09/2022 1643 168 (H) 70 - 130 mg/dL Final     Comment:     Meter: [id number] : [id number] Marcelo Alexia NA   02/09/2022 1118 162 (H) 70 - 130 mg/dL Final     Comment:     Meter: [id number] : [id number] Marcelo Alexia NA   02/09/2022 0632 144 (H) 70 - 130 mg/dL Final     Comment:     Meter: [id number] : [id number] [place] NA   02/08/2022 2028 99 70 - 130 mg/dL Final     Comment:     Meter: [id number] : [id number] [place] NA       CT Head Without Contrast  CT HEAD WITHOUT CONTRAST     HISTORY: Altered mental status.     COMPARISON: None.     FINDINGS: The brain ventricles are symmetrical. Confluent areas of  decreased attenuation are noted involving the white matter of the  cerebral hemispheres bilaterally. Appearance is consistent with  extensive small vessel ischemic disease. There is no evidence of acute  infarction or of intracranial hemorrhage. Further evaluation could be  performed with an MRI examination of the brain as indicated.        Radiation dose reduction techniques were utilized, including automated  exposure control and exposure modulation based on body size.     This report was finalized on 2/4/2022 8:03 AM by Dr. Luis Carlos Steward M.D.       Scheduled Medications  haloperidol, 4 mg, Oral, BID  insulin lispro, 0-7 Units, Subcutaneous, TID AC  lisinopril, 7.5 mg, Oral, Daily  metoprolol succinate XL, 50 mg, Oral, Daily  polyethylene glycol, 17 g, Oral, Daily  senna-docusate sodium, 2 tablet, Oral, Nightly    Infusions   Diet  Diet Soft Texture; Ground; Thin; Cardiac, Consistent Carbohydrate       Assessment/Plan     Active Hospital Problems    Diagnosis  POA   • **Dementia with behavioral disturbance (HCC) [F03.91]   Yes   • Constipation [K59.00]  Yes   • Hypernatremia [E87.0]  No   • Type 2 diabetes mellitus without complication, without long-term current use of insulin (HCC) [E11.9]  Yes   • Severe malnutrition (CMS/HCC) [E43]  Yes   • Cardiomyopathy (HCC) [I42.9]  Yes   • Pulmonary hypertension (HCC) [I27.20]  Yes   • Hypertension [I10]  Yes   • Hypokalemia [E87.6]  Yes   • CHF (congestive heart failure), NYHA class III (HCC) [I50.9]  Yes      Resolved Hospital Problems   No resolved problems to display.       86 y.o. female admitted with Dementia with behavioral disturbance (HCC).    · Dementia with behavioral disturbance: Likely vascular dementia based on CT findings. TSH and B12 okay. Treated initially with Geodon, but stopped by Psychiatry due to potential QT issues. Changed to Zyprexa. Still didn't show any improvement. Psychiatry has now changed Zyprexa to Haldol and seemed to be working a little better for her, but overnight got worse again. Back in restraints now. Await Psych eval and recs today.  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 Continue beta-blocker, ACE inhibitor. Uses as-needed diuretic as outpatient.  · Hypokalemia: Replaced. Monitor.  · Hypernatremia: improved since staff has been encouraging increased po water.  · Hypertension: BPs acceptable on Lisinopril and Metoprolol. Intermittent elevations likely due to agitation.  · DM2 (new diagnosis): A1c 7.7 and she had hyperglycemia on admission. Continue SSI low dose and will continue to monitor requirements. Sugars a little higher now that she is eating more.  · Severe malnutrition/FTT: Nutrition following.  · Constipation: moving bowels now, continue current bowel regimen.      · SCDs for DVT prophylaxis.  · No CPR.   · Discussed with patient, nursing staff, CCP and care team on multidisciplinary rounds.   · Anticipate discharge to SNU facility when arrangements can be  made.      Collin Ryees MD  [facility]  02/10/22  11:55 EST

## 2022-02-10 NOTE — PROGRESS NOTES
Palliative Care Daily Progress Note   Chief complaint-follow up goals of care/advanced care planning    Code Status:   Code Status and Medical Interventions:   Ordered at: 22 9949     Medical Intervention Limits:    NO intubation (DNI)    NO cardioversion    NO dialysis     Level Of Support Discussed With:    Next of Kin (If No Surrogate)     Code Status (Patient has no pulse and is not breathing):    No CPR (Do Not Attempt to Resuscitate)     Medical Interventions (Patient has pulse or is breathing):    Limited Support      Advanced Directives: Advance Directive Status: Patient has advance directive, copy requested, Patient does not have advance directive   Goals of Care: Ongoing.     S: Medical record reviewed. Events noted.  Patient resting bed, lethargic. She remains in restraints. She required PRN Haldol 5mg and Zyprexa 2.5 last night for agitation. VSS. Labs unremarkable. She did participate with PT yesterday. Appetite fair this am.  No family present. Spoke with RN (Virginia)       Review of Systems   Unable to perform ROS: dementia     Pain Assessment  PAINAD Breathin-->normal  PAINAD Negative Vocalization: 0-->none  PAINAD Facial Expression: 0-->smiling or inexpressive  PAINAD Body Language: 0-->relaxed  PAINAD Consolability: 0-->no need to console  PAINAD Score: 0    O:     Intake/Output Summary (Last 24 hours) at 2/10/2022 1038  Last data filed at 2/10/2022 0511  Gross per 24 hour   Intake 660 ml   Output --   Net 660 ml       Diagnostics: Reviewed    Current Facility-Administered Medications   Medication Dose Route Frequency Provider Last Rate Last Admin   • acetaminophen (TYLENOL) tablet 650 mg  650 mg Oral Q4H PRN Nury Rodriguez MD       • dextrose (D50W) (25 g/50 mL) IV injection 25 g  25 g Intravenous Q15 Min PRN Jose Alejandro Sandra MD       • dextrose (GLUTOSE) oral gel 15 g  15 g Oral Q15 Min PRN Jose Alejandro Sandra MD       • glucagon (human recombinant) (GLUCAGEN DIAGNOSTIC)  injection 1 mg  1 mg Subcutaneous Q15 Min PRN Jose Alejandro Sandra MD       • haloperidol (HALDOL) tablet 4 mg  4 mg Oral BID Brian Grande III, MD   4 mg at 02/09/22 2117   • haloperidol lactate (HALDOL) injection 5 mg  5 mg Intramuscular Q6H PRN Brian Grande III, MD   5 mg at 02/10/22 0317   • insulin lispro (ADMELOG) injection 0-7 Units  0-7 Units Subcutaneous TID AC Jose Alejandro Sandra MD   2 Units at 02/09/22 1700   • lisinopril (PRINIVIL,ZESTRIL) tablet 7.5 mg  7.5 mg Oral Daily StingNury mccallum MD   7.5 mg at 02/09/22 0820   • melatonin tablet 3 mg  3 mg Oral Nightly PRN Michael, Nury Cash MD       • metoprolol succinate XL (TOPROL-XL) 24 hr tablet 50 mg  50 mg Oral Daily StingNury mccallum MD   50 mg at 02/09/22 0821   • nitroglycerin (NITROSTAT) SL tablet 0.4 mg  0.4 mg Sublingual Q5 Min PRN Nury Rodriguez MD       • ondansetron (ZOFRAN) tablet 4 mg  4 mg Oral Q6H PRN Nury Rodriguez MD        Or   • ondansetron (ZOFRAN) injection 4 mg  4 mg Intravenous Q6H PRN Nury Rodriguez MD       • polyethylene glycol (MIRALAX) packet 17 g  17 g Oral Daily Collin Reyes MD   17 g at 02/09/22 0821   • potassium chloride (K-DUR,KLOR-CON) ER tablet 40 mEq  40 mEq Oral PRN Nury Rodriguez MD   40 mEq at 02/03/22 2312    Or   • potassium chloride (KLOR-CON) packet 40 mEq  40 mEq Oral PRN Nury Rodriguez MD        Or   • potassium chloride 10 mEq in 100 mL IVPB  10 mEq Intravenous Q1H PRN Nury Rodriguez MD       • sennosides-docusate (PERICOLACE) 8.6-50 MG per tablet 2 tablet  2 tablet Oral Nightly Collin Reyes MD   2 tablet at 02/09/22 2117        •  acetaminophen  •  dextrose  •  dextrose  •  glucagon (human recombinant)  •  haloperidol lactate  •  melatonin  •  nitroglycerin  •  ondansetron **OR** ondansetron  •  potassium chloride **OR** potassium chloride **OR** potassium chloride    A:    /72 (BP Location: Left arm, Patient  "Position: Lying)   Pulse 62   Temp 97.8 °F (36.6 °C) (Oral)   Resp 16   Ht 157.5 cm (62.01\")   Wt 39.2 kg (86 lb 6.4 oz)   SpO2 95%   BMI 15.80 kg/m²     Physical Exam      Patient status: Disease state: varying .  Functional status: Palliative Performance Scale Score: Performance 60% based on the following measures: Ambulation: Reduced, Activity and Evidence of Disease: Unable to do hobby or some work, significant evidence of disease, Self-Care: Occasional assist necessary,  Intake: Normal or reduced, LOC: Full or confusion   Nutritional status: moderate. Body mass index is 14.83 kg/m².      Family support: The patient receives support from her children..  Advance Directives: Advance Directive Status: Patient does not have advance directive      Advance Care Planning     ACP discussion was held with the patient during this visit. Patient has an advance directive (not in EMR), copy requested.   POA/Healthcare surrogate-Collin.     Impression/Problem List:     1. Dementia with behavioral disturbance  2. Chronic systolic heart failure   3. Cardiomyopathy  4. Hypertension   5. New onset diabetes   6. Severe malnutrition      Recommendations/Plan:  1.Provide support with goals of care      2.  Palliative care encounter  2/9/2022-Patient unable to participate with goals of care conversation. I spoke with patient son, Collin and daughter in Britt king (RN) via telephone. They are located in [place], [place] (son has medical conditions that limit his ability to be here). She does have daughter that is present in [place] works full time and has an adult disabled child and unable to visit often. The patient also has daughter (Ana) that passed summer 2021 unexpectantly, whom the patient was close too. The family has good understanding of her medical conditions. The report that over the last year she has had progressive decline in her dementia (weeping, reduced oral intake, more agitation, more confusion, increased " sleeping,all of which have gotten worst over the last couple months.  At this time they are not sure that she will be able to return [facility] living because she is requiring more care. They are interested in finding a facility that has different level of care based on patient capabilities (as they recognize with dementia she may progressively decline). We did discuss palliative care and [facility]. They are interested in receiving information from [facility]. Ultimately, they would like for patient not be have multiple admissions and her care be managed in facility setting.I did not address CODE status/medical interventions as this has already be addressed.  I will plan to follow up tomorrow. I spoke with RN (Virginia) and CCP (Keerthi).     2/10/2022- Patient remains unable to participate in goals of care conversation. I will  reached out to patient son/daughter in law again as well as follow up and left voicemail to return call @ [phone]: I returned call to patient, daughter in law, Britt. She tells me both her and her  spoke with [facility] today and very receptive of their services.  She did find the patient durable POA and will send it via e-mail. She was given update from earlier today regarding patient as well.  The plan is to manage her behavorial disturbances and find facility placement with [facility] services to follow for support. They ideally want a facility that will be able to manage all her medications and symptom management.  I also spoke with Virginia Dawson RN, and CCP (Keerthi).      Thank you for this consult and allowing us to participate in patient's plan of care. Palliative Care Team will sign off and see MERYL Ivey  2/10/2022

## 2022-02-10 NOTE — CONSULTS
The patient continues to require multiple as needed's at nighttime.  I will try to change the dosing schedule of her Haldol to address her sundowning behaviors.  I may consider addition of Depakote, but also feel as though the palliative route may be the most appropriate given the patient's rapid recent decline probably accelerated by her treatment for COVID-19.

## 2022-02-10 NOTE — PLAN OF CARE
Goal Outcome Evaluation: pt is confused, having visual hallucinations and uncooperative. pt remains on BUE soft wrist restraints. pt became very agitated, PRN intamuscular haldol given. pt on room air. Incontinence care performed. Palliative to follow up today to further discuss plan of care for patient.

## 2022-02-11 LAB
ANION GAP SERPL CALCULATED.3IONS-SCNC: 7.7 MMOL/L (ref 5–15)
BUN SERPL-MCNC: 19 MG/DL (ref 8–23)
BUN/CREAT SERPL: 25.7 (ref 7–25)
CALCIUM SPEC-SCNC: 8.8 MG/DL (ref 8.6–10.5)
CHLORIDE SERPL-SCNC: 106 MMOL/L (ref 98–107)
CO2 SERPL-SCNC: 27.3 MMOL/L (ref 22–29)
CREAT SERPL-MCNC: 0.74 MG/DL (ref 0.57–1)
DEPRECATED RDW RBC AUTO: 41 FL (ref 37–54)
ERYTHROCYTE [DISTWIDTH] IN BLOOD BY AUTOMATED COUNT: 12.7 % (ref 12.3–15.4)
GFR SERPL CREATININE-BSD FRML MDRD: 74 ML/MIN/1.73
GLUCOSE BLDC GLUCOMTR-MCNC: 110 MG/DL (ref 70–130)
GLUCOSE BLDC GLUCOMTR-MCNC: 112 MG/DL (ref 70–130)
GLUCOSE BLDC GLUCOMTR-MCNC: 141 MG/DL (ref 70–130)
GLUCOSE BLDC GLUCOMTR-MCNC: 191 MG/DL (ref 70–130)
GLUCOSE SERPL-MCNC: 117 MG/DL (ref 65–99)
HCT VFR BLD AUTO: 35.3 % (ref 34–46.6)
HGB BLD-MCNC: 11.8 G/DL (ref 12–15.9)
MAGNESIUM SERPL-MCNC: 2.1 MG/DL (ref 1.6–2.4)
MCH RBC QN AUTO: 29.6 PG (ref 26.6–33)
MCHC RBC AUTO-ENTMCNC: 33.4 G/DL (ref 31.5–35.7)
MCV RBC AUTO: 88.5 FL (ref 79–97)
PLATELET # BLD AUTO: 209 10*3/MM3 (ref 140–450)
PMV BLD AUTO: 10.5 FL (ref 6–12)
POTASSIUM SERPL-SCNC: 3.7 MMOL/L (ref 3.5–5.2)
RBC # BLD AUTO: 3.99 10*6/MM3 (ref 3.77–5.28)
SODIUM SERPL-SCNC: 141 MMOL/L (ref 136–145)
WBC NRBC COR # BLD: 6.35 10*3/MM3 (ref 3.4–10.8)

## 2022-02-11 PROCEDURE — 82962 GLUCOSE BLOOD TEST: CPT

## 2022-02-11 PROCEDURE — 83735 ASSAY OF MAGNESIUM: CPT | Performed by: HOSPITALIST

## 2022-02-11 PROCEDURE — 80048 BASIC METABOLIC PNL TOTAL CA: CPT | Performed by: HOSPITALIST

## 2022-02-11 PROCEDURE — 97110 THERAPEUTIC EXERCISES: CPT

## 2022-02-11 PROCEDURE — 85027 COMPLETE CBC AUTOMATED: CPT | Performed by: HOSPITALIST

## 2022-02-11 RX ADMIN — DOCUSATE SODIUM 50MG AND SENNOSIDES 8.6MG 2 TABLET: 8.6; 5 TABLET, FILM COATED ORAL at 20:04

## 2022-02-11 RX ADMIN — HALOPERIDOL 5 MG: 5 TABLET ORAL at 20:04

## 2022-02-11 RX ADMIN — HALOPERIDOL 2 MG: 2 TABLET ORAL at 17:33

## 2022-02-11 NOTE — PLAN OF CARE
Goal Outcome Evaluation:  Plan of Care Reviewed With: patient           Outcome Summary: Restraints removed today at 0940.  Patient calm and cooperative.

## 2022-02-11 NOTE — PLAN OF CARE
Goal Outcome Evaluation:  Plan of Care Reviewed With: patient           Outcome Summary: Participated wellwith physical therapy again today.  Able to ambulate 150' w/ contact to min A  with handheld assist, slow shuffling steps.  Minimally unsteady but no overt loss of balance.  Recommend DC back to facility with 'home PT'

## 2022-02-11 NOTE — PLAN OF CARE
Goal Outcome Evaluation:  Plan of Care Reviewed With: patient        Progress: no change  Outcome Summary: VSS, pt confused, oriented to self only. pt remains in restraints, slept for most of the night. was combative and trying tog et out of bed at beginning of shift. pt refused temperature at 0400. incontinence care as needed. [facility] following. will ctm

## 2022-02-11 NOTE — PLAN OF CARE
Goal Outcome Evaluation:  Plan of Care Reviewed With: patient, daughter        Progress: no change  Pt up most of the night per report. She is sleeping peacefully now but is arousable. She continues to be in restraints r/t behavior. Spoke with Palliative RN today who met with patient and has talked with pts daughter in law, Britt who is a nurse in [place]. She has updated the family and hopefully the medications will be adjusted so that they are more effective for the pt soon. Dr. Krause is adjusting her psych meds. Care manager looking for placement for when pt is ready for d/c.

## 2022-02-11 NOTE — PROGRESS NOTES
Continued Stay Note  [facility]     Patient Name: Savannah Andres  MRN: [id number]  Today's Date: 2/11/2022    Admit Date: 2/3/2022     Discharge Plan     Row Name 02/11/22 1636       Plan    Plan Return to [facility] with supports vs Short term skilled rehab with long term private pay to follow    Plan Comments Follow call to patient's son Collin Andres [phone].  Spoke with Collin and his spouse Britt.  DC planning discussed.  After discussing that patient ambulated with ' patient may be able to return to [facility] with memory care and supports.  They will reach out to [facility] to discuss memory care and support services private pay.  They asked CCP to make referrals for skilled rehab in [place].  Discussed that they could consider referrals in there area.  Britt states it is grey there alot and Savannah would not like it.  Encouraged that they visit patient to see patient.  Multi referrals are in EPIC........................Keerthi Godoy RN               Discharge Codes    No documentation.               Expected Discharge Date and Time     Expected Discharge Date Expected Discharge Time    Feb 15, 2022             Keerthi Godoy RN

## 2022-02-11 NOTE — PROGRESS NOTES
"DAILY PROGRESS NOTE  [facility]    Patient Identification:  Name: Savannah Andres  Age: 86 y.o.  Sex: female  :  1935  MRN: [id number]         Primary Care Physician: Vasu Del Angel MD    Subjective:  Interval History:She is confused.     Objective:    Scheduled Meds:haloperidol, 2 mg, Oral, Daily With Dinner  haloperidol, 5 mg, Oral, Nightly  insulin lispro, 0-7 Units, Subcutaneous, TID AC  lisinopril, 7.5 mg, Oral, Daily  metoprolol succinate XL, 50 mg, Oral, Daily  polyethylene glycol, 17 g, Oral, Daily  senna-docusate sodium, 2 tablet, Oral, Nightly      Continuous Infusions:     Vital signs in last 24 hours:  Temp:  [97.1 °F (36.2 °C)-98 °F (36.7 °C)] 98 °F (36.7 °C)  Heart Rate:  [63-80] 71  Resp:  [16-18] 18  BP: (125-142)/(68-81) 131/68    Intake/Output:    Intake/Output Summary (Last 24 hours) at 2022 1141  Last data filed at 2/10/2022 1800  Gross per 24 hour   Intake 420 ml   Output --   Net 420 ml       Exam:  /68 (BP Location: Left arm, Patient Position: Lying)   Pulse 71   Temp 98 °F (36.7 °C) (Oral)   Resp 18   Ht 157.5 cm (62.01\")   Wt 44.9 kg (99 lb)   SpO2 96%   BMI 18.10 kg/m²     General Appearance:    confused, no distress   Head:    Normocephalic, without obvious abnormality, atraumatic   Eyes:       Throat:   Lips, tongue, gums normal   Neck:   Supple, symmetrical, trachea midline, no JVD   Lungs:     Clear to auscultation bilaterally, respirations unlabored   Chest Wall:    No tenderness or deformity    Heart:    Regular rate and rhythm, S1 and S2 normal, no murmur,no  Rub or gallop   Abdomen:     Soft, nontender, bowel sounds active, no masses, no organomegaly    Extremities:   Extremities normal, atraumatic, no cyanosis or edema   Pulses:      Skin:   Skin is warm and dry,  no rashes or palpable lesions   Neurologic:   no focal deficits noted, confused.      Lab Results (last 72 hours)     Procedure Component Value Units Date/Time    POC Glucose " Once [[id number]]  (Normal) Collected: 02/11/22 1118    Specimen: Blood Updated: 02/11/22 1130     Glucose 110 mg/dL      Comment: Meter: [id number] : [id number] Xiang MAIER       Magnesium [[id number]]  (Normal) Collected: 02/11/22 0621    Specimen: Blood Updated: 02/11/22 0917     Magnesium 2.1 mg/dL     Basic Metabolic Panel [[id number]]  (Abnormal) Collected: 02/11/22 0621    Specimen: Blood Updated: 02/11/22 0746     Glucose 117 mg/dL      BUN 19 mg/dL      Creatinine 0.74 mg/dL      Sodium 141 mmol/L      Potassium 3.7 mmol/L      Chloride 106 mmol/L      CO2 27.3 mmol/L      Calcium 8.8 mg/dL      eGFR Non African Amer 74 mL/min/1.73      BUN/Creatinine Ratio 25.7     Anion Gap 7.7 mmol/L     Narrative:      GFR Normal >60  Chronic Kidney Disease <60  Kidney Failure <15      CBC (No Diff) [[id number]]  (Abnormal) Collected: 02/11/22 0621    Specimen: Blood Updated: 02/11/22 0718     WBC 6.35 10*3/mm3      RBC 3.99 10*6/mm3      Hemoglobin 11.8 g/dL      Hematocrit 35.3 %      MCV 88.5 fL      MCH 29.6 pg      MCHC 33.4 g/dL      RDW 12.7 %      RDW-SD 41.0 fl      MPV 10.5 fL      Platelets 209 10*3/mm3     POC Glucose Once [[id number]]  (Normal) Collected: 02/11/22 0610    Specimen: Blood Updated: 02/11/22 0612     Glucose 112 mg/dL      Comment: Meter: [id number] : [id number] [place] Donna CNA       POC Glucose Once [[id number]]  (Abnormal) Collected: 02/10/22 2038    Specimen: Blood Updated: 02/10/22 2039     Glucose 173 mg/dL      Comment: Meter: [id number] : [id number] [place] Donna CNA       POC Glucose Once [[id number]]  (Normal) Collected: 02/10/22 1051    Specimen: Blood Updated: 02/10/22 1053     Glucose 117 mg/dL      Comment: Meter: [id number] : [id number] Aabye Angel NA       Basic Metabolic Panel [[id number]]  (Abnormal) Collected: 02/10/22 0456    Specimen: Blood Updated: 02/10/22 0639     Glucose 178 mg/dL      BUN 19 mg/dL      Creatinine 0.81 mg/dL      Sodium 140 mmol/L       Potassium 4.1 mmol/L      Chloride 102 mmol/L      CO2 22.0 mmol/L      Calcium 9.4 mg/dL      eGFR Non African Amer 67 mL/min/1.73      BUN/Creatinine Ratio 23.5     Anion Gap 16.0 mmol/L     Narrative:      GFR Normal >60  Chronic Kidney Disease <60  Kidney Failure <15      Magnesium [[id number]]  (Normal) Collected: 02/10/22 0456    Specimen: Blood Updated: 02/10/22 0639     Magnesium 2.0 mg/dL     POC Glucose Once [[id number]]  (Abnormal) Collected: 02/10/22 0616    Specimen: Blood Updated: 02/10/22 0618     Glucose 160 mg/dL      Comment: Meter: [id number] : [id number] Jax MAIER       CBC (No Diff) [[id number]]  (Normal) Collected: 02/10/22 0456    Specimen: Blood Updated: 02/10/22 0601     WBC 9.99 10*3/mm3      RBC 4.28 10*6/mm3      Hemoglobin 12.7 g/dL      Hematocrit 38.5 %      MCV 90.0 fL      MCH 29.7 pg      MCHC 33.0 g/dL      RDW 12.6 %      RDW-SD 41.0 fl      MPV 10.6 fL      Platelets 254 10*3/mm3     POC Glucose Once [[id number]]  (Normal) Collected: 02/09/22 2046    Specimen: Blood Updated: 02/09/22 2047     Glucose 129 mg/dL      Comment: Meter: [id number] : [id number] Jax MAIER       POC Glucose Once [[id number]]  (Abnormal) Collected: 02/09/22 1643    Specimen: Blood Updated: 02/09/22 1644     Glucose 168 mg/dL      Comment: Meter: [id number] : [id number] Davian MAIER       POC Glucose Once [[id number]]  (Abnormal) Collected: 02/09/22 1118    Specimen: Blood Updated: 02/09/22 1120     Glucose 162 mg/dL      Comment: Meter: [id number] : [id number] Davian MAIER       Comprehensive Metabolic Panel [[id number]]  (Abnormal) Collected: 02/09/22 0452    Specimen: Blood Updated: 02/09/22 0635     Glucose 132 mg/dL      BUN 23 mg/dL      Creatinine 0.85 mg/dL      Sodium 142 mmol/L      Potassium 3.9 mmol/L      Chloride 105 mmol/L      CO2 28.1 mmol/L      Calcium 9.1 mg/dL      Total Protein 6.1 g/dL      Albumin 2.90 g/dL      ALT (SGPT) 17 U/L      AST (SGOT)  20 U/L      Alkaline Phosphatase 74 U/L      Total Bilirubin 0.4 mg/dL      eGFR Non African Amer 63 mL/min/1.73      Globulin 3.2 gm/dL      A/G Ratio 0.9 g/dL      BUN/Creatinine Ratio 27.1     Anion Gap 8.9 mmol/L     Narrative:      GFR Normal >60  Chronic Kidney Disease <60  Kidney Failure <15      Magnesium [[id number]]  (Normal) Collected: 02/09/22 0452    Specimen: Blood Updated: 02/09/22 0635     Magnesium 2.3 mg/dL     POC Glucose Once [[id number]]  (Abnormal) Collected: 02/09/22 0632    Specimen: Blood Updated: 02/09/22 0634     Glucose 144 mg/dL      Comment: Meter: [id number] : [id number] [employer] LIVIER       CBC (No Diff) [[id number]]  (Abnormal) Collected: 02/09/22 0452    Specimen: Blood Updated: 02/09/22 0557     WBC 5.57 10*3/mm3      RBC 4.05 10*6/mm3      Hemoglobin 11.9 g/dL      Hematocrit 37.3 %      MCV 92.1 fL      MCH 29.4 pg      MCHC 31.9 g/dL      RDW 12.6 %      RDW-SD 43.3 fl      MPV 10.5 fL      Platelets 217 10*3/mm3     POC Glucose Once [[id number]]  (Normal) Collected: 02/08/22 2028    Specimen: Blood Updated: 02/08/22 2031     Glucose 99 mg/dL      Comment: Meter: [id number] : [id number] [employer] NA       POC Glucose Once [[id number]]  (Abnormal) Collected: 02/08/22 1551    Specimen: Blood Updated: 02/08/22 1552     Glucose 161 mg/dL      Comment: Meter: [id number] : [id number] Carlos A Nguyen CNA           Data Review:  Results from last 7 days   Lab Units 02/11/22  0621 02/10/22  0456 02/10/22  0456 02/09/22  0452 02/09/22  0452   SODIUM mmol/L 141  --  140  --  142   POTASSIUM mmol/L 3.7  --  4.1  --  3.9   CHLORIDE mmol/L 106  --  102  --  105   CO2 mmol/L 27.3  --  22.0  --  28.1   BUN mg/dL 19  --  19  --  23   CREATININE mg/dL 0.74  --  0.81  --  0.85   GLUCOSE mg/dL 117*   < > 178*   < > 132*   CALCIUM mg/dL 8.8  --  9.4  --  9.1    < > = values in this interval not displayed.     Results from last 7 days   Lab Units 02/11/22  0621 02/10/22  0456 02/09/22  0452    WBC 10*3/mm3 6.35 9.99 5.57   HEMOGLOBIN g/dL 11.8* 12.7 11.9*   HEMATOCRIT % 35.3 38.5 37.3   PLATELETS 10*3/mm3 209 254 217     Results from last 7 days   Lab Units 02/06/22  0629   TSH uIU/mL 1.360     Results from last 7 days   Lab Units 02/06/22  0629   HEMOGLOBIN A1C % 7.79*     Lab Results   Lab Value Date/Time    TROPONINT 0.101 (C) 11/22/2016 1745         Results from last 7 days   Lab Units 02/09/22  0452 02/08/22  0353   ALK PHOS U/L 74 76   BILIRUBIN mg/dL 0.4 0.5   ALT (SGPT) U/L 17 17   AST (SGOT) U/L 20 26     Results from last 7 days   Lab Units 02/06/22  0629   TSH uIU/mL 1.360     Results from last 7 days   Lab Units 02/06/22  0629   HEMOGLOBIN A1C % 7.79*     Glucose   Date/Time Value Ref Range Status   02/11/2022 1118 110 70 - 130 mg/dL Final     Comment:     Meter: [id number] : [id number] Xiang Maldonado NA   02/11/2022 0610 112 70 - 130 mg/dL Final     Comment:     Meter: [id number] : [id number] [place] Tyla CNA   02/10/2022 2038 173 (H) 70 - 130 mg/dL Final     Comment:     Meter: [id number] : [id number] Vielka Thompson CNA   02/10/2022 1051 117 70 - 130 mg/dL Final     Comment:     Meter: [id number] : [id number] Shilpi Ortiz NA   02/10/2022 0616 160 (H) 70 - 130 mg/dL Final     Comment:     Meter: [id number] : [id number] Jax Bansal NA   02/09/2022 2046 129 70 - 130 mg/dL Final     Comment:     Meter: [id number] : [id number] Jax Bansal NA   02/09/2022 1643 168 (H) 70 - 130 mg/dL Final     Comment:     Meter: [id number] : [id number] Davian Hale NA   02/09/2022 1118 162 (H) 70 - 130 mg/dL Final     Comment:     Meter: [id number] : [id number] Davian MAIER           Past Medical History:   Diagnosis Date   • Bilateral leg edema    • 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   • CHF (congestive heart failure) (HCC)    • Hypertension    • Loss of consciousness (HCC)    • Skin cyst    • SOB (shortness of breath)    • Symptomatic bradycardia    • Syncope         Assessment:  Active Hospital Problems    Diagnosis  POA   • **Dementia with behavioral disturbance (HCC) [F03.91]  Yes   • Constipation [K59.00]  Yes   • Hypernatremia [E87.0]  No   • Type 2 diabetes mellitus without complication, without long-term current use of insulin (HCC) [E11.9]  Yes   • Severe malnutrition (CMS/HCC) [E43]  Yes   • Cardiomyopathy (HCC) [I42.9]  Yes   • Pulmonary hypertension (HCC) [I27.20]  Yes   • Hypertension [I10]  Yes   • Hypokalemia [E87.6]  Yes   • CHF (congestive heart failure), NYHA class III (HCC) [I50.9]  Yes      Resolved Hospital Problems   No resolved problems to display.       Plan:  Continue with current RX. Psych consult appreciated. Follow lab.  Restraints as needed.    Iker Harrison MD  2/11/2022  11:41 EST

## 2022-02-11 NOTE — PROGRESS NOTES
The patient awakens for interview today and is notably less irritable.  She is oriented to person only.  She remains confused and is continued to require restraints secondary to efforts to leave her bed and episodes of combativeness.

## 2022-02-11 NOTE — PROGRESS NOTES
"Adult Nutrition  Assessment/PES    Patient Name:  Savannah Andres  YOB: 1935  MRN: [id number]  Admit Date:  2/3/2022    Assessment Date:  2/11/2022    Comments:  F/u severe malnutrition. MST 3. BMI 18.1. Pt on soft texture; ground; thin; cardiac, consistent carbohydrate diet. Pt receives Boost Glucose Control TID and Magic cup BID both in chocolate. Average intake 46% x 6 meals. Pt was extremely confused. Pt could not answer any diet related questions and told me she was just admitted today. Pt did not know if she had tried the Boost Plus but said she'll love it if it's chocolate. Nurse reported pt hasn't eaten well today. Continue to follow.     Reason for Assessment     Row Name 02/11/22 145          Reason for Assessment    Reason For Assessment follow-up protocol     Diagnosis other (see comments); neurologic conditions  admit w/: hypokalemia, dementia with behavioral disturbance, diarrhea, memory problem, severe malnutrition, T2DM, consitpation, high sodium levels     Identified At Risk by Screening Criteria MST SCORE 2+                Nutrition/Diet History     Row Name 02/11/22 1457          Nutrition/Diet History    Typical Food/Fluid Intake Pt not eating well today per nurse. Pt was extremely confused and could not tell me how she's been eating.     Food Preferences Very insistent that she loves anything chocolate.     Factors Affecting Nutritional Intake impaired cognitive status/motor control                Anthropometrics     Row Name 02/11/22 1502 02/11/22 1459       Anthropometrics    Height 157.5 cm (62.01\") 157.5 cm (62.01\")    Weight -- 44.9 kg (98 lb 15.8 oz)  not weighed by RD       Ideal Body Weight (IBW)    Ideal Body Weight (IBW) (kg) 50.45 50.45    % Ideal Body Weight -- 89    % of Ideal Body Weight Assessment -- 80-90%: mild deficit       Body Mass Index (BMI)    BMI (kg/m2) -- 18.14    BMI Assessment -- BMI 17-18.4: protein-energy malnutrition grade I               " "Labs/Tests/Procedures/Meds     Row Name 02/11/22 1500          Labs/Procedures/Meds    Lab Results Reviewed reviewed     Lab Results Comments hemoglobin a1c (high) 7.79, albumin (low)            Diagnostic Tests/Procedures    Diagnostic Test/Procedure Reviewed reviewed     Diagnostic Test/Procedures Comments CT            Medications    Pertinent Medications Reviewed reviewed     Pertinent Medications Comments haldol, insulin, lisinopril, metoprolol succinate, polyethylene glycol, pericolace, prn: haldol, potassium chloride                Physical Findings     Row Name 02/11/22 1502          Physical Findings    Overall Physical Appearance loss of muscle mass; generalized wasting; loss of subcutaneous fat                Estimated/Assessed Needs     Row Name 02/11/22 1502 02/11/22 1459       Calculation Measurements    Weight Used For Calculations 44.9 kg (98 lb 15.8 oz) --    Height 157.5 cm (62.01\") 157.5 cm (62.01\")       Estimated/Assessed Needs    Additional Documentation Fluid Requirements (Group); Protein Requirements (Group); KCAL/KG (Group) --       KCAL/KG    35 Kcal/Kg (kcal) 1571.5 --    40 Kcal/Kg (kcal) 1796 --       Protein Requirements    Weight Used For Protein Calculations 44.9 kg (98 lb 15.8 oz) --    Est Protein Requirement Amount (gms/kg) 1.5 gm protein --    Estimated Protein Requirements (gms/day) 67.35 --       Fluid Requirements    Fluid Requirements (mL/day) 1600 --    RDA Method (mL) 1600 --               Nutrition Prescription Ordered     Row Name 02/11/22 1502          Nutrition Prescription PO    Current PO Diet Soft Texture     Texture Ground     Fluid Consistency Thin     Supplement Boost Glucose Control (Glucerna Shake); Magic Cup  Boost glucose control TID, magic cup BID     Common Modifiers Cardiac; Consistent Carbohydrate                Evaluation of Received Nutrient/Fluid Intake     Row Name 02/11/22 1503          PO Evaluation    % PO Intake 46% average x 6 meals           "           Problem/Interventions:   Problem 1     Row Name 02/11/22 1503          Nutrition Diagnoses Problem 1    Problem 1 Malnutrition     Etiology (related to) Medical Diagnosis     Neurological Other (comment)  memory problem, dementia with behavioral distrubance     Signs/Symptoms (evidenced by) Unintended Weight Change; Other (comment)  muscle and fat wasting     Unintended Weight Change Loss     Number of Pounds Lost 15     Weight loss time period 1 month                      Intervention Goal     Row Name 02/11/22 1503          Intervention Goal    General Meet nutritional needs for age/condition     PO Increase intake; Meet estimated needs; Tolerate PO     Weight Appropriate weight gain                Nutrition Intervention     Row Name 02/11/22 1504          Nutrition Intervention    RD/Tech Action Care plan reviewd; Follow Tx progress; Encourage intake                  Education/Evaluation     Row Name 02/11/22 1504          Education    Education Will Instruct as appropriate            Monitor/Evaluation    Monitor Per protocol; I&O; PO intake; Supplement intake; Pertinent labs; Weight; Skin status; Symptoms                 Electronically signed by:  Marcia Steele RD  02/11/22 15:04 EST

## 2022-02-12 LAB
GLUCOSE BLDC GLUCOMTR-MCNC: 120 MG/DL (ref 70–130)
GLUCOSE BLDC GLUCOMTR-MCNC: 131 MG/DL (ref 70–130)
GLUCOSE BLDC GLUCOMTR-MCNC: 146 MG/DL (ref 70–130)
MAGNESIUM SERPL-MCNC: 2.1 MG/DL (ref 1.6–2.4)

## 2022-02-12 PROCEDURE — 82962 GLUCOSE BLOOD TEST: CPT

## 2022-02-12 PROCEDURE — 25010000002 HALOPERIDOL LACTATE PER 5 MG: Performed by: SPECIALIST

## 2022-02-12 PROCEDURE — 83735 ASSAY OF MAGNESIUM: CPT | Performed by: HOSPITALIST

## 2022-02-12 RX ADMIN — POLYETHYLENE GLYCOL 3350 17 G: 17 POWDER, FOR SOLUTION ORAL at 10:03

## 2022-02-12 RX ADMIN — METOPROLOL SUCCINATE 50 MG: 50 TABLET, EXTENDED RELEASE ORAL at 10:03

## 2022-02-12 RX ADMIN — HALOPERIDOL LACTATE 5 MG: 5 INJECTION, SOLUTION INTRAMUSCULAR at 19:53

## 2022-02-12 RX ADMIN — HALOPERIDOL 2 MG: 2 TABLET ORAL at 17:01

## 2022-02-12 RX ADMIN — LISINOPRIL 7.5 MG: 5 TABLET ORAL at 10:03

## 2022-02-12 NOTE — PLAN OF CARE
Goal Outcome Evaluation:  Plan of Care Reviewed With: patient        Progress: no change  Outcome Summary: no acute changes overnight, pt restless at start of shift, but could be redirected easily. pt pleasantly confused. family supposed to come see pt to see if palliative care is something they would like to proceed with. will ctm

## 2022-02-12 NOTE — PROGRESS NOTES
"DAILY PROGRESS NOTE  [facility]    Patient Identification:  Name: Savannah Andres  Age: 86 y.o.  Sex: female  :  1935  MRN: [id number]         Primary Care Physician: Vasu Del Angel MD    Subjective:  Interval History:She is confused.  She is better today.  She is more cooperative and following direction.  No need for restraints.    Objective:    Scheduled Meds:haloperidol, 2 mg, Oral, Daily With Dinner  haloperidol, 5 mg, Oral, Nightly  insulin lispro, 0-7 Units, Subcutaneous, TID AC  lisinopril, 7.5 mg, Oral, Daily  metoprolol succinate XL, 50 mg, Oral, Daily  polyethylene glycol, 17 g, Oral, Daily  senna-docusate sodium, 2 tablet, Oral, Nightly      Continuous Infusions:     Vital signs in last 24 hours:  Temp:  [98.2 °F (36.8 °C)-98.4 °F (36.9 °C)] 98.2 °F (36.8 °C)  Heart Rate:  [72-88] 74  Resp:  [16-18] 18  BP: (106-142)/(56-72) 142/72    Intake/Output:    Intake/Output Summary (Last 24 hours) at 2022 1558  Last data filed at 2022 1349  Gross per 24 hour   Intake 390 ml   Output --   Net 390 ml       Exam:  /72 (BP Location: Left arm, Patient Position: Lying)   Pulse 74   Temp 98.2 °F (36.8 °C) (Oral)   Resp 18   Ht 157.5 cm (62.01\")   Wt 45.9 kg (101 lb 4.8 oz)   SpO2 96%   BMI 18.52 kg/m²     General Appearance:    confused, no distress   Head:    Normocephalic, without obvious abnormality, atraumatic   Eyes:       Throat:   Lips, tongue, gums normal   Neck:   Supple, symmetrical, trachea midline, no JVD   Lungs:     Clear to auscultation bilaterally, respirations unlabored   Chest Wall:    No tenderness or deformity    Heart:    Regular rate and rhythm, S1 and S2 normal, no murmur,no  Rub or gallop   Abdomen:     Soft, nontender, bowel sounds active, no masses, no organomegaly    Extremities:   Extremities normal, atraumatic, no cyanosis or edema   Pulses:      Skin:   Skin is warm and dry,  no rashes or palpable lesions   Neurologic:   no focal deficits " noted, confused.  Demented      Lab Results (last 72 hours)     Procedure Component Value Units Date/Time    POC Glucose Once [[id number]]  (Normal) Collected: 02/11/22 1118    Specimen: Blood Updated: 02/11/22 1130     Glucose 110 mg/dL      Comment: Meter: [id number] : [id number] Petrajanessa MAIER       Magnesium [[id number]]  (Normal) Collected: 02/11/22 0621    Specimen: Blood Updated: 02/11/22 0917     Magnesium 2.1 mg/dL     Basic Metabolic Panel [[id number]]  (Abnormal) Collected: 02/11/22 0621    Specimen: Blood Updated: 02/11/22 0746     Glucose 117 mg/dL      BUN 19 mg/dL      Creatinine 0.74 mg/dL      Sodium 141 mmol/L      Potassium 3.7 mmol/L      Chloride 106 mmol/L      CO2 27.3 mmol/L      Calcium 8.8 mg/dL      eGFR Non African Amer 74 mL/min/1.73      BUN/Creatinine Ratio 25.7     Anion Gap 7.7 mmol/L     Narrative:      GFR Normal >60  Chronic Kidney Disease <60  Kidney Failure <15      CBC (No Diff) [[id number]]  (Abnormal) Collected: 02/11/22 0621    Specimen: Blood Updated: 02/11/22 0718     WBC 6.35 10*3/mm3      RBC 3.99 10*6/mm3      Hemoglobin 11.8 g/dL      Hematocrit 35.3 %      MCV 88.5 fL      MCH 29.6 pg      MCHC 33.4 g/dL      RDW 12.7 %      RDW-SD 41.0 fl      MPV 10.5 fL      Platelets 209 10*3/mm3     POC Glucose Once [[id number]]  (Normal) Collected: 02/11/22 0610    Specimen: Blood Updated: 02/11/22 0612     Glucose 112 mg/dL      Comment: Meter: [id number] : [id number] [place] Donna CNA       POC Glucose Once [[id number]]  (Abnormal) Collected: 02/10/22 2038    Specimen: Blood Updated: 02/10/22 2039     Glucose 173 mg/dL      Comment: Meter: [id number] : [id number] [place] Donna CNA       POC Glucose Once [[id number]]  (Normal) Collected: 02/10/22 1051    Specimen: Blood Updated: 02/10/22 1053     Glucose 117 mg/dL      Comment: Meter: [id number] : [id number] Shilpi MAIER       Basic Metabolic Panel [[id number]]  (Abnormal) Collected: 02/10/22 0456    Specimen: Blood  Updated: 02/10/22 0639     Glucose 178 mg/dL      BUN 19 mg/dL      Creatinine 0.81 mg/dL      Sodium 140 mmol/L      Potassium 4.1 mmol/L      Chloride 102 mmol/L      CO2 22.0 mmol/L      Calcium 9.4 mg/dL      eGFR Non African Amer 67 mL/min/1.73      BUN/Creatinine Ratio 23.5     Anion Gap 16.0 mmol/L     Narrative:      GFR Normal >60  Chronic Kidney Disease <60  Kidney Failure <15      Magnesium [[id number]]  (Normal) Collected: 02/10/22 0456    Specimen: Blood Updated: 02/10/22 0639     Magnesium 2.0 mg/dL     POC Glucose Once [[id number]]  (Abnormal) Collected: 02/10/22 0616    Specimen: Blood Updated: 02/10/22 0618     Glucose 160 mg/dL      Comment: Meter: [id number] : [id number] Jax MAIER       CBC (No Diff) [[id number]]  (Normal) Collected: 02/10/22 0456    Specimen: Blood Updated: 02/10/22 0601     WBC 9.99 10*3/mm3      RBC 4.28 10*6/mm3      Hemoglobin 12.7 g/dL      Hematocrit 38.5 %      MCV 90.0 fL      MCH 29.7 pg      MCHC 33.0 g/dL      RDW 12.6 %      RDW-SD 41.0 fl      MPV 10.6 fL      Platelets 254 10*3/mm3     POC Glucose Once [[id number]]  (Normal) Collected: 02/09/22 2046    Specimen: Blood Updated: 02/09/22 2047     Glucose 129 mg/dL      Comment: Meter: [id number] : [id number] Jax MAIER       POC Glucose Once [[id number]]  (Abnormal) Collected: 02/09/22 1643    Specimen: Blood Updated: 02/09/22 1644     Glucose 168 mg/dL      Comment: Meter: [id number] : [id number] Davian MAIER       POC Glucose Once [[id number]]  (Abnormal) Collected: 02/09/22 1118    Specimen: Blood Updated: 02/09/22 1120     Glucose 162 mg/dL      Comment: Meter: [id number] : [id number] Davian MAIER       Comprehensive Metabolic Panel [[id number]]  (Abnormal) Collected: 02/09/22 0452    Specimen: Blood Updated: 02/09/22 0635     Glucose 132 mg/dL      BUN 23 mg/dL      Creatinine 0.85 mg/dL      Sodium 142 mmol/L      Potassium 3.9 mmol/L      Chloride 105 mmol/L      CO2 28.1  mmol/L      Calcium 9.1 mg/dL      Total Protein 6.1 g/dL      Albumin 2.90 g/dL      ALT (SGPT) 17 U/L      AST (SGOT) 20 U/L      Alkaline Phosphatase 74 U/L      Total Bilirubin 0.4 mg/dL      eGFR Non African Amer 63 mL/min/1.73      Globulin 3.2 gm/dL      A/G Ratio 0.9 g/dL      BUN/Creatinine Ratio 27.1     Anion Gap 8.9 mmol/L     Narrative:      GFR Normal >60  Chronic Kidney Disease <60  Kidney Failure <15      Magnesium [[id number]]  (Normal) Collected: 02/09/22 0452    Specimen: Blood Updated: 02/09/22 0635     Magnesium 2.3 mg/dL     POC Glucose Once [[id number]]  (Abnormal) Collected: 02/09/22 0632    Specimen: Blood Updated: 02/09/22 0634     Glucose 144 mg/dL      Comment: Meter: [id number] : [id number] [employer] LIVIER       CBC (No Diff) [[id number]]  (Abnormal) Collected: 02/09/22 0452    Specimen: Blood Updated: 02/09/22 0557     WBC 5.57 10*3/mm3      RBC 4.05 10*6/mm3      Hemoglobin 11.9 g/dL      Hematocrit 37.3 %      MCV 92.1 fL      MCH 29.4 pg      MCHC 31.9 g/dL      RDW 12.6 %      RDW-SD 43.3 fl      MPV 10.5 fL      Platelets 217 10*3/mm3     POC Glucose Once [[id number]]  (Normal) Collected: 02/08/22 2028    Specimen: Blood Updated: 02/08/22 2031     Glucose 99 mg/dL      Comment: Meter: [id number] : [id number] [employer] NA       POC Glucose Once [[id number]]  (Abnormal) Collected: 02/08/22 1551    Specimen: Blood Updated: 02/08/22 1552     Glucose 161 mg/dL      Comment: Meter: [id number] : [id number] Carlos A Nguyen CNA           Data Review:  Results from last 7 days   Lab Units 02/11/22  0621 02/10/22  0456 02/10/22  0456 02/09/22  0452 02/09/22  0452   SODIUM mmol/L 141  --  140  --  142   POTASSIUM mmol/L 3.7  --  4.1  --  3.9   CHLORIDE mmol/L 106  --  102  --  105   CO2 mmol/L 27.3  --  22.0  --  28.1   BUN mg/dL 19  --  19  --  23   CREATININE mg/dL 0.74  --  0.81  --  0.85   GLUCOSE mg/dL 117*   < > 178*   < > 132*   CALCIUM mg/dL 8.8  --  9.4  --  9.1    < > =  values in this interval not displayed.     Results from last 7 days   Lab Units 02/11/22  0621 02/10/22  0456 02/09/22  0452   WBC 10*3/mm3 6.35 9.99 5.57   HEMOGLOBIN g/dL 11.8* 12.7 11.9*   HEMATOCRIT % 35.3 38.5 37.3   PLATELETS 10*3/mm3 209 254 217     Results from last 7 days   Lab Units 02/06/22  0629   TSH uIU/mL 1.360     Results from last 7 days   Lab Units 02/06/22  0629   HEMOGLOBIN A1C % 7.79*     Lab Results   Lab Value Date/Time    TROPONINT 0.101 (C) 11/22/2016 1745         Results from last 7 days   Lab Units 02/09/22  0452 02/08/22  0353   ALK PHOS U/L 74 76   BILIRUBIN mg/dL 0.4 0.5   ALT (SGPT) U/L 17 17   AST (SGOT) U/L 20 26     Results from last 7 days   Lab Units 02/06/22  0629   TSH uIU/mL 1.360     Results from last 7 days   Lab Units 02/06/22  0629   HEMOGLOBIN A1C % 7.79*     Glucose   Date/Time Value Ref Range Status   02/12/2022 1103 146 (H) 70 - 130 mg/dL Final     Comment:     Meter: [id number] : [id number] Xiang Maldonado LIVIER   02/12/2022 0601 120 70 - 130 mg/dL Final     Comment:     Meter: [id number] : [id number] Vielka HERNANDESA   02/11/2022 2030 191 (H) 70 - 130 mg/dL Final     Comment:     Meter: [id number] : [id number] Vielka HERNANDESA   02/11/2022 1600 141 (H) 70 - 130 mg/dL Final     Comment:     Meter: [id number] : [id number] Xiang Maldonado LIVIER   02/11/2022 1118 110 70 - 130 mg/dL Final     Comment:     Meter: [id number] : [id number] Xiang Maldonado LIVIER   02/11/2022 0610 112 70 - 130 mg/dL Final     Comment:     Meter: [id number] : [id number] Vielka HERNANDESA   02/10/2022 2038 173 (H) 70 - 130 mg/dL Final     Comment:     Meter: [id number] : [id number] [place] Donnaarthur GARCIA   02/10/2022 1051 117 70 - 130 mg/dL Final     Comment:     Meter: [id number] : [id number] Shilpi MAIER           Past Medical History:   Diagnosis Date   • Bilateral leg edema    • 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   • CHF (congestive heart failure) (HCC)    • Hypertension    •  Loss of consciousness (HCC)    • Skin cyst    • SOB (shortness of breath)    • Symptomatic bradycardia    • Syncope        Assessment:  Active Hospital Problems    Diagnosis  POA   • **Dementia with behavioral disturbance (HCC) [F03.91]  Yes   • Constipation [K59.00]  Yes   • Hypernatremia [E87.0]  No   • Type 2 diabetes mellitus without complication, without long-term current use of insulin (HCC) [E11.9]  Yes   • Severe malnutrition (CMS/HCC) [E43]  Yes   • Cardiomyopathy (HCC) [I42.9]  Yes   • Pulmonary hypertension (HCC) [I27.20]  Yes   • Hypertension [I10]  Yes   • Hypokalemia [E87.6]  Yes   • CHF (congestive heart failure), NYHA class III (HCC) [I50.9]  Yes      Resolved Hospital Problems   No resolved problems to display.       Plan:  Continue with current RX. Psych consult appreciated. Follow lab.  Restraints not needed.  Follow-up lab.  She is improved and probably could go to a skilled nursing facility for rehab.    Iker Harrison MD  2/12/2022  15:58 EST

## 2022-02-12 NOTE — PROGRESS NOTES
"Patient is seen, evaluated, and chart reviewed. Discussed with staff.      Staff reports that patient has been cooperative and compliant with medications.  No behavioral issues.  She has been more redirectable.    On examination, patient is found laying in bed.  She is awake and alert.  She is oriented only to self.  She presents as being pleasantly confused.  Mood is \"okay.\"  Affect congruent.  No SI/HI/AVH.  Thought processes are somewhat disorganized.  Judgment and insight are impaired.    No medication side effects.  No further changes at this time.  "

## 2022-02-13 LAB
ANION GAP SERPL CALCULATED.3IONS-SCNC: 6 MMOL/L (ref 5–15)
BASOPHILS # BLD AUTO: 0.04 10*3/MM3 (ref 0–0.2)
BASOPHILS NFR BLD AUTO: 0.7 % (ref 0–1.5)
BUN SERPL-MCNC: 24 MG/DL (ref 8–23)
BUN/CREAT SERPL: 34.3 (ref 7–25)
CALCIUM SPEC-SCNC: 8.5 MG/DL (ref 8.6–10.5)
CHLORIDE SERPL-SCNC: 108 MMOL/L (ref 98–107)
CO2 SERPL-SCNC: 26 MMOL/L (ref 22–29)
CREAT SERPL-MCNC: 0.7 MG/DL (ref 0.57–1)
DEPRECATED RDW RBC AUTO: 41.2 FL (ref 37–54)
EOSINOPHIL # BLD AUTO: 0.11 10*3/MM3 (ref 0–0.4)
EOSINOPHIL NFR BLD AUTO: 1.9 % (ref 0.3–6.2)
ERYTHROCYTE [DISTWIDTH] IN BLOOD BY AUTOMATED COUNT: 12.6 % (ref 12.3–15.4)
GFR SERPL CREATININE-BSD FRML MDRD: 79 ML/MIN/1.73
GLUCOSE BLDC GLUCOMTR-MCNC: 103 MG/DL (ref 70–130)
GLUCOSE BLDC GLUCOMTR-MCNC: 119 MG/DL (ref 70–130)
GLUCOSE BLDC GLUCOMTR-MCNC: 168 MG/DL (ref 70–130)
GLUCOSE BLDC GLUCOMTR-MCNC: 192 MG/DL (ref 70–130)
GLUCOSE SERPL-MCNC: 132 MG/DL (ref 65–99)
HCT VFR BLD AUTO: 36.6 % (ref 34–46.6)
HGB BLD-MCNC: 12.1 G/DL (ref 12–15.9)
IMM GRANULOCYTES # BLD AUTO: 0.02 10*3/MM3 (ref 0–0.05)
IMM GRANULOCYTES NFR BLD AUTO: 0.3 % (ref 0–0.5)
LYMPHOCYTES # BLD AUTO: 1.51 10*3/MM3 (ref 0.7–3.1)
LYMPHOCYTES NFR BLD AUTO: 25.8 % (ref 19.6–45.3)
MAGNESIUM SERPL-MCNC: 2 MG/DL (ref 1.6–2.4)
MCH RBC QN AUTO: 30 PG (ref 26.6–33)
MCHC RBC AUTO-ENTMCNC: 33.1 G/DL (ref 31.5–35.7)
MCV RBC AUTO: 90.6 FL (ref 79–97)
MONOCYTES # BLD AUTO: 0.41 10*3/MM3 (ref 0.1–0.9)
MONOCYTES NFR BLD AUTO: 7 % (ref 5–12)
NEUTROPHILS NFR BLD AUTO: 3.77 10*3/MM3 (ref 1.7–7)
NEUTROPHILS NFR BLD AUTO: 64.3 % (ref 42.7–76)
NRBC BLD AUTO-RTO: 0 /100 WBC (ref 0–0.2)
PLATELET # BLD AUTO: 206 10*3/MM3 (ref 140–450)
PMV BLD AUTO: 10.2 FL (ref 6–12)
POTASSIUM SERPL-SCNC: 4.1 MMOL/L (ref 3.5–5.2)
RBC # BLD AUTO: 4.04 10*6/MM3 (ref 3.77–5.28)
SODIUM SERPL-SCNC: 140 MMOL/L (ref 136–145)
WBC NRBC COR # BLD: 5.86 10*3/MM3 (ref 3.4–10.8)

## 2022-02-13 PROCEDURE — 82962 GLUCOSE BLOOD TEST: CPT

## 2022-02-13 PROCEDURE — 83735 ASSAY OF MAGNESIUM: CPT | Performed by: HOSPITALIST

## 2022-02-13 PROCEDURE — 63710000001 INSULIN LISPRO (HUMAN) PER 5 UNITS: Performed by: INTERNAL MEDICINE

## 2022-02-13 PROCEDURE — 80048 BASIC METABOLIC PNL TOTAL CA: CPT | Performed by: HOSPITALIST

## 2022-02-13 PROCEDURE — 85025 COMPLETE CBC W/AUTO DIFF WBC: CPT | Performed by: HOSPITALIST

## 2022-02-13 RX ADMIN — HALOPERIDOL 5 MG: 5 TABLET ORAL at 20:08

## 2022-02-13 RX ADMIN — Medication 3 MG: at 22:10

## 2022-02-13 RX ADMIN — DOCUSATE SODIUM 50MG AND SENNOSIDES 8.6MG 2 TABLET: 8.6; 5 TABLET, FILM COATED ORAL at 20:08

## 2022-02-13 RX ADMIN — INSULIN LISPRO 2 UNITS: 100 INJECTION, SOLUTION INTRAVENOUS; SUBCUTANEOUS at 16:12

## 2022-02-13 RX ADMIN — HALOPERIDOL 2 MG: 2 TABLET ORAL at 17:27

## 2022-02-13 RX ADMIN — METOPROLOL SUCCINATE 50 MG: 50 TABLET, EXTENDED RELEASE ORAL at 10:23

## 2022-02-13 RX ADMIN — LISINOPRIL 7.5 MG: 5 TABLET ORAL at 10:23

## 2022-02-13 NOTE — PROGRESS NOTES
"DAILY PROGRESS NOTE  [facility]    Patient Identification:  Name: Savannah Andres  Age: 86 y.o.  Sex: female  :  1935  MRN: [id number]         Primary Care Physician: Vasu Del Angel MD    Subjective:  Interval History:She is confused.  She is better today.  She is more cooperative and following direction.  No need for restraints.    Objective:    Scheduled Meds:haloperidol, 2 mg, Oral, Daily With Dinner  haloperidol, 5 mg, Oral, Nightly  insulin lispro, 0-7 Units, Subcutaneous, TID AC  lisinopril, 7.5 mg, Oral, Daily  metoprolol succinate XL, 50 mg, Oral, Daily  polyethylene glycol, 17 g, Oral, Daily  senna-docusate sodium, 2 tablet, Oral, Nightly      Continuous Infusions:     Vital signs in last 24 hours:  Temp:  [97.7 °F (36.5 °C)-97.9 °F (36.6 °C)] 97.9 °F (36.6 °C)  Heart Rate:  [60-75] 62  Resp:  [16] 16  BP: (124-147)/(66-80) 124/68    Intake/Output:    Intake/Output Summary (Last 24 hours) at 2022 1340  Last data filed at 2022 1015  Gross per 24 hour   Intake 360 ml   Output --   Net 360 ml       Exam:  /68 (BP Location: Left arm, Patient Position: Sitting)   Pulse 62   Temp 97.9 °F (36.6 °C) (Oral)   Resp 16   Ht 157.5 cm (62.01\")   Wt 38 kg (83 lb 12.4 oz)   SpO2 92%   BMI 15.32 kg/m²     General Appearance:    confused, no distress   Head:    Normocephalic, without obvious abnormality, atraumatic   Eyes:       Throat:   Lips, tongue, gums normal   Neck:   Supple, symmetrical, trachea midline, no JVD   Lungs:     Clear to auscultation bilaterally, respirations unlabored   Chest Wall:    No tenderness or deformity    Heart:    Regular rate and rhythm, S1 and S2 normal, no murmur,no  Rub or gallop   Abdomen:     Soft, nontender, bowel sounds active, no masses, no organomegaly    Extremities:   Extremities normal, atraumatic, no cyanosis or edema   Pulses:      Skin:   Skin is warm and dry,  no rashes or palpable lesions   Neurologic:   no focal deficits noted, " confused.  Demented      Lab Results (last 72 hours)     Procedure Component Value Units Date/Time    POC Glucose Once [[id number]]  (Normal) Collected: 02/11/22 1118    Specimen: Blood Updated: 02/11/22 1130     Glucose 110 mg/dL      Comment: Meter: [id number] : [id number] Xiang MAIER       Magnesium [[id number]]  (Normal) Collected: 02/11/22 0621    Specimen: Blood Updated: 02/11/22 0917     Magnesium 2.1 mg/dL     Basic Metabolic Panel [[id number]]  (Abnormal) Collected: 02/11/22 0621    Specimen: Blood Updated: 02/11/22 0746     Glucose 117 mg/dL      BUN 19 mg/dL      Creatinine 0.74 mg/dL      Sodium 141 mmol/L      Potassium 3.7 mmol/L      Chloride 106 mmol/L      CO2 27.3 mmol/L      Calcium 8.8 mg/dL      eGFR Non African Amer 74 mL/min/1.73      BUN/Creatinine Ratio 25.7     Anion Gap 7.7 mmol/L     Narrative:      GFR Normal >60  Chronic Kidney Disease <60  Kidney Failure <15      CBC (No Diff) [[id number]]  (Abnormal) Collected: 02/11/22 0621    Specimen: Blood Updated: 02/11/22 0718     WBC 6.35 10*3/mm3      RBC 3.99 10*6/mm3      Hemoglobin 11.8 g/dL      Hematocrit 35.3 %      MCV 88.5 fL      MCH 29.6 pg      MCHC 33.4 g/dL      RDW 12.7 %      RDW-SD 41.0 fl      MPV 10.5 fL      Platelets 209 10*3/mm3     POC Glucose Once [[id number]]  (Normal) Collected: 02/11/22 0610    Specimen: Blood Updated: 02/11/22 0612     Glucose 112 mg/dL      Comment: Meter: [id number] : [id number] [place] Donna CNA       POC Glucose Once [[id number]]  (Abnormal) Collected: 02/10/22 2038    Specimen: Blood Updated: 02/10/22 2039     Glucose 173 mg/dL      Comment: Meter: [id number] : [id number] [place] Donna CNA       POC Glucose Once [[id number]]  (Normal) Collected: 02/10/22 1051    Specimen: Blood Updated: 02/10/22 1053     Glucose 117 mg/dL      Comment: Meter: [id number] : [id number] Shilpi MAIER       Basic Metabolic Panel [[id number]]  (Abnormal) Collected: 02/10/22 0456    Specimen: Blood Updated:  02/10/22 0639     Glucose 178 mg/dL      BUN 19 mg/dL      Creatinine 0.81 mg/dL      Sodium 140 mmol/L      Potassium 4.1 mmol/L      Chloride 102 mmol/L      CO2 22.0 mmol/L      Calcium 9.4 mg/dL      eGFR Non African Amer 67 mL/min/1.73      BUN/Creatinine Ratio 23.5     Anion Gap 16.0 mmol/L     Narrative:      GFR Normal >60  Chronic Kidney Disease <60  Kidney Failure <15      Magnesium [[id number]]  (Normal) Collected: 02/10/22 0456    Specimen: Blood Updated: 02/10/22 0639     Magnesium 2.0 mg/dL     POC Glucose Once [[id number]]  (Abnormal) Collected: 02/10/22 0616    Specimen: Blood Updated: 02/10/22 0618     Glucose 160 mg/dL      Comment: Meter: [id number] : [id number] Jax MAIER       CBC (No Diff) [[id number]]  (Normal) Collected: 02/10/22 0456    Specimen: Blood Updated: 02/10/22 0601     WBC 9.99 10*3/mm3      RBC 4.28 10*6/mm3      Hemoglobin 12.7 g/dL      Hematocrit 38.5 %      MCV 90.0 fL      MCH 29.7 pg      MCHC 33.0 g/dL      RDW 12.6 %      RDW-SD 41.0 fl      MPV 10.6 fL      Platelets 254 10*3/mm3     POC Glucose Once [[id number]]  (Normal) Collected: 02/09/22 2046    Specimen: Blood Updated: 02/09/22 2047     Glucose 129 mg/dL      Comment: Meter: [id number] : [id number] Jax MAIER       POC Glucose Once [[id number]]  (Abnormal) Collected: 02/09/22 1643    Specimen: Blood Updated: 02/09/22 1644     Glucose 168 mg/dL      Comment: Meter: [id number] : [id number] Davian MAIER       POC Glucose Once [[id number]]  (Abnormal) Collected: 02/09/22 1118    Specimen: Blood Updated: 02/09/22 1120     Glucose 162 mg/dL      Comment: Meter: [id number] : [id number] Davian MAIER       Comprehensive Metabolic Panel [[id number]]  (Abnormal) Collected: 02/09/22 0452    Specimen: Blood Updated: 02/09/22 0635     Glucose 132 mg/dL      BUN 23 mg/dL      Creatinine 0.85 mg/dL      Sodium 142 mmol/L      Potassium 3.9 mmol/L      Chloride 105 mmol/L      CO2 28.1 mmol/L       Calcium 9.1 mg/dL      Total Protein 6.1 g/dL      Albumin 2.90 g/dL      ALT (SGPT) 17 U/L      AST (SGOT) 20 U/L      Alkaline Phosphatase 74 U/L      Total Bilirubin 0.4 mg/dL      eGFR Non African Amer 63 mL/min/1.73      Globulin 3.2 gm/dL      A/G Ratio 0.9 g/dL      BUN/Creatinine Ratio 27.1     Anion Gap 8.9 mmol/L     Narrative:      GFR Normal >60  Chronic Kidney Disease <60  Kidney Failure <15      Magnesium [[id number]]  (Normal) Collected: 02/09/22 0452    Specimen: Blood Updated: 02/09/22 0635     Magnesium 2.3 mg/dL     POC Glucose Once [[id number]]  (Abnormal) Collected: 02/09/22 0632    Specimen: Blood Updated: 02/09/22 0634     Glucose 144 mg/dL      Comment: Meter: [id number] : [id number] muzu tv LIVIER       CBC (No Diff) [[id number]]  (Abnormal) Collected: 02/09/22 0452    Specimen: Blood Updated: 02/09/22 0557     WBC 5.57 10*3/mm3      RBC 4.05 10*6/mm3      Hemoglobin 11.9 g/dL      Hematocrit 37.3 %      MCV 92.1 fL      MCH 29.4 pg      MCHC 31.9 g/dL      RDW 12.6 %      RDW-SD 43.3 fl      MPV 10.5 fL      Platelets 217 10*3/mm3     POC Glucose Once [[id number]]  (Normal) Collected: 02/08/22 2028    Specimen: Blood Updated: 02/08/22 2031     Glucose 99 mg/dL      Comment: Meter: [id number] : [id number] muzu tv NA       POC Glucose Once [[id number]]  (Abnormal) Collected: 02/08/22 1551    Specimen: Blood Updated: 02/08/22 1552     Glucose 161 mg/dL      Comment: Meter: [id number] : [id number] Carlos A Nguyen CNA           Data Review:  Results from last 7 days   Lab Units 02/13/22  0855 02/11/22  0621 02/11/22 0621 02/10/22  0456 02/10/22  0456   SODIUM mmol/L 140  --  141  --  140   POTASSIUM mmol/L 4.1  --  3.7  --  4.1   CHLORIDE mmol/L 108*  --  106  --  102   CO2 mmol/L 26.0  --  27.3  --  22.0   BUN mg/dL 24*  --  19  --  19   CREATININE mg/dL 0.70  --  0.74  --  0.81   GLUCOSE mg/dL 132*   < > 117*   < > 178*   CALCIUM mg/dL 8.5*  --  8.8  --  9.4    < > = values in  this interval not displayed.     Results from last 7 days   Lab Units 02/13/22  0855 02/11/22  0621 02/10/22  0456   WBC 10*3/mm3 5.86 6.35 9.99   HEMOGLOBIN g/dL 12.1 11.8* 12.7   HEMATOCRIT % 36.6 35.3 38.5   PLATELETS 10*3/mm3 206 209 254             Lab Results   Lab Value Date/Time    TROPONINT 0.101 (C) 11/22/2016 1745         Results from last 7 days   Lab Units 02/09/22  0452 02/08/22  0353   ALK PHOS U/L 74 76   BILIRUBIN mg/dL 0.4 0.5   ALT (SGPT) U/L 17 17   AST (SGOT) U/L 20 26             Glucose   Date/Time Value Ref Range Status   02/13/2022 1123 168 (H) 70 - 130 mg/dL Final     Comment:     Meter: [id number] : [id number] Tomas Manjarrez RN   02/13/2022 0642 119 70 - 130 mg/dL Final     Comment:     Meter: [id number] : [id number] Nidia Lee RN   02/12/2022 1632 131 (H) 70 - 130 mg/dL Final     Comment:     Meter: [id number] : [id number] Isauro MAIER   02/12/2022 1103 146 (H) 70 - 130 mg/dL Final     Comment:     Meter: [id number] : [id number] Xiang MAIER   02/12/2022 0601 120 70 - 130 mg/dL Final     Comment:     Meter: [id number] : [id number] Vielka Thompson CNA   02/11/2022 2030 191 (H) 70 - 130 mg/dL Final     Comment:     Meter: [id number] : [id number] Vielka Thompson CNA   02/11/2022 1600 141 (H) 70 - 130 mg/dL Final     Comment:     Meter: [id number] : [id number] Xiang MAIER   02/11/2022 1118 110 70 - 130 mg/dL Final     Comment:     Meter: [id number] : [id number] Xiang MAIER           Past Medical History:   Diagnosis Date   • Bilateral leg edema    • 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   • CHF (congestive heart failure) (HCC)    • Hypertension    • Loss of consciousness (HCC)    • Skin cyst    • SOB (shortness of breath)    • Symptomatic bradycardia    • Syncope        Assessment:  Active Hospital Problems    Diagnosis  POA   • **Dementia with behavioral disturbance (HCC) [F03.91]  Yes   • Constipation [K59.00]  Yes   • Hypernatremia [E87.0]   No   • Type 2 diabetes mellitus without complication, without long-term current use of insulin (HCC) [E11.9]  Yes   • Severe malnutrition (CMS/HCC) [E43]  Yes   • Cardiomyopathy (HCC) [I42.9]  Yes   • Pulmonary hypertension (HCC) [I27.20]  Yes   • Hypertension [I10]  Yes   • Hypokalemia [E87.6]  Yes   • CHF (congestive heart failure), NYHA class III (HCC) [I50.9]  Yes      Resolved Hospital Problems   No resolved problems to display.       Plan:  Continue with current RX. Psych consult appreciated. Follow lab.  Restraints not needed.  Follow-up lab.  She is improved and probably could go to a skilled nursing facility for rehab.    Iker Harrison MD  2/13/2022  13:40 EST

## 2022-02-13 NOTE — NURSING NOTE
Pt extremely combative at start of shift, constantly getting out of bed. Pt attempted to hit and punch at staff, refused to follow directions and take medication. PRN IM haldol given.    0407- Pt became more agreeable, able to be redirected and listen to commands

## 2022-02-13 NOTE — PLAN OF CARE
Problem: Adult Inpatient Plan of Care  Goal: Plan of Care Review  Outcome: Ongoing, Progressing  Flowsheets  Taken 2/13/2022 1756 by Zoila Francisco, RN  Progress: no change  Outcome Summary: Rested most of day, mostly pleasant and cooperative. No need for PRN meds so far this shift. Restless when awake, up to BSC w/assist x1. Incontinence care PRN. Turns self in bed. Moved to room 409 for safety as patient can be impulsive trying to get out of bed. Will closely monitor.  Taken 2/12/2022 0524 by Rosa Jacob, RN  Plan of Care Reviewed With: patient   Goal Outcome Evaluation:           Progress: no change  Outcome Summary: Rested most of day, mostly pleasant and cooperative. No need for PRN meds so far this shift. Restless when awake, up to BSC w/assist x1. Incontinence care PRN. Turns self in bed. Moved to room 409 for safety as patient can be impulsive trying to get out of bed. Will closely monitor.

## 2022-02-14 LAB
ANION GAP SERPL CALCULATED.3IONS-SCNC: 6 MMOL/L (ref 5–15)
BASOPHILS # BLD AUTO: 0.05 10*3/MM3 (ref 0–0.2)
BASOPHILS NFR BLD AUTO: 0.8 % (ref 0–1.5)
BUN SERPL-MCNC: 18 MG/DL (ref 8–23)
BUN/CREAT SERPL: 25.4 (ref 7–25)
CALCIUM SPEC-SCNC: 8.7 MG/DL (ref 8.6–10.5)
CHLORIDE SERPL-SCNC: 105 MMOL/L (ref 98–107)
CO2 SERPL-SCNC: 27 MMOL/L (ref 22–29)
CREAT SERPL-MCNC: 0.71 MG/DL (ref 0.57–1)
DEPRECATED RDW RBC AUTO: 40.7 FL (ref 37–54)
EOSINOPHIL # BLD AUTO: 0.14 10*3/MM3 (ref 0–0.4)
EOSINOPHIL NFR BLD AUTO: 2.2 % (ref 0.3–6.2)
ERYTHROCYTE [DISTWIDTH] IN BLOOD BY AUTOMATED COUNT: 12.4 % (ref 12.3–15.4)
GFR SERPL CREATININE-BSD FRML MDRD: 78 ML/MIN/1.73
GLUCOSE BLDC GLUCOMTR-MCNC: 118 MG/DL (ref 70–130)
GLUCOSE BLDC GLUCOMTR-MCNC: 125 MG/DL (ref 70–130)
GLUCOSE BLDC GLUCOMTR-MCNC: 143 MG/DL (ref 70–130)
GLUCOSE BLDC GLUCOMTR-MCNC: 160 MG/DL (ref 70–130)
GLUCOSE BLDC GLUCOMTR-MCNC: 96 MG/DL (ref 70–130)
GLUCOSE SERPL-MCNC: 133 MG/DL (ref 65–99)
HCT VFR BLD AUTO: 34.1 % (ref 34–46.6)
HGB BLD-MCNC: 11 G/DL (ref 12–15.9)
IMM GRANULOCYTES # BLD AUTO: 0.01 10*3/MM3 (ref 0–0.05)
IMM GRANULOCYTES NFR BLD AUTO: 0.2 % (ref 0–0.5)
LYMPHOCYTES # BLD AUTO: 1.65 10*3/MM3 (ref 0.7–3.1)
LYMPHOCYTES NFR BLD AUTO: 25.7 % (ref 19.6–45.3)
MAGNESIUM SERPL-MCNC: 2 MG/DL (ref 1.6–2.4)
MCH RBC QN AUTO: 28.9 PG (ref 26.6–33)
MCHC RBC AUTO-ENTMCNC: 32.3 G/DL (ref 31.5–35.7)
MCV RBC AUTO: 89.7 FL (ref 79–97)
MONOCYTES # BLD AUTO: 0.53 10*3/MM3 (ref 0.1–0.9)
MONOCYTES NFR BLD AUTO: 8.3 % (ref 5–12)
NEUTROPHILS NFR BLD AUTO: 4.03 10*3/MM3 (ref 1.7–7)
NEUTROPHILS NFR BLD AUTO: 62.8 % (ref 42.7–76)
NRBC BLD AUTO-RTO: 0 /100 WBC (ref 0–0.2)
PLATELET # BLD AUTO: 197 10*3/MM3 (ref 140–450)
PMV BLD AUTO: 10 FL (ref 6–12)
POTASSIUM SERPL-SCNC: 3.9 MMOL/L (ref 3.5–5.2)
RBC # BLD AUTO: 3.8 10*6/MM3 (ref 3.77–5.28)
SODIUM SERPL-SCNC: 138 MMOL/L (ref 136–145)
WBC NRBC COR # BLD: 6.41 10*3/MM3 (ref 3.4–10.8)

## 2022-02-14 PROCEDURE — 97530 THERAPEUTIC ACTIVITIES: CPT

## 2022-02-14 PROCEDURE — 97110 THERAPEUTIC EXERCISES: CPT

## 2022-02-14 PROCEDURE — 83735 ASSAY OF MAGNESIUM: CPT | Performed by: HOSPITALIST

## 2022-02-14 PROCEDURE — 82962 GLUCOSE BLOOD TEST: CPT

## 2022-02-14 PROCEDURE — 63710000001 INSULIN LISPRO (HUMAN) PER 5 UNITS: Performed by: INTERNAL MEDICINE

## 2022-02-14 PROCEDURE — 80048 BASIC METABOLIC PNL TOTAL CA: CPT | Performed by: HOSPITALIST

## 2022-02-14 PROCEDURE — 85025 COMPLETE CBC W/AUTO DIFF WBC: CPT | Performed by: HOSPITALIST

## 2022-02-14 RX ADMIN — HALOPERIDOL 5 MG: 5 TABLET ORAL at 20:56

## 2022-02-14 RX ADMIN — POLYETHYLENE GLYCOL 3350 17 G: 17 POWDER, FOR SOLUTION ORAL at 09:52

## 2022-02-14 RX ADMIN — HALOPERIDOL 2 MG: 2 TABLET ORAL at 18:23

## 2022-02-14 RX ADMIN — LISINOPRIL 7.5 MG: 5 TABLET ORAL at 09:52

## 2022-02-14 RX ADMIN — INSULIN LISPRO 2 UNITS: 100 INJECTION, SOLUTION INTRAVENOUS; SUBCUTANEOUS at 13:30

## 2022-02-14 RX ADMIN — DOCUSATE SODIUM 50MG AND SENNOSIDES 8.6MG 2 TABLET: 8.6; 5 TABLET, FILM COATED ORAL at 20:56

## 2022-02-14 RX ADMIN — METOPROLOL SUCCINATE 50 MG: 50 TABLET, EXTENDED RELEASE ORAL at 09:52

## 2022-02-14 NOTE — PLAN OF CARE
Goal Outcome Evaluation:  Plan of Care Reviewed With: patient           Outcome Summary: Pt seen for PT session this afternoon. Pt increased ambulation distance to 150' x2 with hha. Demo good pace, but small stride and narrow base of support. Remains confused. PT will continue to follow and progress/

## 2022-02-14 NOTE — PROGRESS NOTES
The patient seems markedly improved.  While still a bit cantankerous, she has been pleasant and cooperative with staff and is out of any restraint devices.  She continues to exhibit occasional impulsive behavior, attempting to get out of bed at times.  Would recommend continuation of currently prescribed Haldol for now

## 2022-02-14 NOTE — PROGRESS NOTES
"DAILY PROGRESS NOTE  [facility]    Patient Identification:  Name: Savannah Andres  Age: 86 y.o.  Sex: female  :  1935  MRN: [id number]         Primary Care Physician: Vasu Del Angel MD    Subjective:  Interval History:She is confused.  She is better today.  She is more cooperative and following direction.  No need for restraints.    Objective:    Scheduled Meds:haloperidol, 2 mg, Oral, Daily With Dinner  haloperidol, 5 mg, Oral, Nightly  insulin lispro, 0-7 Units, Subcutaneous, TID AC  lisinopril, 7.5 mg, Oral, Daily  metoprolol succinate XL, 50 mg, Oral, Daily  polyethylene glycol, 17 g, Oral, Daily  senna-docusate sodium, 2 tablet, Oral, Nightly      Continuous Infusions:     Vital signs in last 24 hours:  Temp:  [96.4 °F (35.8 °C)-98.3 °F (36.8 °C)] 96.4 °F (35.8 °C)  Heart Rate:  [62-89] 82  Resp:  [16] 16  BP: (116-155)/(58-88) 155/88    Intake/Output:  No intake or output data in the 24 hours ending 22 1231    Exam:  /88 (BP Location: Right arm, Patient Position: Lying)   Pulse 82   Temp 96.4 °F (35.8 °C) (Oral)   Resp 16   Ht 157.5 cm (62.01\")   Wt 39 kg (85 lb 14.4 oz)   SpO2 95%   BMI 15.71 kg/m²     General Appearance:    confused, no distress   Head:    Normocephalic, without obvious abnormality, atraumatic   Eyes:       Throat:   Lips, tongue, gums normal   Neck:   Supple, symmetrical, trachea midline, no JVD   Lungs:     Clear to auscultation bilaterally, respirations unlabored   Chest Wall:    No tenderness or deformity    Heart:    Regular rate and rhythm, S1 and S2 normal, no murmur,no  Rub or gallop   Abdomen:     Soft, nontender, bowel sounds active, no masses, no organomegaly    Extremities:   Extremities normal, atraumatic, no cyanosis or edema   Pulses:      Skin:   Skin is warm and dry,  no rashes or palpable lesions   Neurologic:   no focal deficits noted, confused.  Demented      Lab Results (last 72 hours)     Procedure Component Value Units " Date/Time    POC Glucose Once [[id number]]  (Normal) Collected: 02/11/22 1118    Specimen: Blood Updated: 02/11/22 1130     Glucose 110 mg/dL      Comment: Meter: [id number] : [id number] Xiang MAIER       Magnesium [[id number]]  (Normal) Collected: 02/11/22 0621    Specimen: Blood Updated: 02/11/22 0917     Magnesium 2.1 mg/dL     Basic Metabolic Panel [[id number]]  (Abnormal) Collected: 02/11/22 0621    Specimen: Blood Updated: 02/11/22 0746     Glucose 117 mg/dL      BUN 19 mg/dL      Creatinine 0.74 mg/dL      Sodium 141 mmol/L      Potassium 3.7 mmol/L      Chloride 106 mmol/L      CO2 27.3 mmol/L      Calcium 8.8 mg/dL      eGFR Non African Amer 74 mL/min/1.73      BUN/Creatinine Ratio 25.7     Anion Gap 7.7 mmol/L     Narrative:      GFR Normal >60  Chronic Kidney Disease <60  Kidney Failure <15      CBC (No Diff) [[id number]]  (Abnormal) Collected: 02/11/22 0621    Specimen: Blood Updated: 02/11/22 0718     WBC 6.35 10*3/mm3      RBC 3.99 10*6/mm3      Hemoglobin 11.8 g/dL      Hematocrit 35.3 %      MCV 88.5 fL      MCH 29.6 pg      MCHC 33.4 g/dL      RDW 12.7 %      RDW-SD 41.0 fl      MPV 10.5 fL      Platelets 209 10*3/mm3     POC Glucose Once [[id number]]  (Normal) Collected: 02/11/22 0610    Specimen: Blood Updated: 02/11/22 0612     Glucose 112 mg/dL      Comment: Meter: [id number] : [id number] Selfridge Donna CNA       POC Glucose Once [[id number]]  (Abnormal) Collected: 02/10/22 2038    Specimen: Blood Updated: 02/10/22 2039     Glucose 173 mg/dL      Comment: Meter: [id number] : [id number] Selfridge Donna CNA       POC Glucose Once [[id number]]  (Normal) Collected: 02/10/22 1051    Specimen: Blood Updated: 02/10/22 1053     Glucose 117 mg/dL      Comment: Meter: [id number] : [id number] Shilpi MAIER       Basic Metabolic Panel [[id number]]  (Abnormal) Collected: 02/10/22 0456    Specimen: Blood Updated: 02/10/22 0639     Glucose 178 mg/dL      BUN 19 mg/dL      Creatinine 0.81 mg/dL      Sodium  140 mmol/L      Potassium 4.1 mmol/L      Chloride 102 mmol/L      CO2 22.0 mmol/L      Calcium 9.4 mg/dL      eGFR Non African Amer 67 mL/min/1.73      BUN/Creatinine Ratio 23.5     Anion Gap 16.0 mmol/L     Narrative:      GFR Normal >60  Chronic Kidney Disease <60  Kidney Failure <15      Magnesium [[id number]]  (Normal) Collected: 02/10/22 0456    Specimen: Blood Updated: 02/10/22 0639     Magnesium 2.0 mg/dL     POC Glucose Once [[id number]]  (Abnormal) Collected: 02/10/22 0616    Specimen: Blood Updated: 02/10/22 0618     Glucose 160 mg/dL      Comment: Meter: [id number] : [id number] Jax MAIER       CBC (No Diff) [[id number]]  (Normal) Collected: 02/10/22 0456    Specimen: Blood Updated: 02/10/22 0601     WBC 9.99 10*3/mm3      RBC 4.28 10*6/mm3      Hemoglobin 12.7 g/dL      Hematocrit 38.5 %      MCV 90.0 fL      MCH 29.7 pg      MCHC 33.0 g/dL      RDW 12.6 %      RDW-SD 41.0 fl      MPV 10.6 fL      Platelets 254 10*3/mm3     POC Glucose Once [[id number]]  (Normal) Collected: 02/09/22 2046    Specimen: Blood Updated: 02/09/22 2047     Glucose 129 mg/dL      Comment: Meter: [id number] : [id number] Jax MAIER       POC Glucose Once [[id number]]  (Abnormal) Collected: 02/09/22 1643    Specimen: Blood Updated: 02/09/22 1644     Glucose 168 mg/dL      Comment: Meter: [id number] : [id number] Davian MAIER       POC Glucose Once [[id number]]  (Abnormal) Collected: 02/09/22 1118    Specimen: Blood Updated: 02/09/22 1120     Glucose 162 mg/dL      Comment: Meter: [id number] : [id number] Davian Alexia NA       Comprehensive Metabolic Panel [[id number]]  (Abnormal) Collected: 02/09/22 0452    Specimen: Blood Updated: 02/09/22 0635     Glucose 132 mg/dL      BUN 23 mg/dL      Creatinine 0.85 mg/dL      Sodium 142 mmol/L      Potassium 3.9 mmol/L      Chloride 105 mmol/L      CO2 28.1 mmol/L      Calcium 9.1 mg/dL      Total Protein 6.1 g/dL      Albumin 2.90 g/dL      ALT (SGPT) 17 U/L       AST (SGOT) 20 U/L      Alkaline Phosphatase 74 U/L      Total Bilirubin 0.4 mg/dL      eGFR Non African Amer 63 mL/min/1.73      Globulin 3.2 gm/dL      A/G Ratio 0.9 g/dL      BUN/Creatinine Ratio 27.1     Anion Gap 8.9 mmol/L     Narrative:      GFR Normal >60  Chronic Kidney Disease <60  Kidney Failure <15      Magnesium [[id number]]  (Normal) Collected: 02/09/22 0452    Specimen: Blood Updated: 02/09/22 0635     Magnesium 2.3 mg/dL     POC Glucose Once [[id number]]  (Abnormal) Collected: 02/09/22 0632    Specimen: Blood Updated: 02/09/22 0634     Glucose 144 mg/dL      Comment: Meter: [id number] : [id number] [employer] LIVIER       CBC (No Diff) [[id number]]  (Abnormal) Collected: 02/09/22 0452    Specimen: Blood Updated: 02/09/22 0557     WBC 5.57 10*3/mm3      RBC 4.05 10*6/mm3      Hemoglobin 11.9 g/dL      Hematocrit 37.3 %      MCV 92.1 fL      MCH 29.4 pg      MCHC 31.9 g/dL      RDW 12.6 %      RDW-SD 43.3 fl      MPV 10.5 fL      Platelets 217 10*3/mm3     POC Glucose Once [[id number]]  (Normal) Collected: 02/08/22 2028    Specimen: Blood Updated: 02/08/22 2031     Glucose 99 mg/dL      Comment: Meter: [id number] : [id number] [employer] NA       POC Glucose Once [[id number]]  (Abnormal) Collected: 02/08/22 1551    Specimen: Blood Updated: 02/08/22 1552     Glucose 161 mg/dL      Comment: Meter: [id number] : [id number] Carlos A Nguyen CNA           Data Review:  Results from last 7 days   Lab Units 02/14/22  0341 02/13/22  0855 02/13/22  0855 02/11/22  0621 02/11/22 0621   SODIUM mmol/L 138  --  140  --  141   POTASSIUM mmol/L 3.9  --  4.1  --  3.7   CHLORIDE mmol/L 105  --  108*  --  106   CO2 mmol/L 27.0  --  26.0  --  27.3   BUN mg/dL 18  --  24*  --  19   CREATININE mg/dL 0.71  --  0.70  --  0.74   GLUCOSE mg/dL 133*   < > 132*   < > 117*   CALCIUM mg/dL 8.7  --  8.5*  --  8.8    < > = values in this interval not displayed.     Results from last 7 days   Lab Units 02/14/22  8016  02/13/22  0855 02/11/22  0621   WBC 10*3/mm3 6.41 5.86 6.35   HEMOGLOBIN g/dL 11.0* 12.1 11.8*   HEMATOCRIT % 34.1 36.6 35.3   PLATELETS 10*3/mm3 197 206 209             Lab Results   Lab Value Date/Time    TROPONINT 0.101 (C) 11/22/2016 1745         Results from last 7 days   Lab Units 02/09/22  0452 02/08/22  0353   ALK PHOS U/L 74 76   BILIRUBIN mg/dL 0.4 0.5   ALT (SGPT) U/L 17 17   AST (SGOT) U/L 20 26             Glucose   Date/Time Value Ref Range Status   02/14/2022 1141 125 70 - 130 mg/dL Final     Comment:     Meter: [id number] : [id number] Shilpi Ortiz NA   02/14/2022 0632 143 (H) 70 - 130 mg/dL Final     Comment:     Meter: [id number] : [id number] Silva Jayashree PCA   02/13/2022 2043 103 70 - 130 mg/dL Final     Comment:     Meter: [id number] : [id number] Silva Jayashree PCA   02/13/2022 1605 192 (H) 70 - 130 mg/dL Final     Comment:     Meter: [id number] : [id number] Vielka Thompson CNA   02/13/2022 1123 168 (H) 70 - 130 mg/dL Final     Comment:     Meter: [id number] : [id number] Tomas Manjarrez RN   02/13/2022 0642 119 70 - 130 mg/dL Final     Comment:     Meter: [id number] : [id number] Ndiia Lee RN   02/12/2022 1632 131 (H) 70 - 130 mg/dL Final     Comment:     Meter: [id number] : [id number] Isauro Arredondo NA   02/12/2022 1103 146 (H) 70 - 130 mg/dL Final     Comment:     Meter: [id number] : [id number] Xiang MAIER           Past Medical History:   Diagnosis Date   • Bilateral leg edema    • 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   • CHF (congestive heart failure) (HCC)    • Hypertension    • Loss of consciousness (HCC)    • Skin cyst    • SOB (shortness of breath)    • Symptomatic bradycardia    • Syncope        Assessment:  Active Hospital Problems    Diagnosis  POA   • **Dementia with behavioral disturbance (HCC) [F03.91]  Yes   • Constipation [K59.00]  Yes   • Hypernatremia [E87.0]  No   • Type 2 diabetes mellitus without complication, without long-term current  use of insulin (HCC) [E11.9]  Yes   • Severe malnutrition (CMS/HCC) [E43]  Yes   • Cardiomyopathy (HCC) [I42.9]  Yes   • Pulmonary hypertension (HCC) [I27.20]  Yes   • Hypertension [I10]  Yes   • Hypokalemia [E87.6]  Yes   • CHF (congestive heart failure), NYHA class III (HCC) [I50.9]  Yes      Resolved Hospital Problems   No resolved problems to display.       Plan:  Continue with current RX. Psych consult appreciated. Follow lab.  Restraints not needed.  Follow-up lab.  She is improved and probably could go to a skilled nursing facility for rehab.  Probably not functioning well enough to go back to her assisted living facility.  She is doing better over the past couple of days and I am not sure were ready for palliative care yet either.  Probably needs skilled nursing facility for a while until we see how she does.    Iker Harrison MD  2/14/2022  12:31 EST

## 2022-02-14 NOTE — THERAPY TREATMENT NOTE
Patient Name: Savannah Andres  : 1935    MRN: [id number]                              Today's Date: 2022       Admit Date: 2/3/2022    Visit Dx:     ICD-10-CM ICD-9-CM   1. Dementia with behavioral disturbance, unspecified dementia type (HCC)  F03.91 294.21   2. Diarrhea, unspecified type  R19.7 787.91   3. Hypokalemia  E87.6 276.8     Patient Active Problem List   Diagnosis   • Third degree heart block (HCC)   • CHF (congestive heart failure), NYHA class III (HCC)   • Hyperglycemia   • Hypokalemia   • Acute midline low back pain without sciatica   • Inflamed sebaceous cyst   • Hypertension   • Presence of permanent cardiac pacemaker   • Valvular heart disease   • Pulmonary hypertension (HCC)   • Hordeolum externum of right lower eyelid   • Cardiomyopathy (HCC)   • Dementia with behavioral disturbance (HCC)   • Severe malnutrition (CMS/HCC)   • Type 2 diabetes mellitus without complication, without long-term current use of insulin (HCC)   • Constipation   • Hypernatremia     Past Medical History:   Diagnosis Date   • Bilateral leg edema    • 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   • CHF (congestive heart failure) (HCC)    • Hypertension    • Loss of consciousness (HCC)    • Skin cyst    • SOB (shortness of breath)    • Symptomatic bradycardia    • Syncope      Past Surgical History:   Procedure Laterality Date   • CARDIAC ELECTROPHYSIOLOGY PROCEDURE N/A 2016    Procedure: Pacemaker DC new  [employer];  Surgeon: Ananth Malcolm MD;  Location: [facility] INVASIVE LOCATION;  Service:    • PACEMAKER IMPLANTATION        General Information     Row Name 22 1610          Physical Therapy Time and Intention    Document Type therapy note (daily note)  -CF     Mode of Treatment physical therapy; individual therapy  -CF     Row Name 22 161          General Information    Patient Profile Reviewed yes  -CF     Row Name 22 161          Cognition    Orientation Status (Cognition)  oriented to; person  -CF     Row Name 02/14/22 1610          Safety Issues, Functional Mobility    Impairments Affecting Function (Mobility) balance; cognition; endurance/activity tolerance; strength  -CF           User Key  (r) = Recorded By, (t) = Taken By, (c) = Cosigned By    Initials Name Provider Type    CF Ayaka Fuchs, PT Physical Therapist               Mobility     Row Name 02/14/22 1611          Bed Mobility    Bed Mobility supine-sit; sit-supine  -CF     Supine-Sit Barnum (Bed Mobility) contact guard  -CF     Sit-Supine Barnum (Bed Mobility) standby assist  -CF     Assistive Device (Bed Mobility) head of bed elevated; bed rails  -CF     Row Name 02/14/22 1611          Sit-Stand Transfer    Sit-Stand Barnum (Transfers) minimum assist (75% patient effort)  -CF     Assistive Device (Sit-Stand Transfers) other (see comments)  hha  -CF     Row Name 02/14/22 1611          Gait/Stairs (Locomotion)    Barnum Level (Gait) contact guard; verbal cues  -CF     Assistive Device (Gait) other (see comments)  hha  -CF     Distance in Feet (Gait) 150' x2  -CF     Deviations/Abnormal Patterns (Gait) festinating/shuffling; stride length decreased; base of support, narrow  -CF     Bilateral Gait Deviations heel strike decreased  -CF     Comment (Gait/Stairs) Small steps, good pace  -CF           User Key  (r) = Recorded By, (t) = Taken By, (c) = Cosigned By    Initials Name Provider Type    CF Ayaka Fuchs, PT Physical Therapist               Obj/Interventions    No documentation.                Goals/Plan    No documentation.                Clinical Impression     Row Name 02/14/22 1612          Pain Scale: Numbers Pre/Post-Treatment    Pretreatment Pain Rating 0/10 - no pain  -CF     Posttreatment Pain Rating 0/10 - no pain  -CF     Row Name 02/14/22 1612          Plan of Care Review    Plan of Care Reviewed With patient  -CF     Outcome Summary Pt seen for PT session this afternoon. Pt  increased ambulation distance to 150' x2 with hha. Demo good pace, but small stride and narrow base of support. Remains confused. PT will continue to follow and progress/  -CF     Row Name 02/14/22 1612          Vital Signs    O2 Delivery Pre Treatment room air  -CF     O2 Delivery Intra Treatment room air  -CF     O2 Delivery Post Treatment room air  -CF     Row Name 02/14/22 1612          Positioning and Restraints    Pre-Treatment Position in bed  -CF     Post Treatment Position bed  -CF     In Bed notified nsg; call light within reach; exit alarm on; encouraged to call for assist; side lying right; fowlers  -CF           User Key  (r) = Recorded By, (t) = Taken By, (c) = Cosigned By    Initials Name Provider Type    CF Ayaka Fuchs, WES Physical Therapist               Outcome Measures     Row Name 02/14/22 1613 02/14/22 0820       How much help from another person do you currently need...    Turning from your back to your side while in flat bed without using bedrails? 4  -CF 4  -AH    Moving from lying on back to sitting on the side of a flat bed without bedrails? 3  -CF 4  -AH    Moving to and from a bed to a chair (including a wheelchair)? 3  -CF 2  -AH    Standing up from a chair using your arms (e.g., wheelchair, bedside chair)? 3  -CF 2  -AH    Climbing 3-5 steps with a railing? 3  -CF 2  -AH    To walk in hospital room? 3  -CF 2  -AH    AM-PAC 6 Clicks Score (PT) 19  -CF 16  -AH    Row Name 02/14/22 1613          Functional Assessment    Outcome Measure Options AM-PAC 6 Clicks Basic Mobility (PT)  -CF           User Key  (r) = Recorded By, (t) = Taken By, (c) = Cosigned By    Initials Name Provider Type    Ayaka Marinelli, WES Physical Therapist    Niharika Alcala RN Registered Nurse                             Physical Therapy Education                 Title: PT OT SLP Therapies (Done)     Topic: Physical Therapy (Done)     Point: Mobility training (Done)     Learning Progress Summary            Patient Acceptance, E, VU,NR by CF at 2/14/2022 1613    Acceptance, E, NR by AR at 2/11/2022 1610    Acceptance, E, VU,NR by KH at 2/9/2022 1638    Acceptance, E, NR by AR at 2/8/2022 1111                   Point: Home exercise program (Done)     Learning Progress Summary           Patient Acceptance, E, VU,NR by CF at 2/14/2022 1613    Acceptance, E, NR by AR at 2/11/2022 1610    Acceptance, E, VU,NR by KH at 2/9/2022 1638    Acceptance, E, NR by AR at 2/8/2022 1111                   Point: Body mechanics (Done)     Learning Progress Summary           Patient Acceptance, E, VU,NR by CF at 2/14/2022 1613    Acceptance, E, NR by AR at 2/11/2022 1610    Acceptance, E, VU,NR by KH at 2/9/2022 1638    Acceptance, E, NR by AR at 2/8/2022 1111                   Point: Precautions (Done)     Learning Progress Summary           Patient Acceptance, E, VU,NR by CF at 2/14/2022 1613    Acceptance, E, NR by AR at 2/11/2022 1610    Acceptance, E, VU,NR by KH at 2/9/2022 1638    Acceptance, E, NR by AR at 2/8/2022 1111                               User Key     Initials Effective Dates Name Provider Type Discipline     06/16/21 -  Miranda Robles, PT Physical Therapist PT    AR 06/16/21 -  Samantha Tovar, PT Physical Therapist PT     06/16/21 -  Ayaka Fuchs, PT Physical Therapist PT              PT Recommendation and Plan     Plan of Care Reviewed With: patient  Outcome Summary: Pt seen for PT session this afternoon. Pt increased ambulation distance to 150' x2 with hha. Demo good pace, but small stride and narrow base of support. Remains confused. PT will continue to follow and progress/     Time Calculation:    PT Charges     Row Name 02/14/22 1607             Time Calculation    Start Time 1553  -CF      Stop Time 1605  -CF      Time Calculation (min) 12 min  -CF      PT Received On 02/14/22  -CF      PT - Next Appointment 02/15/22  -CF            User Key  (r) = Recorded By, (t) = Taken By, (c) = Cosigned  By    Initials Name Provider Type    CF Ayaka Fuchs, PT Physical Therapist              Therapy Charges for Today     Code Description Service Date Service Provider Modifiers Qty    97213218986 HC PT THERAPEUTIC ACT EA 15 MIN 2/14/2022 Ayaka Fuchs, PT GP 1          PT G-Codes  Outcome Measure Options: AM-PAC 6 Clicks Basic Mobility (PT)  AM-PAC 6 Clicks Score (PT): 19    Ayaka Fuchs PT  2/14/2022

## 2022-02-14 NOTE — NURSING NOTE
"Patient alert and oriented to self only. Pleasant and cooperative, easily redirected until around 530 pm. Pt attempting to get up out of bed numerous times stating \"I am going home\". Call light within reach, bed in low position, bed alarm on, door open, lights dimmed and TV on. Patient became agitated with a nurse who assisted patient to get back in bed after attempting to get up without assistance. Patient stated \"whose mother are you?!\". Patient again attempted to get up and stated to this nurse \"oh don't start those games with me. If you are going home, take me with you\" and started to tear up, asking nurse, \"where are you going\"? Explained to patient nurse was going to nurses station to finish charting. Patient did  walked 120 feet around unit with nurse, but became tired after 60 feet. Plan at this time is skilled nursing facility.   "

## 2022-02-15 LAB
GLUCOSE BLDC GLUCOMTR-MCNC: 102 MG/DL (ref 70–130)
GLUCOSE BLDC GLUCOMTR-MCNC: 165 MG/DL (ref 70–130)
GLUCOSE BLDC GLUCOMTR-MCNC: 186 MG/DL (ref 70–130)
GLUCOSE BLDC GLUCOMTR-MCNC: 92 MG/DL (ref 70–130)
MAGNESIUM SERPL-MCNC: 2 MG/DL (ref 1.6–2.4)

## 2022-02-15 PROCEDURE — 83735 ASSAY OF MAGNESIUM: CPT | Performed by: HOSPITALIST

## 2022-02-15 PROCEDURE — 82962 GLUCOSE BLOOD TEST: CPT

## 2022-02-15 PROCEDURE — 63710000001 INSULIN LISPRO (HUMAN) PER 5 UNITS: Performed by: INTERNAL MEDICINE

## 2022-02-15 RX ORDER — HALOPERIDOL 5 MG/1
5 TABLET ORAL
Status: DISCONTINUED | OUTPATIENT
Start: 2022-02-15 | End: 2022-02-17 | Stop reason: HOSPADM

## 2022-02-15 RX ADMIN — METOPROLOL SUCCINATE 50 MG: 50 TABLET, EXTENDED RELEASE ORAL at 12:33

## 2022-02-15 RX ADMIN — INSULIN LISPRO 2 UNITS: 100 INJECTION, SOLUTION INTRAVENOUS; SUBCUTANEOUS at 12:35

## 2022-02-15 RX ADMIN — POLYETHYLENE GLYCOL 3350 17 G: 17 POWDER, FOR SOLUTION ORAL at 12:33

## 2022-02-15 RX ADMIN — INSULIN LISPRO 2 UNITS: 100 INJECTION, SOLUTION INTRAVENOUS; SUBCUTANEOUS at 17:53

## 2022-02-15 RX ADMIN — HALOPERIDOL 5 MG: 5 TABLET ORAL at 17:12

## 2022-02-15 RX ADMIN — LISINOPRIL 7.5 MG: 5 TABLET ORAL at 12:33

## 2022-02-15 RX ADMIN — HALOPERIDOL 5 MG: 5 TABLET ORAL at 20:31

## 2022-02-15 NOTE — NURSING NOTE
Safety interventions are door open, lights on, tv on, call light within reach, bed in low position, patient has been toileted, brief changed within the hour, bed alarm on, video monitor on with sign on door, rails up x 3, and items within reach, including meal tray.  Pt continues to attempt to get up out of bed without assistance, and was found scooting self out end of bed. Called MD to discuss. Reviewed haldol orders. Pt has scheduled and prn haldol for agitation. Pt given washclothes to fold for distraction. Discussed application of restraints with MD, but will refrain. Scheduled haldol given. Will continue to monitor for safety.

## 2022-02-15 NOTE — NURSING NOTE
Patient is refusing to wear SCD's. Patient wore SCD's for approximately 2 hours, before patient removed them. Patient talking nonsensical, and nurse then understood patient to say she did not want SCD's on her legs. MD notified of patient refusal. No new orders.

## 2022-02-15 NOTE — PROGRESS NOTES
"DAILY PROGRESS NOTE  [facility]    Patient Identification:  Name: Savannah Andres  Age: 86 y.o.  Sex: female  :  1935  MRN: [id number]         Primary Care Physician: Vasu Del Angel MD    Subjective:  Interval History:She is confused.  She is better today.  She is more cooperative and following direction.  No need for restraints.    Objective:    Scheduled Meds:haloperidol, 2 mg, Oral, Daily With Dinner  haloperidol, 5 mg, Oral, Nightly  insulin lispro, 0-7 Units, Subcutaneous, TID AC  lisinopril, 7.5 mg, Oral, Daily  metoprolol succinate XL, 50 mg, Oral, Daily  polyethylene glycol, 17 g, Oral, Daily  senna-docusate sodium, 2 tablet, Oral, Nightly      Continuous Infusions:     Vital signs in last 24 hours:  Temp:  [97.8 °F (36.6 °C)] 97.8 °F (36.6 °C)  Heart Rate:  [63-73] 73  Resp:  [16] 16  BP: (147-158)/(78-88) 158/88    Intake/Output:    Intake/Output Summary (Last 24 hours) at 2/15/2022 1154  Last data filed at 2/15/2022 0937  Gross per 24 hour   Intake 320 ml   Output --   Net 320 ml       Exam:  /88 (BP Location: Right arm, Patient Position: Lying)   Pulse 73   Temp 97.8 °F (36.6 °C) (Oral)   Resp 16   Ht 157.5 cm (62.01\")   Wt 47.1 kg (103 lb 13.4 oz) Comment: not weighed by RD  SpO2 98%   BMI 18.99 kg/m²     General Appearance:    confused, no distress   Head:    Normocephalic, without obvious abnormality, atraumatic   Eyes:       Throat:   Lips, tongue, gums normal   Neck:   Supple, symmetrical, trachea midline, no JVD   Lungs:     Clear to auscultation bilaterally, respirations unlabored   Chest Wall:    No tenderness or deformity    Heart:    Regular rate and rhythm, S1 and S2 normal, no murmur,no  Rub or gallop   Abdomen:     Soft, nontender, bowel sounds active, no masses, no organomegaly    Extremities:   Extremities normal, atraumatic, no cyanosis or edema   Pulses:      Skin:   Skin is warm and dry,  no rashes or palpable lesions   Neurologic:   no focal " deficits noted, confused.  Demented      Lab Results (last 72 hours)     Procedure Component Value Units Date/Time    POC Glucose Once [[id number]]  (Normal) Collected: 02/11/22 1118    Specimen: Blood Updated: 02/11/22 1130     Glucose 110 mg/dL      Comment: Meter: [id number] : [id number] Xiang MAIER       Magnesium [[id number]]  (Normal) Collected: 02/11/22 0621    Specimen: Blood Updated: 02/11/22 0917     Magnesium 2.1 mg/dL     Basic Metabolic Panel [[id number]]  (Abnormal) Collected: 02/11/22 0621    Specimen: Blood Updated: 02/11/22 0746     Glucose 117 mg/dL      BUN 19 mg/dL      Creatinine 0.74 mg/dL      Sodium 141 mmol/L      Potassium 3.7 mmol/L      Chloride 106 mmol/L      CO2 27.3 mmol/L      Calcium 8.8 mg/dL      eGFR Non African Amer 74 mL/min/1.73      BUN/Creatinine Ratio 25.7     Anion Gap 7.7 mmol/L     Narrative:      GFR Normal >60  Chronic Kidney Disease <60  Kidney Failure <15      CBC (No Diff) [[id number]]  (Abnormal) Collected: 02/11/22 0621    Specimen: Blood Updated: 02/11/22 0718     WBC 6.35 10*3/mm3      RBC 3.99 10*6/mm3      Hemoglobin 11.8 g/dL      Hematocrit 35.3 %      MCV 88.5 fL      MCH 29.6 pg      MCHC 33.4 g/dL      RDW 12.7 %      RDW-SD 41.0 fl      MPV 10.5 fL      Platelets 209 10*3/mm3     POC Glucose Once [[id number]]  (Normal) Collected: 02/11/22 0610    Specimen: Blood Updated: 02/11/22 0612     Glucose 112 mg/dL      Comment: Meter: [id number] : [id number] [place] Donna CNA       POC Glucose Once [[id number]]  (Abnormal) Collected: 02/10/22 2038    Specimen: Blood Updated: 02/10/22 2039     Glucose 173 mg/dL      Comment: Meter: [id number] : [id number] [place] Donna CNA       POC Glucose Once [[id number]]  (Normal) Collected: 02/10/22 1051    Specimen: Blood Updated: 02/10/22 1053     Glucose 117 mg/dL      Comment: Meter: [id number] : [id number] Shilpi MAIER       Basic Metabolic Panel [[id number]]  (Abnormal) Collected: 02/10/22 0456    Specimen:  Blood Updated: 02/10/22 0639     Glucose 178 mg/dL      BUN 19 mg/dL      Creatinine 0.81 mg/dL      Sodium 140 mmol/L      Potassium 4.1 mmol/L      Chloride 102 mmol/L      CO2 22.0 mmol/L      Calcium 9.4 mg/dL      eGFR Non African Amer 67 mL/min/1.73      BUN/Creatinine Ratio 23.5     Anion Gap 16.0 mmol/L     Narrative:      GFR Normal >60  Chronic Kidney Disease <60  Kidney Failure <15      Magnesium [[id number]]  (Normal) Collected: 02/10/22 0456    Specimen: Blood Updated: 02/10/22 0639     Magnesium 2.0 mg/dL     POC Glucose Once [[id number]]  (Abnormal) Collected: 02/10/22 0616    Specimen: Blood Updated: 02/10/22 0618     Glucose 160 mg/dL      Comment: Meter: [id number] : [id number] Jax MAIER       CBC (No Diff) [[id number]]  (Normal) Collected: 02/10/22 0456    Specimen: Blood Updated: 02/10/22 0601     WBC 9.99 10*3/mm3      RBC 4.28 10*6/mm3      Hemoglobin 12.7 g/dL      Hematocrit 38.5 %      MCV 90.0 fL      MCH 29.7 pg      MCHC 33.0 g/dL      RDW 12.6 %      RDW-SD 41.0 fl      MPV 10.6 fL      Platelets 254 10*3/mm3     POC Glucose Once [[id number]]  (Normal) Collected: 02/09/22 2046    Specimen: Blood Updated: 02/09/22 2047     Glucose 129 mg/dL      Comment: Meter: [id number] : [id number] Jax MAIER       POC Glucose Once [[id number]]  (Abnormal) Collected: 02/09/22 1643    Specimen: Blood Updated: 02/09/22 1644     Glucose 168 mg/dL      Comment: Meter: [id number] : [id number] Davian MAIER       POC Glucose Once [[id number]]  (Abnormal) Collected: 02/09/22 1118    Specimen: Blood Updated: 02/09/22 1120     Glucose 162 mg/dL      Comment: Meter: [id number] : [id number] Davian MAIER       Comprehensive Metabolic Panel [[id number]]  (Abnormal) Collected: 02/09/22 0452    Specimen: Blood Updated: 02/09/22 0635     Glucose 132 mg/dL      BUN 23 mg/dL      Creatinine 0.85 mg/dL      Sodium 142 mmol/L      Potassium 3.9 mmol/L      Chloride 105 mmol/L      CO2  28.1 mmol/L      Calcium 9.1 mg/dL      Total Protein 6.1 g/dL      Albumin 2.90 g/dL      ALT (SGPT) 17 U/L      AST (SGOT) 20 U/L      Alkaline Phosphatase 74 U/L      Total Bilirubin 0.4 mg/dL      eGFR Non African Amer 63 mL/min/1.73      Globulin 3.2 gm/dL      A/G Ratio 0.9 g/dL      BUN/Creatinine Ratio 27.1     Anion Gap 8.9 mmol/L     Narrative:      GFR Normal >60  Chronic Kidney Disease <60  Kidney Failure <15      Magnesium [[id number]]  (Normal) Collected: 02/09/22 0452    Specimen: Blood Updated: 02/09/22 0635     Magnesium 2.3 mg/dL     POC Glucose Once [[id number]]  (Abnormal) Collected: 02/09/22 0632    Specimen: Blood Updated: 02/09/22 0634     Glucose 144 mg/dL      Comment: Meter: [id number] : [id number] [employer]       CBC (No Diff) [[id number]]  (Abnormal) Collected: 02/09/22 0452    Specimen: Blood Updated: 02/09/22 0557     WBC 5.57 10*3/mm3      RBC 4.05 10*6/mm3      Hemoglobin 11.9 g/dL      Hematocrit 37.3 %      MCV 92.1 fL      MCH 29.4 pg      MCHC 31.9 g/dL      RDW 12.6 %      RDW-SD 43.3 fl      MPV 10.5 fL      Platelets 217 10*3/mm3     POC Glucose Once [[id number]]  (Normal) Collected: 02/08/22 2028    Specimen: Blood Updated: 02/08/22 2031     Glucose 99 mg/dL      Comment: Meter: [id number] : [id number] [employer] NA       POC Glucose Once [[id number]]  (Abnormal) Collected: 02/08/22 1551    Specimen: Blood Updated: 02/08/22 1552     Glucose 161 mg/dL      Comment: Meter: [id number] : [id number] Carlos A Nguyen CNA           Data Review:  Results from last 7 days   Lab Units 02/14/22  0341 02/13/22  0855 02/13/22  0855 02/11/22  0621 02/11/22  0621   SODIUM mmol/L 138  --  140  --  141   POTASSIUM mmol/L 3.9  --  4.1  --  3.7   CHLORIDE mmol/L 105  --  108*  --  106   CO2 mmol/L 27.0  --  26.0  --  27.3   BUN mg/dL 18  --  24*  --  19   CREATININE mg/dL 0.71  --  0.70  --  0.74   GLUCOSE mg/dL 133*   < > 132*   < > 117*   CALCIUM mg/dL 8.7  --  8.5*  --  8.8    <  > = values in this interval not displayed.     Results from last 7 days   Lab Units 02/14/22  0341 02/13/22  0855 02/11/22  0621   WBC 10*3/mm3 6.41 5.86 6.35   HEMOGLOBIN g/dL 11.0* 12.1 11.8*   HEMATOCRIT % 34.1 36.6 35.3   PLATELETS 10*3/mm3 197 206 209             Lab Results   Lab Value Date/Time    TROPONINT 0.101 (C) 11/22/2016 1745         Results from last 7 days   Lab Units 02/09/22  0452   ALK PHOS U/L 74   BILIRUBIN mg/dL 0.4   ALT (SGPT) U/L 17   AST (SGOT) U/L 20             Glucose   Date/Time Value Ref Range Status   02/15/2022 1135 186 (H) 70 - 130 mg/dL Final     Comment:     Meter: [id number] : [id number] Shilpi Ortiz NA   02/15/2022 0637 102 70 - 130 mg/dL Final     Comment:     Meter: [id number] : [id number] Vielka Thompson CNA   02/14/2022 2128 118 70 - 130 mg/dL Final     Comment:     Meter: [id number] : [id number] Vielka Thompson CNA   02/14/2022 1639 96 70 - 130 mg/dL Final     Comment:     Meter: [id number] : [id number] Shilpi Walsheb NA   02/14/2022 1257 160 (H) 70 - 130 mg/dL Final     Comment:     Meter: [id number] : [id number] Luis Miguele Angel NA   02/14/2022 1141 125 70 - 130 mg/dL Final     Comment:     Meter: [id number] : [id number] Luis Miguele Angel NA   02/14/2022 0632 143 (H) 70 - 130 mg/dL Final     Comment:     Meter: [id number] : [id number] Ricardo Blanc PCA   02/13/2022 2043 103 70 - 130 mg/dL Final     Comment:     Meter: [id number] : [id number] Ricardo Blanc PCA           Past Medical History:   Diagnosis Date   • Bilateral leg edema    • 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   • CHF (congestive heart failure) (HCC)    • Hypertension    • Loss of consciousness (HCC)    • Skin cyst    • SOB (shortness of breath)    • Symptomatic bradycardia    • Syncope        Assessment:  Active Hospital Problems    Diagnosis  POA   • **Dementia with behavioral disturbance (HCC) [F03.91]  Yes   • Constipation [K59.00]  Yes   • Hypernatremia [E87.0]  No   • Type 2 diabetes  mellitus without complication, without long-term current use of insulin (HCC) [E11.9]  Yes   • Severe malnutrition (CMS/HCC) [E43]  Yes   • Cardiomyopathy (HCC) [I42.9]  Yes   • Pulmonary hypertension (HCC) [I27.20]  Yes   • Hypertension [I10]  Yes   • Hypokalemia [E87.6]  Yes   • CHF (congestive heart failure), NYHA class III (HCC) [I50.9]  Yes      Resolved Hospital Problems   No resolved problems to display.       Plan:  Continue with current RX. Psych consult appreciated. Follow lab.  Restraints not needed.  Follow-up lab.  She is improved and probably could go to a skilled nursing facility for rehab.  Probably not functioning well enough to go back to her assisted living facility.  She is doing better over the past couple of days and I am not sure were ready for palliative care yet either.  Probably needs skilled nursing facility for a while until we see how she does.    Iker Harrison MD  2/15/2022  11:54 EST

## 2022-02-15 NOTE — PROGRESS NOTES
The patient is pleasantly confused during today's interview but staff reports that she continues to have issues with sundowning and aggressive behavior at night.  In hopes of addressing this I will increase her suppertime dose of haloperidol to 5 mg along with her nighttime dose of 5 mg.

## 2022-02-15 NOTE — PROGRESS NOTES
"Adult Nutrition  Assessment/PES    Patient Name:  Savannah Andres  YOB: 1935  MRN: [id number]  Admit Date:  2/3/2022    Assessment Date:  2/15/2022    Comments:  F/u severe malnutrition. Pt is on soft texture; ground; thin; cardiac; consistent carbohydrate diet. She has Boost glucose control ordered TID and Magic Cup BID at lunch and dinner. Intake is still poor: 0-50% intake documented. Pt has been enthusiastic that she loves anything chocolate. Communicated with nurse who said yesterday she ate all of her meal from breakfast and dinner, and this morning she drank 1/2 Boost, 3/4 milk, and ate her cereal. Continue to follow.     Reason for Assessment     Row Name 02/15/22 0917          Reason for Assessment    Reason For Assessment follow-up protocol     Diagnosis other (see comments)  admit w/: memory problem, severe malnutrition, T2DM, constipation, high sodium levels, hx: heart failure, low blood potassium, high blood pressure disorder, pulomary hypertension, heart muscle disorder     Identified At Risk by Screening Criteria MST SCORE 2+                Nutrition/Diet History     Row Name 02/15/22 0918          Nutrition/Diet History    Typical Food/Fluid Intake Pt not eating well. 2/13: 0%, 50% intake     Factors Affecting Nutritional Intake impaired cognitive status/motor control                Anthropometrics     Row Name 02/15/22 0922 02/15/22 0919       Anthropometrics    Height 157.5 cm (62.01\") 157.5 cm (62.01\")    Weight -- 47.1 kg (103 lb 13.4 oz)  not weighed by RD       Ideal Body Weight (IBW)    Ideal Body Weight (IBW) (kg) 50.45 50.45    % Ideal Body Weight -- 93.36       Body Mass Index (BMI)    BMI (kg/m2) -- 19.03    BMI Assessment -- BMI 18.5-24.9: normal    Row Name 02/15/22 0532          Anthropometrics    Weight 47.1 kg (103 lb 14.4 oz)                Labs/Tests/Procedures/Meds     Row Name 02/15/22 0921          Labs/Procedures/Meds    Lab Results Reviewed reviewed     Lab " "Results Comments albumin (low)            Diagnostic Tests/Procedures    Diagnostic Test/Procedure Reviewed reviewed            Medications    Pertinent Medications Reviewed reviewed     Pertinent Medications Comments haldol, insulin, lisinopril, metoprolol succinate, miralax, pericolace, prn: haldol, melatonin, potassium chloride                  Estimated/Assessed Needs     Row Name 02/15/22 0922 02/15/22 0919       Calculation Measurements    Weight Used For Calculations 47.1 kg (103 lb 13.4 oz) --    Height 157.5 cm (62.01\") 157.5 cm (62.01\")       Estimated/Assessed Needs    Additional Documentation Fluid Requirements (Group); Protein Requirements (Group); KCAL/KG (Group) --       KCAL/KG    KCAL/KG 35 Kcal/Kg (kcal); 40 Kcal/Kg (kcal) --    35 Kcal/Kg (kcal) 1648.5 --    40 Kcal/Kg (kcal) 1884 --       Protein Requirements    Weight Used For Protein Calculations 47.1 kg (103 lb 13.4 oz) --    Est Protein Requirement Amount (gms/kg) 1.5 gm protein --    Estimated Protein Requirements (gms/day) 70.65 --       Fluid Requirements    Fluid Requirements (mL/day) 1700 --    RDA Method (mL) 1700 --               Nutrition Prescription Ordered     Row Name 02/15/22 0923          Nutrition Prescription PO    Current PO Diet Soft Texture     Texture Ground     Fluid Consistency Thin     Common Modifiers Cardiac; Consistent Carbohydrate                Evaluation of Received Nutrient/Fluid Intake     Row Name 02/15/22 0923          PO Evaluation    % PO Intake 2/13: 0-50%                     Problem/Interventions:   Problem 1     Row Name 02/15/22 0923          Nutrition Diagnoses Problem 1    Problem 1 Malnutrition     Etiology (related to) Medical Diagnosis     Neurological Other (comment)  memory problem, dementia with behavioral distrubance     Signs/Symptoms (evidenced by) Unintended Weight Change; Other (comment)  muscle and fat wasting     Unintended Weight Change Loss     Number of Pounds Lost 15     Weight loss " time period 1 month                      Intervention Goal     Row Name 02/15/22 0923          Intervention Goal    General Maintain nutrition; Meet nutritional needs for age/condition     PO Tolerate PO; Increase intake; Meet estimated needs     Weight Appropriate weight gain                Nutrition Intervention     Row Name 02/15/22 0924          Nutrition Intervention    RD/Tech Action Care plan reviewd; Follow Tx progress;  Encourage intake     Recommended/Ordered                 Nutrition Prescription     Row Name 02/15/22 0924          Nutrition Prescription PO    PO Prescription      Supplement      Supplement Frequency      New PO Prescription Ordered?                 Education/Evaluation     Row Name 02/15/22 0924          Education    Education Will Instruct as appropriate            Monitor/Evaluation    Monitor Per protocol; I&O; PO intake; Supplement intake; Pertinent labs; Weight; Skin status; Symptoms                 Electronically signed by:  Marcia Steele RD  02/15/22 09:32 EST

## 2022-02-15 NOTE — PROGRESS NOTES
Continued Stay Note  [facility]     Patient Name: Savannah Andres  MRN: [id number]  Today's Date: 2/15/2022    Admit Date: 2/3/2022     Discharge Plan     Row Name 02/15/22 1121       Plan    Plan [facility] vs ltc bed    Plan Comments Anticipate dc soon after am huddle.  VM for dtrSavannah to follow up with plans for [facility] and transport needs.  VM for Darius to follow up with SNU referrals at [facility]......................Keerthi Godoy RN               Discharge Codes    No documentation.               Expected Discharge Date and Time     Expected Discharge Date Expected Discharge Time    Feb 15, 2022             Keerthi Godoy RN

## 2022-02-15 NOTE — PLAN OF CARE
Goal Outcome Evaluation:  Plan of Care Reviewed With: patient        Progress: no change  Outcome Summary: pt very aggressive and agitated at start of shift. became more pleasant and cooperative throughout night. only scheduled haldol given. no c/o pain or discomfort. q2 turns. up to bsc assist x1. small bm. wctm.

## 2022-02-16 LAB
ANION GAP SERPL CALCULATED.3IONS-SCNC: 7 MMOL/L (ref 5–15)
BASOPHILS # BLD AUTO: 0.05 10*3/MM3 (ref 0–0.2)
BASOPHILS NFR BLD AUTO: 0.7 % (ref 0–1.5)
BUN SERPL-MCNC: 21 MG/DL (ref 8–23)
BUN/CREAT SERPL: 36.2 (ref 7–25)
CALCIUM SPEC-SCNC: 8.9 MG/DL (ref 8.6–10.5)
CHLORIDE SERPL-SCNC: 105 MMOL/L (ref 98–107)
CO2 SERPL-SCNC: 28 MMOL/L (ref 22–29)
CREAT SERPL-MCNC: 0.58 MG/DL (ref 0.57–1)
DEPRECATED RDW RBC AUTO: 41.1 FL (ref 37–54)
EOSINOPHIL # BLD AUTO: 0.15 10*3/MM3 (ref 0–0.4)
EOSINOPHIL NFR BLD AUTO: 2.2 % (ref 0.3–6.2)
ERYTHROCYTE [DISTWIDTH] IN BLOOD BY AUTOMATED COUNT: 13 % (ref 12.3–15.4)
GFR SERPL CREATININE-BSD FRML MDRD: 99 ML/MIN/1.73
GLUCOSE BLDC GLUCOMTR-MCNC: 110 MG/DL (ref 70–130)
GLUCOSE BLDC GLUCOMTR-MCNC: 126 MG/DL (ref 70–130)
GLUCOSE BLDC GLUCOMTR-MCNC: 171 MG/DL (ref 70–130)
GLUCOSE BLDC GLUCOMTR-MCNC: 178 MG/DL (ref 70–130)
GLUCOSE SERPL-MCNC: 105 MG/DL (ref 65–99)
HCT VFR BLD AUTO: 34.9 % (ref 34–46.6)
HGB BLD-MCNC: 11.7 G/DL (ref 12–15.9)
IMM GRANULOCYTES # BLD AUTO: 0.03 10*3/MM3 (ref 0–0.05)
IMM GRANULOCYTES NFR BLD AUTO: 0.4 % (ref 0–0.5)
LYMPHOCYTES # BLD AUTO: 1.66 10*3/MM3 (ref 0.7–3.1)
LYMPHOCYTES NFR BLD AUTO: 24.4 % (ref 19.6–45.3)
MAGNESIUM SERPL-MCNC: 2.1 MG/DL (ref 1.6–2.4)
MCH RBC QN AUTO: 29.4 PG (ref 26.6–33)
MCHC RBC AUTO-ENTMCNC: 33.5 G/DL (ref 31.5–35.7)
MCV RBC AUTO: 87.7 FL (ref 79–97)
MONOCYTES # BLD AUTO: 0.58 10*3/MM3 (ref 0.1–0.9)
MONOCYTES NFR BLD AUTO: 8.5 % (ref 5–12)
NEUTROPHILS NFR BLD AUTO: 4.32 10*3/MM3 (ref 1.7–7)
NEUTROPHILS NFR BLD AUTO: 63.8 % (ref 42.7–76)
NRBC BLD AUTO-RTO: 0 /100 WBC (ref 0–0.2)
PLATELET # BLD AUTO: 222 10*3/MM3 (ref 140–450)
PMV BLD AUTO: 10.2 FL (ref 6–12)
POTASSIUM SERPL-SCNC: 3.9 MMOL/L (ref 3.5–5.2)
RBC # BLD AUTO: 3.98 10*6/MM3 (ref 3.77–5.28)
SODIUM SERPL-SCNC: 140 MMOL/L (ref 136–145)
WBC NRBC COR # BLD: 6.79 10*3/MM3 (ref 3.4–10.8)

## 2022-02-16 PROCEDURE — 97110 THERAPEUTIC EXERCISES: CPT

## 2022-02-16 PROCEDURE — 85025 COMPLETE CBC W/AUTO DIFF WBC: CPT | Performed by: HOSPITALIST

## 2022-02-16 PROCEDURE — 80048 BASIC METABOLIC PNL TOTAL CA: CPT | Performed by: HOSPITALIST

## 2022-02-16 PROCEDURE — 97116 GAIT TRAINING THERAPY: CPT

## 2022-02-16 PROCEDURE — 83735 ASSAY OF MAGNESIUM: CPT | Performed by: HOSPITALIST

## 2022-02-16 PROCEDURE — 63710000001 INSULIN LISPRO (HUMAN) PER 5 UNITS: Performed by: INTERNAL MEDICINE

## 2022-02-16 PROCEDURE — 82962 GLUCOSE BLOOD TEST: CPT

## 2022-02-16 RX ORDER — DIVALPROEX SODIUM 125 MG/1
125 TABLET, DELAYED RELEASE ORAL NIGHTLY
Status: DISCONTINUED | OUTPATIENT
Start: 2022-02-16 | End: 2022-02-17 | Stop reason: HOSPADM

## 2022-02-16 RX ADMIN — HALOPERIDOL 5 MG: 5 TABLET ORAL at 16:40

## 2022-02-16 RX ADMIN — METOPROLOL SUCCINATE 50 MG: 50 TABLET, EXTENDED RELEASE ORAL at 09:47

## 2022-02-16 RX ADMIN — INSULIN LISPRO 2 UNITS: 100 INJECTION, SOLUTION INTRAVENOUS; SUBCUTANEOUS at 13:11

## 2022-02-16 RX ADMIN — LISINOPRIL 7.5 MG: 5 TABLET ORAL at 09:47

## 2022-02-16 RX ADMIN — POLYETHYLENE GLYCOL 3350 17 G: 17 POWDER, FOR SOLUTION ORAL at 09:47

## 2022-02-16 RX ADMIN — DOCUSATE SODIUM 50MG AND SENNOSIDES 8.6MG 2 TABLET: 8.6; 5 TABLET, FILM COATED ORAL at 20:13

## 2022-02-16 RX ADMIN — DIVALPROEX SODIUM 125 MG: 125 TABLET, DELAYED RELEASE ORAL at 20:14

## 2022-02-16 RX ADMIN — HALOPERIDOL 5 MG: 5 TABLET ORAL at 20:14

## 2022-02-16 NOTE — PLAN OF CARE
Goal Outcome Evaluation:  Plan of Care Reviewed With: patient           Outcome Summary: Pt seen for PT treatment this morning. Pt continues to require hha for ambulation due to mild unsteadiness. Pt completed numerous seated and supine exercises for LE ROM and strengthening. Will continue to follow and progress as able.

## 2022-02-16 NOTE — CONSULTS
Re-evaluated patient for inpatient hospice services. As patient is still able to take PO medications and has not needed IV medications for symptom management for the last 72 hours, she is not currently appropriate for \[facility]\"" Inpatient care. \[facility]\"" staff will continue to follow.    Please call [phone] with any questions, concerns, or patient condition changes. Thank you for the referral.    Cindy Iniguez RN, BSN  [facility] Referral & Admission Coordinator

## 2022-02-16 NOTE — PROGRESS NOTES
The patient continues to have issues with sundowning even with initiation of supper and bedtime haloperidol.  We will try a nighttime dose of Depakote 250 mg in hopes of finally addressing the patient's problematic behaviors which mainly involve attempting to get out of bed.  She is in no restraint devices today.

## 2022-02-16 NOTE — THERAPY TREATMENT NOTE
Patient Name: Savannah Andres  : 1935    MRN: [id number]                              Today's Date: 2022       Admit Date: 2/3/2022    Visit Dx:     ICD-10-CM ICD-9-CM   1. Dementia with behavioral disturbance, unspecified dementia type (HCC)  F03.91 294.21   2. Diarrhea, unspecified type  R19.7 787.91   3. Hypokalemia  E87.6 276.8     Patient Active Problem List   Diagnosis   • Third degree heart block (HCC)   • CHF (congestive heart failure), NYHA class III (HCC)   • Hyperglycemia   • Hypokalemia   • Acute midline low back pain without sciatica   • Inflamed sebaceous cyst   • Hypertension   • Presence of permanent cardiac pacemaker   • Valvular heart disease   • Pulmonary hypertension (HCC)   • Hordeolum externum of right lower eyelid   • Cardiomyopathy (HCC)   • Dementia with behavioral disturbance (HCC)   • Severe malnutrition (CMS/HCC)   • Type 2 diabetes mellitus without complication, without long-term current use of insulin (HCC)   • Constipation   • Hypernatremia     Past Medical History:   Diagnosis Date   • Bilateral leg edema    • 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   • CHF (congestive heart failure) (HCC)    • Hypertension    • Loss of consciousness (HCC)    • Skin cyst    • SOB (shortness of breath)    • Symptomatic bradycardia    • Syncope      Past Surgical History:   Procedure Laterality Date   • CARDIAC ELECTROPHYSIOLOGY PROCEDURE N/A 2016    Procedure: Pacemaker DC new  [employer];  Surgeon: Ananth Malcolm MD;  Location: [facility] INVASIVE LOCATION;  Service:    • PACEMAKER IMPLANTATION        General Information     Row Name 22 1043          Physical Therapy Time and Intention    Document Type therapy note (daily note)  -CF     Mode of Treatment physical therapy; individual therapy  -CF     Row Name 22 1043          General Information    Patient Profile Reviewed yes  -CF     Row Name 22 1043          Cognition    Orientation Status (Cognition)  oriented to; person  -CF     Row Name 02/16/22 1043          Safety Issues, Functional Mobility    Safety Issues Affecting Function (Mobility) insight into deficits/self-awareness  -CF     Impairments Affecting Function (Mobility) balance; cognition; endurance/activity tolerance; strength  -CF           User Key  (r) = Recorded By, (t) = Taken By, (c) = Cosigned By    Initials Name Provider Type     Ayaka Fuchs, WES Physical Therapist               Mobility     Row Name 02/16/22 1043          Bed Mobility    Bed Mobility supine-sit; sit-supine  -CF     Sit-Supine [place] (Bed Mobility) standby assist  -CF     Assistive Device (Bed Mobility) head of bed elevated  -CF     Row Name 02/16/22 1043          Gait/Stairs (Locomotion)    [place] Level (Gait) contact guard; verbal cues  -CF     Assistive Device (Gait) other (see comments)  hha on L  -CF     Distance in Feet (Gait) 150' x2  -CF     Deviations/Abnormal Patterns (Gait) festinating/shuffling; stride length decreased; base of support, narrow  -CF     Bilateral Gait Deviations heel strike decreased  -CF           User Key  (r) = Recorded By, (t) = Taken By, (c) = Cosigned By    Initials Name Provider Type     Ayaka Fuchs, WES Physical Therapist               Obj/Interventions     Row Name 02/16/22 1044          Motor Skills    Therapeutic Exercise other (see comments)  seated B ankle pumps, laq, marches, bicep curls, shoulder raises x10 reps, supine heel slides, hip abd/add, slr x10 reps  -CF     Row Name 02/16/22 1044          Sensory Assessment (Somatosensory)    Sensory Assessment (Somatosensory) LE sensation intact  -CF           User Key  (r) = Recorded By, (t) = Taken By, (c) = Cosigned By    Initials Name Provider Type     Ayaka Fuchs, WES Physical Therapist               Goals/Plan    No documentation.                Clinical Impression     Row Name 02/16/22 1045          Pain Scale: Numbers Pre/Post-Treatment    Pretreatment  Pain Rating 0/10 - no pain  -CF     Posttreatment Pain Rating 0/10 - no pain  -CF     Row Name 02/16/22 1045          Plan of Care Review    Plan of Care Reviewed With patient  -CF     Outcome Summary Pt seen for PT treatment this morning. Pt continues to require hha for ambulation due to mild unsteadiness. Pt completed numerous seated and supine exercises for LE ROM and strengthening. Will continue to follow and progress as able.  -CF           User Key  (r) = Recorded By, (t) = Taken By, (c) = Cosigned By    Initials Name Provider Type    Ayaka Marinelli, WES Physical Therapist               Outcome Measures     Row Name 02/16/22 1046          How much help from another person do you currently need...    Turning from your back to your side while in flat bed without using bedrails? 4  -CF     Moving from lying on back to sitting on the side of a flat bed without bedrails? 3  -CF     Moving to and from a bed to a chair (including a wheelchair)? 3  -CF     Standing up from a chair using your arms (e.g., wheelchair, bedside chair)? 3  -CF     Climbing 3-5 steps with a railing? 3  -CF     To walk in hospital room? 3  -CF     AM-PAC 6 Clicks Score (PT) 19  -CF     Row Name 02/16/22 1046          Functional Assessment    Outcome Measure Options AM-PAC 6 Clicks Basic Mobility (PT)  -CF           User Key  (r) = Recorded By, (t) = Taken By, (c) = Cosigned By    Initials Name Provider Type    Ayaka Marinelli, WES Physical Therapist                             Physical Therapy Education                 Title: PT OT SLP Therapies (Done)     Topic: Physical Therapy (Done)     Point: Mobility training (Done)     Learning Progress Summary           Patient Acceptance, E, VU,NR by CF at 2/16/2022 1047    Acceptance, E, VU,NR by CF at 2/14/2022 1613    Acceptance, E, NR by AR at 2/11/2022 1610    Acceptance, E, VU,NR by KH at 2/9/2022 1638    Acceptance, E, NR by AR at 2/8/2022 1111                   Point: Home exercise  program (Done)     Learning Progress Summary           Patient Acceptance, E, VU,NR by CF at 2/16/2022 1047    Acceptance, E, VU,NR by CF at 2/14/2022 1613    Acceptance, E, NR by AR at 2/11/2022 1610    Acceptance, E, VU,NR by KH at 2/9/2022 1638    Acceptance, E, NR by AR at 2/8/2022 1111                   Point: Body mechanics (Done)     Learning Progress Summary           Patient Acceptance, E, VU,NR by CF at 2/16/2022 1047    Acceptance, E, VU,NR by CF at 2/14/2022 1613    Acceptance, E, NR by AR at 2/11/2022 1610    Acceptance, E, VU,NR by KH at 2/9/2022 1638    Acceptance, E, NR by AR at 2/8/2022 1111                   Point: Precautions (Done)     Learning Progress Summary           Patient Acceptance, E, VU,NR by CF at 2/16/2022 1047    Acceptance, E, VU,NR by CF at 2/14/2022 1613    Acceptance, E, NR by AR at 2/11/2022 1610    Acceptance, E, VU,NR by KH at 2/9/2022 1638    Acceptance, E, NR by AR at 2/8/2022 1111                               User Key     Initials Effective Dates Name Provider Type Discipline     06/16/21 -  Miranda Robles, PT Physical Therapist PT    AR 06/16/21 -  Samantha Tovar PT Physical Therapist PT     06/16/21 -  Ayaka Fuchs, WES Physical Therapist PT              PT Recommendation and Plan     Plan of Care Reviewed With: patient  Outcome Summary: Pt seen for PT treatment this morning. Pt continues to require hha for ambulation due to mild unsteadiness. Pt completed numerous seated and supine exercises for LE ROM and strengthening. Will continue to follow and progress as able.     Time Calculation:    PT Charges     Row Name 02/16/22 1047             Time Calculation    Start Time 1028  -CF      Stop Time 1051  -      Time Calculation (min) 23 min  -CF      PT Received On 02/16/22  -CF      PT - Next Appointment 02/18/22  -            User Key  (r) = Recorded By, (t) = Taken By, (c) = Cosigned By    Initials Name Provider Type    CF Ayaka Fuchs, PT  Physical Therapist              Therapy Charges for Today     Code Description Service Date Service Provider Modifiers Qty    [id number]  GAIT TRAINING EA 15 MIN 2/16/2022 Ayaka Fuchs, PT GP 1    [id number]  PT THER PROC EA 15 MIN 2/16/2022 Ayaka Fuchs, PT GP 1          PT G-Codes  Outcome Measure Options: AM-PAC 6 Clicks Basic Mobility (PT)  AM-PAC 6 Clicks Score (PT): 19    Ayaka Fuchs, PT  2/16/2022

## 2022-02-16 NOTE — PROGRESS NOTES
"DAILY PROGRESS NOTE  [facility]    Patient Identification:  Name: Savannah Andres  Age: 86 y.o.  Sex: female  :  1935  MRN: [id number]         Primary Care Physician: Vasu Del Angel MD    Subjective:  Interval History:She is confused.  She is better today.  She is more cooperative and following direction.  No need for restraints.    Objective:    Scheduled Meds:divalproex, 125 mg, Oral, Nightly  haloperidol, 5 mg, Oral, Nightly  haloperidol, 5 mg, Oral, Daily With Dinner  insulin lispro, 0-7 Units, Subcutaneous, TID AC  lisinopril, 7.5 mg, Oral, Daily  metoprolol succinate XL, 50 mg, Oral, Daily  polyethylene glycol, 17 g, Oral, Daily  senna-docusate sodium, 2 tablet, Oral, Nightly      Continuous Infusions:     Vital signs in last 24 hours:  Temp:  [97 °F (36.1 °C)-97.9 °F (36.6 °C)] 97 °F (36.1 °C)  Heart Rate:  [69-77] 77  Resp:  [18] 18  BP: (122-165)/(65-99) 142/71    Intake/Output:  No intake or output data in the 24 hours ending 22 1241    Exam:  /71 (BP Location: Right arm, Patient Position: Lying)   Pulse 77   Temp 97 °F (36.1 °C) (Oral)   Resp 18   Ht 157.5 cm (62.01\")   Wt 39.1 kg (86 lb 1.6 oz)   SpO2 94%   BMI 15.74 kg/m²     General Appearance:    confused, no distress   Head:    Normocephalic, without obvious abnormality, atraumatic   Eyes:       Throat:   Lips, tongue, gums normal   Neck:   Supple, symmetrical, trachea midline, no JVD   Lungs:     Clear to auscultation bilaterally, respirations unlabored   Chest Wall:    No tenderness or deformity    Heart:    Regular rate and rhythm, S1 and S2 normal, no murmur,no  Rub or gallop   Abdomen:     Soft, nontender, bowel sounds active, no masses, no organomegaly    Extremities:   Extremities normal, atraumatic, no cyanosis or edema   Pulses:      Skin:   Skin is warm and dry,  no rashes or palpable lesions   Neurologic:   no focal deficits noted, confused.  Demented      Lab Results (last 72 hours)     " Procedure Component Value Units Date/Time    POC Glucose Once [[id number]]  (Normal) Collected: 02/11/22 1118    Specimen: Blood Updated: 02/11/22 1130     Glucose 110 mg/dL      Comment: Meter: [id number] : [id number] Xiang Joel MAIER       Magnesium [[id number]]  (Normal) Collected: 02/11/22 0621    Specimen: Blood Updated: 02/11/22 0917     Magnesium 2.1 mg/dL     Basic Metabolic Panel [[id number]]  (Abnormal) Collected: 02/11/22 0621    Specimen: Blood Updated: 02/11/22 0746     Glucose 117 mg/dL      BUN 19 mg/dL      Creatinine 0.74 mg/dL      Sodium 141 mmol/L      Potassium 3.7 mmol/L      Chloride 106 mmol/L      CO2 27.3 mmol/L      Calcium 8.8 mg/dL      eGFR Non African Amer 74 mL/min/1.73      BUN/Creatinine Ratio 25.7     Anion Gap 7.7 mmol/L     Narrative:      GFR Normal >60  Chronic Kidney Disease <60  Kidney Failure <15      CBC (No Diff) [[id number]]  (Abnormal) Collected: 02/11/22 0621    Specimen: Blood Updated: 02/11/22 0718     WBC 6.35 10*3/mm3      RBC 3.99 10*6/mm3      Hemoglobin 11.8 g/dL      Hematocrit 35.3 %      MCV 88.5 fL      MCH 29.6 pg      MCHC 33.4 g/dL      RDW 12.7 %      RDW-SD 41.0 fl      MPV 10.5 fL      Platelets 209 10*3/mm3     POC Glucose Once [[id number]]  (Normal) Collected: 02/11/22 0610    Specimen: Blood Updated: 02/11/22 0612     Glucose 112 mg/dL      Comment: Meter: [id number] : [id number] Luke Donna CNA       POC Glucose Once [[id number]]  (Abnormal) Collected: 02/10/22 2038    Specimen: Blood Updated: 02/10/22 2039     Glucose 173 mg/dL      Comment: Meter: [id number] : [id number] Luke Donna CNA       POC Glucose Once [[id number]]  (Normal) Collected: 02/10/22 1051    Specimen: Blood Updated: 02/10/22 1053     Glucose 117 mg/dL      Comment: Meter: [id number] : [id number] Shilpi MAIER       Basic Metabolic Panel [[id number]]  (Abnormal) Collected: 02/10/22 0456    Specimen: Blood Updated: 02/10/22 0639     Glucose 178 mg/dL      BUN 19 mg/dL       Creatinine 0.81 mg/dL      Sodium 140 mmol/L      Potassium 4.1 mmol/L      Chloride 102 mmol/L      CO2 22.0 mmol/L      Calcium 9.4 mg/dL      eGFR Non African Amer 67 mL/min/1.73      BUN/Creatinine Ratio 23.5     Anion Gap 16.0 mmol/L     Narrative:      GFR Normal >60  Chronic Kidney Disease <60  Kidney Failure <15      Magnesium [[id number]]  (Normal) Collected: 02/10/22 0456    Specimen: Blood Updated: 02/10/22 0639     Magnesium 2.0 mg/dL     POC Glucose Once [[id number]]  (Abnormal) Collected: 02/10/22 0616    Specimen: Blood Updated: 02/10/22 0618     Glucose 160 mg/dL      Comment: Meter: [id number] : [id number] Jax MAIER       CBC (No Diff) [[id number]]  (Normal) Collected: 02/10/22 0456    Specimen: Blood Updated: 02/10/22 0601     WBC 9.99 10*3/mm3      RBC 4.28 10*6/mm3      Hemoglobin 12.7 g/dL      Hematocrit 38.5 %      MCV 90.0 fL      MCH 29.7 pg      MCHC 33.0 g/dL      RDW 12.6 %      RDW-SD 41.0 fl      MPV 10.6 fL      Platelets 254 10*3/mm3     POC Glucose Once [[id number]]  (Normal) Collected: 02/09/22 2046    Specimen: Blood Updated: 02/09/22 2047     Glucose 129 mg/dL      Comment: Meter: [id number] : [id number] Jax MAIER       POC Glucose Once [[id number]]  (Abnormal) Collected: 02/09/22 1643    Specimen: Blood Updated: 02/09/22 1644     Glucose 168 mg/dL      Comment: Meter: [id number] : [id number] Davian MAIER       POC Glucose Once [[id number]]  (Abnormal) Collected: 02/09/22 1118    Specimen: Blood Updated: 02/09/22 1120     Glucose 162 mg/dL      Comment: Meter: [id number] : [id number] Davian Alexia NA       Comprehensive Metabolic Panel [[id number]]  (Abnormal) Collected: 02/09/22 0452    Specimen: Blood Updated: 02/09/22 0635     Glucose 132 mg/dL      BUN 23 mg/dL      Creatinine 0.85 mg/dL      Sodium 142 mmol/L      Potassium 3.9 mmol/L      Chloride 105 mmol/L      CO2 28.1 mmol/L      Calcium 9.1 mg/dL      Total Protein 6.1 g/dL      Albumin  2.90 g/dL      ALT (SGPT) 17 U/L      AST (SGOT) 20 U/L      Alkaline Phosphatase 74 U/L      Total Bilirubin 0.4 mg/dL      eGFR Non African Amer 63 mL/min/1.73      Globulin 3.2 gm/dL      A/G Ratio 0.9 g/dL      BUN/Creatinine Ratio 27.1     Anion Gap 8.9 mmol/L     Narrative:      GFR Normal >60  Chronic Kidney Disease <60  Kidney Failure <15      Magnesium [[id number]]  (Normal) Collected: 02/09/22 0452    Specimen: Blood Updated: 02/09/22 0635     Magnesium 2.3 mg/dL     POC Glucose Once [[id number]]  (Abnormal) Collected: 02/09/22 0632    Specimen: Blood Updated: 02/09/22 0634     Glucose 144 mg/dL      Comment: Meter: [id number] : [id number] Vince MendiolaInnovacellarthur MAIER       CBC (No Diff) [[id number]]  (Abnormal) Collected: 02/09/22 0452    Specimen: Blood Updated: 02/09/22 0557     WBC 5.57 10*3/mm3      RBC 4.05 10*6/mm3      Hemoglobin 11.9 g/dL      Hematocrit 37.3 %      MCV 92.1 fL      MCH 29.4 pg      MCHC 31.9 g/dL      RDW 12.6 %      RDW-SD 43.3 fl      MPV 10.5 fL      Platelets 217 10*3/mm3     POC Glucose Once [[id number]]  (Normal) Collected: 02/08/22 2028    Specimen: Blood Updated: 02/08/22 2031     Glucose 99 mg/dL      Comment: Meter: [id number] : [id number] 0xdata LIVIER       POC Glucose Once [[id number]]  (Abnormal) Collected: 02/08/22 1551    Specimen: Blood Updated: 02/08/22 1552     Glucose 161 mg/dL      Comment: Meter: [id number] : [id number] Carlos A Nguyen CNA           Data Review:  Results from last 7 days   Lab Units 02/16/22  0414 02/14/22  0341 02/14/22  0341 02/13/22  0855 02/13/22  0855   SODIUM mmol/L 140  --  138  --  140   POTASSIUM mmol/L 3.9  --  3.9  --  4.1   CHLORIDE mmol/L 105  --  105  --  108*   CO2 mmol/L 28.0  --  27.0  --  26.0   BUN mg/dL 21  --  18  --  24*   CREATININE mg/dL 0.58  --  0.71  --  0.70   GLUCOSE mg/dL 105*   < > 133*   < > 132*   CALCIUM mg/dL 8.9  --  8.7  --  8.5*    < > = values in this interval not displayed.     Results from last 7 days    Lab Units 02/16/22  0414 02/14/22  0341 02/13/22  0855   WBC 10*3/mm3 6.79 6.41 5.86   HEMOGLOBIN g/dL 11.7* 11.0* 12.1   HEMATOCRIT % 34.9 34.1 36.6   PLATELETS 10*3/mm3 222 197 206             Lab Results   Lab Value Date/Time    TROPONINT 0.101 (C) 11/22/2016 1745               Invalid input(s): PROT, LABALBU          Glucose   Date/Time Value Ref Range Status   02/16/2022 1117 171 (H) 70 - 130 mg/dL Final     Comment:     Meter: [id number] : [id number] Khanhearle Saeed NA   02/16/2022 0627 110 70 - 130 mg/dL Final     Comment:     Meter: [id number] : [id number] Ricardo Blanc PCA   02/15/2022 2007 92 70 - 130 mg/dL Final     Comment:     Meter: [id number] : [id number] Silva Jayashree PCA   02/15/2022 1606 165 (H) 70 - 130 mg/dL Final     Comment:     Meter: [id number] : [id number] Aabye Angel NA   02/15/2022 1135 186 (H) 70 - 130 mg/dL Final     Comment:     Meter: [id number] : [id number] Aabye Angel NA   02/15/2022 0637 102 70 - 130 mg/dL Final     Comment:     Meter: [id number] : [id number] Portal Donna CNA   02/14/2022 2128 118 70 - 130 mg/dL Final     Comment:     Meter: [id number] : [id number] Vielka Noa CNA   02/14/2022 1639 96 70 - 130 mg/dL Final     Comment:     Meter: [id number] : [id number] Aabye Angel NA           Past Medical History:   Diagnosis Date   • Bilateral leg edema    • CHB (complete heart block) ([facility])     3rd degree   • CHF (congestive heart failure) ([facility])    • Hypertension    • Loss of consciousness (HCC)    • Skin cyst    • SOB (shortness of breath)    • Symptomatic bradycardia    • Syncope        Assessment:  Active Hospital Problems    Diagnosis  POA   • **Dementia with behavioral disturbance (HCC) [F03.91]  Yes   • Constipation [K59.00]  Yes   • Hypernatremia [E87.0]  No   • Type 2 diabetes mellitus without complication, without long-term current use of insulin (HCC) [E11.9]  Yes   • Severe malnutrition (CMS/HCC) [E43]  Yes   • Cardiomyopathy (HCC) [I42.9]   Yes   • Pulmonary hypertension (HCC) [I27.20]  Yes   • Hypertension [I10]  Yes   • Hypokalemia [E87.6]  Yes   • CHF (congestive heart failure), NYHA class III (HCC) [I50.9]  Yes      Resolved Hospital Problems   No resolved problems to display.       Plan:  Continue with current RX. Psych consult appreciated. Follow lab.  Restraints not needed.  Follow-up lab.  She is improved and probably could go to a skilled nursing facility for rehab.  Probably not functioning well enough to go back to her assisted living facility.  She is doing better over the past couple of days and I am not sure were ready for palliative care yet either.  Probably needs skilled nursing facility for a while until we see how she does.    Iker Harrison MD  2/16/2022  12:41 EST

## 2022-02-16 NOTE — PLAN OF CARE
Alert to self only. Continuously setting bed alarm off. Monitor in room. Unable to keep gown, pulse ox, ekg cords on. Tolerates fluids well. Very mistrustful of staff. BP slightly elevated. Brief in place. Incontinent of bowel x 1.     Problem: Adult Inpatient Plan of Care  Goal: Absence of Hospital-Acquired Illness or Injury  Intervention: Identify and Manage Fall Risk  Recent Flowsheet Documentation  Taken 2/16/2022 0146 by Codie Owens RN  Safety Promotion/Fall Prevention: safety round/check completed  Taken 2/15/2022 2339 by Codie Owens RN  Safety Promotion/Fall Prevention: safety round/check completed  Taken 2/15/2022 2151 by Codie Owens RN  Safety Promotion/Fall Prevention: safety round/check completed  Taken 2/15/2022 1923 by Codie Owens RN  Safety Promotion/Fall Prevention: safety round/check completed     Problem: Adult Inpatient Plan of Care  Goal: Absence of Hospital-Acquired Illness or Injury  Intervention: Prevent Skin Injury  Recent Flowsheet Documentation  Taken 2/16/2022 0146 by Codie Owens RN  Body Position: side-lying, left  Taken 2/15/2022 2339 by Codie Owens RN  Body Position: side-lying, right  Skin Protection:  • adhesive use limited  • incontinence pads utilized  Taken 2/15/2022 2151 by Codie Owens RN  Body Position: sitting up in bed  Taken 2/15/2022 1923 by Codie Owens RN  Body Position: side-lying, left  Skin Protection:  • adhesive use limited  • electrode sites changed  • incontinence pads utilized  • pulse oximeter probe site changed   Goal Outcome Evaluation:

## 2022-02-17 ENCOUNTER — READMISSION MANAGEMENT (OUTPATIENT)
Dept: CALL CENTER | Facility: HOSPITAL | Age: 87
End: 2022-02-17

## 2022-02-17 VITALS
DIASTOLIC BLOOD PRESSURE: 70 MMHG | SYSTOLIC BLOOD PRESSURE: 122 MMHG | HEIGHT: 62 IN | OXYGEN SATURATION: 95 % | BODY MASS INDEX: 15.86 KG/M2 | TEMPERATURE: 96.9 F | WEIGHT: 86.2 LBS | HEART RATE: 71 BPM | RESPIRATION RATE: 16 BRPM

## 2022-02-17 LAB
ANION GAP SERPL CALCULATED.3IONS-SCNC: 5.1 MMOL/L (ref 5–15)
BASOPHILS # BLD AUTO: 0.06 10*3/MM3 (ref 0–0.2)
BASOPHILS NFR BLD AUTO: 1 % (ref 0–1.5)
BUN SERPL-MCNC: 20 MG/DL (ref 8–23)
BUN/CREAT SERPL: 32.3 (ref 7–25)
CALCIUM SPEC-SCNC: 8.9 MG/DL (ref 8.6–10.5)
CHLORIDE SERPL-SCNC: 105 MMOL/L (ref 98–107)
CO2 SERPL-SCNC: 28.9 MMOL/L (ref 22–29)
CREAT SERPL-MCNC: 0.62 MG/DL (ref 0.57–1)
DEPRECATED RDW RBC AUTO: 41.3 FL (ref 37–54)
EOSINOPHIL # BLD AUTO: 0.21 10*3/MM3 (ref 0–0.4)
EOSINOPHIL NFR BLD AUTO: 3.3 % (ref 0.3–6.2)
ERYTHROCYTE [DISTWIDTH] IN BLOOD BY AUTOMATED COUNT: 12.9 % (ref 12.3–15.4)
GFR SERPL CREATININE-BSD FRML MDRD: 91 ML/MIN/1.73
GLUCOSE BLDC GLUCOMTR-MCNC: 133 MG/DL (ref 70–130)
GLUCOSE BLDC GLUCOMTR-MCNC: 152 MG/DL (ref 70–130)
GLUCOSE SERPL-MCNC: 132 MG/DL (ref 65–99)
HCT VFR BLD AUTO: 34.3 % (ref 34–46.6)
HGB BLD-MCNC: 11.6 G/DL (ref 12–15.9)
IMM GRANULOCYTES # BLD AUTO: 0.04 10*3/MM3 (ref 0–0.05)
IMM GRANULOCYTES NFR BLD AUTO: 0.6 % (ref 0–0.5)
LYMPHOCYTES # BLD AUTO: 1.52 10*3/MM3 (ref 0.7–3.1)
LYMPHOCYTES NFR BLD AUTO: 24.1 % (ref 19.6–45.3)
MAGNESIUM SERPL-MCNC: 2 MG/DL (ref 1.6–2.4)
MCH RBC QN AUTO: 30.3 PG (ref 26.6–33)
MCHC RBC AUTO-ENTMCNC: 33.8 G/DL (ref 31.5–35.7)
MCV RBC AUTO: 89.6 FL (ref 79–97)
MONOCYTES # BLD AUTO: 0.46 10*3/MM3 (ref 0.1–0.9)
MONOCYTES NFR BLD AUTO: 7.3 % (ref 5–12)
NEUTROPHILS NFR BLD AUTO: 4.01 10*3/MM3 (ref 1.7–7)
NEUTROPHILS NFR BLD AUTO: 63.7 % (ref 42.7–76)
NRBC BLD AUTO-RTO: 0 /100 WBC (ref 0–0.2)
PLATELET # BLD AUTO: 215 10*3/MM3 (ref 140–450)
PMV BLD AUTO: 10.2 FL (ref 6–12)
POTASSIUM SERPL-SCNC: 3.9 MMOL/L (ref 3.5–5.2)
RBC # BLD AUTO: 3.83 10*6/MM3 (ref 3.77–5.28)
SODIUM SERPL-SCNC: 139 MMOL/L (ref 136–145)
WBC NRBC COR # BLD: 6.3 10*3/MM3 (ref 3.4–10.8)

## 2022-02-17 PROCEDURE — 80048 BASIC METABOLIC PNL TOTAL CA: CPT | Performed by: HOSPITALIST

## 2022-02-17 PROCEDURE — 82962 GLUCOSE BLOOD TEST: CPT

## 2022-02-17 PROCEDURE — 63710000001 INSULIN LISPRO (HUMAN) PER 5 UNITS: Performed by: INTERNAL MEDICINE

## 2022-02-17 PROCEDURE — 83735 ASSAY OF MAGNESIUM: CPT | Performed by: HOSPITALIST

## 2022-02-17 PROCEDURE — 85025 COMPLETE CBC W/AUTO DIFF WBC: CPT | Performed by: HOSPITALIST

## 2022-02-17 RX ORDER — HALOPERIDOL 5 MG/1
5 TABLET ORAL
Qty: 30 TABLET | Refills: 0 | Status: SHIPPED | OUTPATIENT
Start: 2022-02-17 | End: 2022-03-16 | Stop reason: SDUPTHER

## 2022-02-17 RX ORDER — LANOLIN ALCOHOL/MO/W.PET/CERES
3 CREAM (GRAM) TOPICAL NIGHTLY PRN
Qty: 30 TABLET | Refills: 0 | Status: SHIPPED | OUTPATIENT
Start: 2022-02-17 | End: 2022-03-19

## 2022-02-17 RX ORDER — DIVALPROEX SODIUM 125 MG/1
125 TABLET, DELAYED RELEASE ORAL NIGHTLY
Qty: 30 TABLET | Refills: 0 | Status: SHIPPED | OUTPATIENT
Start: 2022-02-17 | End: 2022-03-16

## 2022-02-17 RX ORDER — HALOPERIDOL 5 MG/1
5 TABLET ORAL NIGHTLY
Qty: 30 TABLET | Refills: 0 | Status: SHIPPED | OUTPATIENT
Start: 2022-02-17 | End: 2022-03-16 | Stop reason: SDUPTHER

## 2022-02-17 RX ADMIN — INSULIN LISPRO 2 UNITS: 100 INJECTION, SOLUTION INTRAVENOUS; SUBCUTANEOUS at 12:14

## 2022-02-17 RX ADMIN — LISINOPRIL 7.5 MG: 5 TABLET ORAL at 09:20

## 2022-02-17 RX ADMIN — METOPROLOL SUCCINATE 50 MG: 50 TABLET, EXTENDED RELEASE ORAL at 09:20

## 2022-02-17 RX ADMIN — POLYETHYLENE GLYCOL 3350 17 G: 17 POWDER, FOR SOLUTION ORAL at 09:20

## 2022-02-17 NOTE — OUTREACH NOTE
Prep Survey      Responses   [facility] facility patient discharged from? [place]   Is LACE score < 7 ? No   Emergency Room discharge w/ pulse ox? No   Eligibility Not Eligible   What are the reasons patient is not eligible? [facility]   Does the patient have one of the following disease processes/diagnoses(primary or secondary)? CHF   Prep survey completed? Yes          Sonali Perez RN

## 2022-02-17 NOTE — PROGRESS NOTES
"Adult Nutrition  Assessment/PES    Patient Name:  Savannah Andres  YOB: 1935  MRN: [id number]  Admit Date:  2/3/2022    Assessment Date:  2/17/2022    Comments:  F/u severe malnutrition, MST 3, BMI 15.7. Pt is on soft texture; ground; thin; cardiac, consistent carbohydrate diet. She receives Boost glucose control TID and Magic Cup BID. Pt has varied intake 0-100%. Average intake x 5 meals is 65%. Spoke with nurse who said pt is drinking Boost glucose control. Pt has been confused. Pt is expected to be discharged today. Continue to follow.     Reason for Assessment     Row Name 02/17/22 1300          Reason for Assessment    Reason For Assessment follow-up protocol; other (see comments)  severe malnutrition, MST 3, BMI                Nutrition/Diet History     Row Name 02/17/22 1301          Nutrition/Diet History    Typical Food/Fluid Intake Pt intake varied: 0-100%. Average intake x 5 meals: 65%. Nurse said pt was drinking her Boost today.                Anthropometrics     Row Name 02/17/22 1301          Anthropometrics    Height 157.5 cm (62.01\")     Weight 39.1 kg (86 lb 3.2 oz)  not weighed by RD            Ideal Body Weight (IBW)    Ideal Body Weight (IBW) (kg) 50.45     % Ideal Body Weight 77.5     % of Ideal Body Weight Assessment 70-79%: moderate deficit            Body Mass Index (BMI)    BMI (kg/m2) 15.8     BMI Assessment BMI less than 16: protein-energy malnutrition grade III                Labs/Tests/Procedures/Meds     Row Name 02/17/22 1302          Labs/Procedures/Meds    Lab Results Reviewed reviewed     Lab Results Comments glucose (high)            Diagnostic Tests/Procedures    Diagnostic Test/Procedure Reviewed reviewed            Medications    Pertinent Medications Reviewed reviewed     Pertinent Medications Comments depakote, haldol, insulin, lisinopril, metoprolol succinate, miralax, pericolace, prn: haldol, melatonin, potassium chloride                  Estimated/Assessed " "Needs     Row Name 02/17/22 1301          Calculation Measurements    Height 157.5 cm (62.01\")                Nutrition Prescription Ordered     Row Name 02/17/22 1303          Nutrition Prescription PO    Current PO Diet Soft Texture     Texture Ground     Fluid Consistency Thin     Supplement Boost Glucose Control (Glucerna Shake); Magic Cup  Boost glucose control TID, magic cup BID     Common Modifiers Cardiac; Consistent Carbohydrate                Evaluation of Received Nutrient/Fluid Intake     Row Name 02/17/22 1304          PO Evaluation    % PO Intake 65% average x 5 meals                     Problem/Interventions:           Intervention Goal     Row Name 02/17/22 1304          Intervention Goal    General Meet nutritional needs for age/condition; Maintain nutrition     PO Tolerate PO; Meet estimated needs; Increase intake     Weight Appropriate weight gain                Nutrition Intervention     Row Name 02/17/22 1304          Nutrition Intervention    RD/Tech Action Care plan reviewd; Follow Tx progress; Encourage intake                  Education/Evaluation     Row Name 02/17/22 1304          Education    Education Will Instruct as appropriate            Monitor/Evaluation    Monitor Per protocol; I&O; PO intake; Supplement intake; Pertinent labs; Weight; Skin status; Symptoms                 Electronically signed by:  Marcia Steele RD  02/17/22 13:07 EST  "

## 2022-02-17 NOTE — PROGRESS NOTES
Continued Stay Note  [facility]     Patient Name: Savannah Andres  MRN: [id number]  Today's Date: 2/17/2022    Admit Date: 2/3/2022     Discharge Plan     Row Name 02/17/22 1200       Plan    Plan [facility] via [employer] at 3pm resevation number is [id number]    Plan Comments VM for dtr, Savannah Andres [phone].   VM for Nicole at [facility] [phone] to notify of dc orders.  Notified Collin and Britt Dmitry [phone].  They prefers that she return to Memory care at [facility]. Reji arranged for 3pm  and [employer] will be charged 80 dollar for transport, discussed with Britt the dtr in law and she declines to pay and patient is not able to afford the cost...........................Keerthi Godoy RN               Discharge Codes    No documentation.               Expected Discharge Date and Time     Expected Discharge Date Expected Discharge Time    Feb 17, 2022             Keerthi Godoy RN

## 2022-02-17 NOTE — DISCHARGE INSTR - DIET
Soft texture, cardiac, consistent carb, thin liquids.   Boost glucose controls, breakfast, lunch, and dinner.   Magic cup lunch and dinner.

## 2022-02-17 NOTE — NURSING NOTE
Called, spoke with Veronika at [facility], did not want report called wanted AVS faxed. AVS faxed to [phone] per request.

## 2022-02-17 NOTE — DISCHARGE SUMMARY
PHYSICIAN DISCHARGE SUMMARY                                                                        [facility]    Patient Identification:  Name: Savannah Andres  Age: 86 y.o.  Sex: female  :  1935  MRN: [id number]  Primary Care Physician: Vasu Del Angel MD    Admit date: 2/3/2022  Discharge date and time:2022  Discharged Condition: good    Discharge Diagnoses:  Active Hospital Problems    Diagnosis  POA   • **Dementia with behavioral disturbance (HCC) [F03.91]  Yes   • Constipation [K59.00]  Yes   • Hypernatremia [E87.0]  No   • Type 2 diabetes mellitus without complication, without long-term current use of insulin (HCC) [E11.9]  Yes   • Severe malnutrition (CMS/HCC) [E43]  Yes   • Cardiomyopathy (HCC) [I42.9]  Yes   • Pulmonary hypertension (HCC) [I27.20]  Yes   • Hypertension [I10]  Yes   • Hypokalemia [E87.6]  Yes   • CHF (congestive heart failure), NYHA class III (HCC) [I50.9]  Yes      Resolved Hospital Problems   No resolved problems to display.          PMHX:   Past Medical History:   Diagnosis Date   • Bilateral leg edema    • 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   • CHF (congestive heart failure) (HCC)    • Hypertension    • Loss of consciousness (HCC)    • Skin cyst    • SOB (shortness of breath)    • Symptomatic bradycardia    • Syncope      PSHX:   Past Surgical History:   Procedure Laterality Date   • CARDIAC ELECTROPHYSIOLOGY PROCEDURE N/A 2016    Procedure: Pacemaker DC new  [employer];  Surgeon: Ananth Malcolm MD;  Location: [facility] INVASIVE LOCATION;  Service:    • PACEMAKER IMPLANTATION         Hospital Course: Savannah Andres  Is a 86 year old female who presented to the emergency room with confusion worse than her baseline; she has a history of dementia and [facility]; she has not been as interactive as usual and has also had diarrhea; she is not able to give any history; a  daughter was present earlier          The patient was admitted to the hospital with worsening confusion with dementia and behavioral problems.  Initially was requiring restraints.  Psychiatry saw the patient and made some adjustments on the medicines and she seemed to be able to function a little bit better.  After being in the hospital for a couple of weeks she was doing well enough to go back to the assisted living facility and continue there.  She will follow-up with her primary care in 1 week for ongoing care.      Consults:     Consults     Date and Time Order Name Status Description    2/5/2022  3:33 PM Inpatient Psychiatrist Consult Completed     2/3/2022  2:05 PM [facility] (on-call MD unless specified) Details          Results from last 7 days   Lab Units 02/17/22  0347   WBC 10*3/mm3 6.30   HEMOGLOBIN g/dL 11.6*   HEMATOCRIT % 34.3   PLATELETS 10*3/mm3 215     Results from last 7 days   Lab Units 02/17/22  0347   SODIUM mmol/L 139   POTASSIUM mmol/L 3.9   CHLORIDE mmol/L 105   CO2 mmol/L 28.9   BUN mg/dL 20   CREATININE mg/dL 0.62   GLUCOSE mg/dL 132*   CALCIUM mg/dL 8.9     Significant Diagnostic Studies:   WBC   Date Value Ref Range Status   02/17/2022 6.30 3.40 - 10.80 10*3/mm3 Final     Hemoglobin   Date Value Ref Range Status   02/17/2022 11.6 (L) 12.0 - 15.9 g/dL Final     Hematocrit   Date Value Ref Range Status   02/17/2022 34.3 34.0 - 46.6 % Final     Platelets   Date Value Ref Range Status   02/17/2022 215 140 - 450 10*3/mm3 Final     Sodium   Date Value Ref Range Status   02/17/2022 139 136 - 145 mmol/L Final     Potassium   Date Value Ref Range Status   02/17/2022 3.9 3.5 - 5.2 mmol/L Final     Chloride   Date Value Ref Range Status   02/17/2022 105 98 - 107 mmol/L Final     CO2   Date Value Ref Range Status   02/17/2022 28.9 22.0 - 29.0 mmol/L Final     BUN   Date Value Ref Range Status   02/17/2022 20 8 - 23 mg/dL Final     Creatinine   Date Value Ref Range Status   02/17/2022 0.62 0.57 - 1.00 mg/dL  Final     Glucose   Date Value Ref Range Status   02/17/2022 132 (H) 65 - 99 mg/dL Final     Calcium   Date Value Ref Range Status   02/17/2022 8.9 8.6 - 10.5 mg/dL Final     Magnesium   Date Value Ref Range Status   02/17/2022 2.0 1.6 - 2.4 mg/dL Final     No results found for: AST, ALT, ALKPHOS  No results found for: APTT, INR  No results found for: COLORU, CLARITYU, SPECGRAV, PHUR, PROTEINUR, GLUCOSEU, KETONESU, BLOODU, NITRITE, LEUKOCYTESUR, BILIRUBINUR, UROBILINOGEN, RBCUA, WBCUA, BACTERIA, UACOMMENT  No results found for: TROPONINT, TROPONINI, BNP  No components found for: HGBA1C;2  No components found for: TSH;2  Imaging Results (All)     Procedure Component Value Units Date/Time    CT Head Without Contrast [[id number]] Collected: 02/03/22 1443     Updated: 02/04/22 0806    Narrative:      CT HEAD WITHOUT CONTRAST     HISTORY: Altered mental status.     COMPARISON: None.     FINDINGS: The brain ventricles are symmetrical. Confluent areas of  decreased attenuation are noted involving the white matter of the  cerebral hemispheres bilaterally. Appearance is consistent with  extensive small vessel ischemic disease. There is no evidence of acute  infarction or of intracranial hemorrhage. Further evaluation could be  performed with an MRI examination of the brain as indicated.        Radiation dose reduction techniques were utilized, including automated  exposure control and exposure modulation based on body size.     This report was finalized on 2/4/2022 8:03 AM by Dr. Luis Carlos Steward M.D.       XR Chest 1 View [[id number]] Collected: 02/03/22 1238     Updated: 02/03/22 1242    Narrative:      PORTABLE CHEST 02/03/2022 AT 12:22 PM     CLINICAL HISTORY: Weakness     Compared to the previous chest x-ray dated 01/19/2022.     The lungs are well-expanded and appear free of focal infiltrates. There  are no pleural effusions. The heart is normal in size. A dual-chamber  pacemaker is in place in satisfactory position in the  left subclavian  vein.     IMPRESSIONS: No evidence of acute disease within the chest.     This report was finalized on 2/3/2022 12:39 PM by Dr. Cristino Man M.D.           Lab Results (last 7 days)     Procedure Component Value Units Date/Time    POC Glucose Once [[id number]]  (Abnormal) Collected: 02/17/22 1132    Specimen: Blood Updated: 02/17/22 1137     Glucose 152 mg/dL      Comment: Meter: [id number] : [id number] Hawthorne Gill LIVIER       POC Glucose Once [[id number]]  (Abnormal) Collected: 02/17/22 0607    Specimen: Blood Updated: 02/17/22 0609     Glucose 133 mg/dL      Comment: Meter: [id number] : [id number] Nidia Lee RN       Basic Metabolic Panel [[id number]]  (Abnormal) Collected: 02/17/22 0347    Specimen: Blood Updated: 02/17/22 0417     Glucose 132 mg/dL      BUN 20 mg/dL      Creatinine 0.62 mg/dL      Sodium 139 mmol/L      Potassium 3.9 mmol/L      Chloride 105 mmol/L      CO2 28.9 mmol/L      Calcium 8.9 mg/dL      eGFR Non African Amer 91 mL/min/1.73      BUN/Creatinine Ratio 32.3     Anion Gap 5.1 mmol/L     Narrative:      GFR Normal >60  Chronic Kidney Disease <60  Kidney Failure <15      Magnesium [[id number]]  (Normal) Collected: 02/17/22 0347    Specimen: Blood Updated: 02/17/22 0417     Magnesium 2.0 mg/dL     CBC & Differential [[id number]]  (Abnormal) Collected: 02/17/22 0347    Specimen: Blood Updated: 02/17/22 0401    Narrative:      The following orders were created for panel order CBC & Differential.  Procedure                               Abnormality         Status                     ---------                               -----------         ------                     CBC Auto Differential[[id number]]        Abnormal            Final result                 Please view results for these tests on the individual orders.    CBC Auto Differential [[id number]]  (Abnormal) Collected: 02/17/22 0347    Specimen: Blood Updated: 02/17/22 0401     WBC 6.30 10*3/mm3      RBC 3.83  10*6/mm3      Hemoglobin 11.6 g/dL      Hematocrit 34.3 %      MCV 89.6 fL      MCH 30.3 pg      MCHC 33.8 g/dL      RDW 12.9 %      RDW-SD 41.3 fl      MPV 10.2 fL      Platelets 215 10*3/mm3      Neutrophil % 63.7 %      Lymphocyte % 24.1 %      Monocyte % 7.3 %      Eosinophil % 3.3 %      Basophil % 1.0 %      Immature Grans % 0.6 %      Neutrophils, Absolute 4.01 10*3/mm3      Lymphocytes, Absolute 1.52 10*3/mm3      Monocytes, Absolute 0.46 10*3/mm3      Eosinophils, Absolute 0.21 10*3/mm3      Basophils, Absolute 0.06 10*3/mm3      Immature Grans, Absolute 0.04 10*3/mm3      nRBC 0.0 /100 WBC     POC Glucose Once [[id number]]  (Abnormal) Collected: 02/16/22 2043    Specimen: Blood Updated: 02/16/22 2045     Glucose 178 mg/dL      Comment: Meter: [id number] : [id number] Allan MAIER       POC Glucose Once [[id number]]  (Normal) Collected: 02/16/22 1654    Specimen: Blood Updated: 02/16/22 1714     Glucose 126 mg/dL      Comment: Meter: [id number] : [id number] Ayad Hale RN       POC Glucose Once [[id number]]  (Abnormal) Collected: 02/16/22 1117    Specimen: Blood Updated: 02/16/22 1120     Glucose 171 mg/dL      Comment: Meter: [id number] : [id number] Bryan MAIER       POC Glucose Once [[id number]]  (Normal) Collected: 02/16/22 0627    Specimen: Blood Updated: 02/16/22 0628     Glucose 110 mg/dL      Comment: Meter: [id number] : [id number] Ricardo BAXTER       Basic Metabolic Panel [[id number]]  (Abnormal) Collected: 02/16/22 0414    Specimen: Blood Updated: 02/16/22 0511     Glucose 105 mg/dL      BUN 21 mg/dL      Creatinine 0.58 mg/dL      Sodium 140 mmol/L      Potassium 3.9 mmol/L      Chloride 105 mmol/L      CO2 28.0 mmol/L      Calcium 8.9 mg/dL      eGFR Non African Amer 99 mL/min/1.73      BUN/Creatinine Ratio 36.2     Anion Gap 7.0 mmol/L     Narrative:      GFR Normal >60  Chronic Kidney Disease <60  Kidney Failure <15      Magnesium [[id number]]  (Normal) Collected:  02/16/22 0414    Specimen: Blood Updated: 02/16/22 0511     Magnesium 2.1 mg/dL     CBC & Differential [[id number]]  (Abnormal) Collected: 02/16/22 0414    Specimen: Blood Updated: 02/16/22 0442    Narrative:      The following orders were created for panel order CBC & Differential.  Procedure                               Abnormality         Status                     ---------                               -----------         ------                     CBC Auto Differential[[id number]]        Abnormal            Final result                 Please view results for these tests on the individual orders.    CBC Auto Differential [[id number]]  (Abnormal) Collected: 02/16/22 0414    Specimen: Blood Updated: 02/16/22 0442     WBC 6.79 10*3/mm3      RBC 3.98 10*6/mm3      Hemoglobin 11.7 g/dL      Hematocrit 34.9 %      MCV 87.7 fL      MCH 29.4 pg      MCHC 33.5 g/dL      RDW 13.0 %      RDW-SD 41.1 fl      MPV 10.2 fL      Platelets 222 10*3/mm3      Neutrophil % 63.8 %      Lymphocyte % 24.4 %      Monocyte % 8.5 %      Eosinophil % 2.2 %      Basophil % 0.7 %      Immature Grans % 0.4 %      Neutrophils, Absolute 4.32 10*3/mm3      Lymphocytes, Absolute 1.66 10*3/mm3      Monocytes, Absolute 0.58 10*3/mm3      Eosinophils, Absolute 0.15 10*3/mm3      Basophils, Absolute 0.05 10*3/mm3      Immature Grans, Absolute 0.03 10*3/mm3      nRBC 0.0 /100 WBC     POC Glucose Once [[id number]]  (Normal) Collected: 02/15/22 2007    Specimen: Blood Updated: 02/15/22 2008     Glucose 92 mg/dL      Comment: Meter: [id number] : [id number] Ricardo BAXTER       POC Glucose Once [[id number]]  (Abnormal) Collected: 02/15/22 1606    Specimen: Blood Updated: 02/15/22 1608     Glucose 165 mg/dL      Comment: Meter: [id number] : [id number] Shilpi MAIER       POC Glucose Once [[id number]]  (Abnormal) Collected: 02/15/22 1135    Specimen: Blood Updated: 02/15/22 1137     Glucose 186 mg/dL      Comment: Meter: [id number] : [id number]  Aabye Angel NA       POC Glucose Once [[id number]]  (Normal) Collected: 02/15/22 0637    Specimen: Blood Updated: 02/15/22 0649     Glucose 102 mg/dL      Comment: Meter: [id number] : [id number] Vielka Donna CNA       Magnesium [[id number]]  (Normal) Collected: 02/15/22 0425    Specimen: Blood Updated: 02/15/22 0528     Magnesium 2.0 mg/dL     POC Glucose Once [[id number]]  (Normal) Collected: 02/14/22 2128    Specimen: Blood Updated: 02/14/22 2130     Glucose 118 mg/dL      Comment: Meter: [id number] : [id number] Vielka Noa CNA       POC Glucose Once [[id number]]  (Normal) Collected: 02/14/22 1639    Specimen: Blood Updated: 02/14/22 1640     Glucose 96 mg/dL      Comment: Meter: [id number] : [id number] Aabye Angel NA       POC Glucose Once [[id number]]  (Abnormal) Collected: 02/14/22 1257    Specimen: Blood Updated: 02/14/22 1259     Glucose 160 mg/dL      Comment: Meter: [id number] : [id number] Aabye Angel NA       POC Glucose Once [[id number]]  (Normal) Collected: 02/14/22 1141    Specimen: Blood Updated: 02/14/22 1144     Glucose 125 mg/dL      Comment: Meter: [id number] : [id number] Aabye Angel NA       POC Glucose Once [[id number]]  (Abnormal) Collected: 02/14/22 0632    Specimen: Blood Updated: 02/14/22 0633     Glucose 143 mg/dL      Comment: Meter: [id number] : [id number] Ricardo Blanc [facility]       Basic Metabolic Panel [[id number]]  (Abnormal) Collected: 02/14/22 0341    Specimen: Blood Updated: 02/14/22 0456     Glucose 133 mg/dL      BUN 18 mg/dL      Creatinine 0.71 mg/dL      Sodium 138 mmol/L      Potassium 3.9 mmol/L      Chloride 105 mmol/L      CO2 27.0 mmol/L      Calcium 8.7 mg/dL      eGFR Non African Amer 78 mL/min/1.73      BUN/Creatinine Ratio 25.4     Anion Gap 6.0 mmol/L     Narrative:      GFR Normal >60  Chronic Kidney Disease <60  Kidney Failure <15      Magnesium [[id number]]  (Normal) Collected: 02/14/22 0341    Specimen: Blood Updated: 02/14/22 0456     Magnesium 2.0 mg/dL      CBC & Differential [[id number]]  (Abnormal) Collected: 02/14/22 0341    Specimen: Blood Updated: 02/14/22 0424    Narrative:      The following orders were created for panel order CBC & Differential.  Procedure                               Abnormality         Status                     ---------                               -----------         ------                     CBC Auto Differential[[id number]]        Abnormal            Final result                 Please view results for these tests on the individual orders.    CBC Auto Differential [[id number]]  (Abnormal) Collected: 02/14/22 0341    Specimen: Blood Updated: 02/14/22 0424     WBC 6.41 10*3/mm3      RBC 3.80 10*6/mm3      Hemoglobin 11.0 g/dL      Hematocrit 34.1 %      MCV 89.7 fL      MCH 28.9 pg      MCHC 32.3 g/dL      RDW 12.4 %      RDW-SD 40.7 fl      MPV 10.0 fL      Platelets 197 10*3/mm3      Neutrophil % 62.8 %      Lymphocyte % 25.7 %      Monocyte % 8.3 %      Eosinophil % 2.2 %      Basophil % 0.8 %      Immature Grans % 0.2 %      Neutrophils, Absolute 4.03 10*3/mm3      Lymphocytes, Absolute 1.65 10*3/mm3      Monocytes, Absolute 0.53 10*3/mm3      Eosinophils, Absolute 0.14 10*3/mm3      Basophils, Absolute 0.05 10*3/mm3      Immature Grans, Absolute 0.01 10*3/mm3      nRBC 0.0 /100 WBC     POC Glucose Once [[id number]]  (Normal) Collected: 02/13/22 2043    Specimen: Blood Updated: 02/13/22 2044     Glucose 103 mg/dL      Comment: Meter: [id number] : [id number] Ricardo BAXTER       POC Glucose Once [[id number]]  (Abnormal) Collected: 02/13/22 1605    Specimen: Blood Updated: 02/13/22 1607     Glucose 192 mg/dL      Comment: Meter: [id number] : [id number] Vielka HERNANDESA       POC Glucose Once [[id number]]  (Abnormal) Collected: 02/13/22 1123    Specimen: Blood Updated: 02/13/22 1124     Glucose 168 mg/dL      Comment: Meter: [id number] : [id number] Tomas Manjarrez RN       Basic Metabolic Panel [[id number]]  (Abnormal) Collected:  02/13/22 0855    Specimen: Blood Updated: 02/13/22 0952     Glucose 132 mg/dL      BUN 24 mg/dL      Creatinine 0.70 mg/dL      Sodium 140 mmol/L      Potassium 4.1 mmol/L      Chloride 108 mmol/L      CO2 26.0 mmol/L      Calcium 8.5 mg/dL      eGFR Non African Amer 79 mL/min/1.73      BUN/Creatinine Ratio 34.3     Anion Gap 6.0 mmol/L     Narrative:      GFR Normal >60  Chronic Kidney Disease <60  Kidney Failure <15      Magnesium [[id number]]  (Normal) Collected: 02/13/22 0855    Specimen: Blood Updated: 02/13/22 0952     Magnesium 2.0 mg/dL     CBC & Differential [[id number]]  (Normal) Collected: 02/13/22 0855    Specimen: Blood Updated: 02/13/22 0935    Narrative:      The following orders were created for panel order CBC & Differential.  Procedure                               Abnormality         Status                     ---------                               -----------         ------                     CBC Auto Differential[[id number]]        Normal              Final result                 Please view results for these tests on the individual orders.    CBC Auto Differential [[id number]]  (Normal) Collected: 02/13/22 0855    Specimen: Blood Updated: 02/13/22 0935     WBC 5.86 10*3/mm3      RBC 4.04 10*6/mm3      Hemoglobin 12.1 g/dL      Hematocrit 36.6 %      MCV 90.6 fL      MCH 30.0 pg      MCHC 33.1 g/dL      RDW 12.6 %      RDW-SD 41.2 fl      MPV 10.2 fL      Platelets 206 10*3/mm3      Neutrophil % 64.3 %      Lymphocyte % 25.8 %      Monocyte % 7.0 %      Eosinophil % 1.9 %      Basophil % 0.7 %      Immature Grans % 0.3 %      Neutrophils, Absolute 3.77 10*3/mm3      Lymphocytes, Absolute 1.51 10*3/mm3      Monocytes, Absolute 0.41 10*3/mm3      Eosinophils, Absolute 0.11 10*3/mm3      Basophils, Absolute 0.04 10*3/mm3      Immature Grans, Absolute 0.02 10*3/mm3      nRBC 0.0 /100 WBC     POC Glucose Once [[id number]]  (Normal) Collected: 02/13/22 0642    Specimen: Blood Updated: 02/13/22 0642      Glucose 119 mg/dL      Comment: Meter: [id number] : [id number] Nidia Rosaapple ZHANG       POC Glucose Once [[id number]]  (Abnormal) Collected: 02/12/22 1632    Specimen: Blood Updated: 02/12/22 1633     Glucose 131 mg/dL      Comment: Meter: [id number] : [id number] Isauro Lunain NA       POC Glucose Once [[id number]]  (Abnormal) Collected: 02/12/22 1103    Specimen: Blood Updated: 02/12/22 1121     Glucose 146 mg/dL      Comment: Meter: [id number] : [id number] Workman Joel NA       POC Glucose Once [[id number]]  (Normal) Collected: 02/12/22 0601    Specimen: Blood Updated: 02/12/22 0602     Glucose 120 mg/dL      Comment: Meter: [id number] : [id number] Vielka Thompson CNA       Magnesium [[id number]]  (Normal) Collected: 02/12/22 0256    Specimen: Blood Updated: 02/12/22 0358     Magnesium 2.1 mg/dL     POC Glucose Once [[id number]]  (Abnormal) Collected: 02/11/22 2030    Specimen: Blood Updated: 02/11/22 2031     Glucose 191 mg/dL      Comment: Meter: [id number] : [id number] Vielka Noa CNA       POC Glucose Once [[id number]]  (Abnormal) Collected: 02/11/22 1600    Specimen: Blood Updated: 02/11/22 1610     Glucose 141 mg/dL      Comment: Meter: [id number] : [id number] Workman Joel NA       POC Glucose Once [[id number]]  (Normal) Collected: 02/11/22 1118    Specimen: Blood Updated: 02/11/22 1130     Glucose 110 mg/dL      Comment: Meter: [id number] : [id number] Workman Joel NA       Magnesium [[id number]]  (Normal) Collected: 02/11/22 0621    Specimen: Blood Updated: 02/11/22 0917     Magnesium 2.1 mg/dL     Basic Metabolic Panel [[id number]]  (Abnormal) Collected: 02/11/22 0621    Specimen: Blood Updated: 02/11/22 0746     Glucose 117 mg/dL      BUN 19 mg/dL      Creatinine 0.74 mg/dL      Sodium 141 mmol/L      Potassium 3.7 mmol/L      Chloride 106 mmol/L      CO2 27.3 mmol/L      Calcium 8.8 mg/dL      eGFR Non African Amer 74 mL/min/1.73      BUN/Creatinine Ratio 25.7     Anion Gap 7.7 mmol/L      "Narrative:      GFR Normal >60  Chronic Kidney Disease <60  Kidney Failure <15      CBC (No Diff) [[id number]]  (Abnormal) Collected: 02/11/22 0621    Specimen: Blood Updated: 02/11/22 0718     WBC 6.35 10*3/mm3      RBC 3.99 10*6/mm3      Hemoglobin 11.8 g/dL      Hematocrit 35.3 %      MCV 88.5 fL      MCH 29.6 pg      MCHC 33.4 g/dL      RDW 12.7 %      RDW-SD 41.0 fl      MPV 10.5 fL      Platelets 209 10*3/mm3     POC Glucose Once [[id number]]  (Normal) Collected: 02/11/22 0610    Specimen: Blood Updated: 02/11/22 0612     Glucose 112 mg/dL      Comment: Meter: [id number] : [id number] Vielka Noa CNA       POC Glucose Once [[id number]]  (Abnormal) Collected: 02/10/22 2038    Specimen: Blood Updated: 02/10/22 2039     Glucose 173 mg/dL      Comment: Meter: [id number] : [id number] Vielka Thompson CNA           /52 (BP Location: Left arm, Patient Position: Lying)   Pulse 62   Temp 96.6 °F (35.9 °C) (Oral)   Resp 16   Ht 157.5 cm (62.01\")   Wt 39.1 kg (86 lb 1.6 oz)   SpO2 96%   BMI 15.74 kg/m²     Discharge Exam:  General Appearance:    Alert, cooperative, no distress                          Head:    Normocephalic, without obvious abnormality, atraumatic                          Eyes:                            Throat:   Lips, tongue, gums normal                          Neck:   Supple, symmetrical, trachea midline, no JVD                        Lungs:     Clear to auscultation bilaterally, respirations unlabored                Chest Wall:    No tenderness or deformity                        Heart:    Regular rate and rhythm, S1 and S2 normal, no murmur,no  Rub  or gallop                  Abdomen:     Soft, non-tender, bowel sounds active, no masses, no organomegaly                  Extremities:   Extremities normal, atraumatic, no cyanosis or edema                             Skin:   Skin is warm and dry,  no rashes or palpable lesions                  Neurologic:   no focal deficits noted   "   Disposition:  Home    Patient Instructions:      Discharge Medications      New Medications      Instructions Start Date   divalproex 125 MG DR tablet  Commonly known as: DEPAKOTE   125 mg, Oral, Nightly      haloperidol 5 MG tablet  Commonly known as: HALDOL   5 mg, Oral, Nightly      haloperidol 5 MG tablet  Commonly known as: HALDOL   5 mg, Oral, Daily With Dinner      melatonin 3 MG tablet   3 mg, Oral, Nightly PRN         Changes to Medications      Instructions Start Date   lisinopril 5 MG tablet  Commonly known as: PRINIVIL,ZESTRIL  What changed:   · how much to take  · how to take this  · when to take this  · additional instructions   TAKE 1 AND 1/2 TABLET BY MOUTH DAILY         Continue These Medications      Instructions Start Date   metoprolol succinate XL 50 MG 24 hr tablet  Commonly known as: TOPROL-XL   50 mg, Oral, Daily         Stop These Medications    furosemide 20 MG tablet  Commonly known as: LASIX     potassium chloride 10 MEQ CR tablet  Commonly known as: K-DUR,KLOR-CON          No future appointments.   Follow-up Information     Vasu Del Angel MD Follow up in 1 week(s).    Specialty: Family Medicine  Contact information:  [address] [id number] [phone]                       Discharge Order (From admission, onward)     Start     Ordered    02/17/22 1135  Discharge patient  Once        Expected Discharge Date: 02/17/22    Discharge Disposition: Home or Self Care    Physician of Record for Attribution - Please select from Treatment Team: JAGDISH DALTON [[id number]]    Review needed by CMO to determine Physician of Record: No       Question Answer Comment   Physician of Record for Attribution - Please select from Treatment Team JAGDISH DALTON    Review needed by CMO to determine Physician of Record No        02/17/22 1138                Total time spent discharging patient including evaluation,post hospitalization follow up,  medication and post hospitalization  instructions and education total time exceeds 30 minutes.    Signed:  Iker Harrison MD  2/17/2022  11:38 EST

## 2022-02-18 NOTE — PROGRESS NOTES
Continued Stay Note  [facility]     Patient Name: Savannah Andres  MRN: [id number]  Today's Date: 2/18/2022    Admit Date: 2/3/2022     Discharge Plan     Row Name 02/18/22 1411       Plan    Final Discharge Disposition Code 01 - home or self-care    Final Note [facility] to [facility]               Discharge Codes    No documentation.               Expected Discharge Date and Time     Expected Discharge Date Expected Discharge Time    Feb 17, 2022             Keerthi Godoy RN

## 2022-03-16 RX ORDER — HALOPERIDOL 5 MG/1
TABLET ORAL
Qty: 30 TABLET | Refills: 5 | Status: SHIPPED | OUTPATIENT
Start: 2022-03-16

## 2022-03-16 RX ORDER — DIVALPROEX SODIUM 125 MG/1
TABLET, DELAYED RELEASE ORAL
Qty: 30 TABLET | Refills: 5 | Status: SHIPPED | OUTPATIENT
Start: 2022-03-16

## 2022-08-17 ENCOUNTER — TELEPHONE (OUTPATIENT)
Dept: CARDIOLOGY | Facility: CLINIC | Age: 87
End: 2022-08-17

## 2022-08-17 NOTE — TELEPHONE ENCOUNTER
Caller: BERT    Relationship: PROVIDER    Best call back number: [phone]    What is the best time to reach you: ANY    Who are you requesting to speak with (clinical staff, provider,  specific staff member): CLINICAL        What was the call regarding: THE NURSING HOME WOULD LIKE TO CONFIRM IF PTS PACEMAKER IS NORMAL OR A DEFIBRILLATOR. PT IS CLOSE TO PASSING AWAY. INFORMATION IS NEEDED FOR POSSIBLE REMOVAL.     Do you require a callback: YES

## 2023-02-01 NOTE — TELEPHONE ENCOUNTER
How Many Skin Cancers Have You Had?: more than one
Last OV 3/19/21.  Next OV 9/21/21.  Labs 2/6/20.   Does not meet protocol.  Please advise.    [facility] [place] KERVIN  
What Is The Reason For Today's Visit?: Follow Up Non-Melanoma Skin Cancer
When Was Your Last Cancer Diagnosed?: 2022